# Patient Record
Sex: MALE | Race: WHITE | Employment: FULL TIME | URBAN - METROPOLITAN AREA
[De-identification: names, ages, dates, MRNs, and addresses within clinical notes are randomized per-mention and may not be internally consistent; named-entity substitution may affect disease eponyms.]

---

## 2017-04-11 ENCOUNTER — HOSPITAL ENCOUNTER (EMERGENCY)
Age: 22
Discharge: HOME | End: 2017-04-11

## 2017-04-11 VITALS
HEART RATE: 68 BPM | HEIGHT: 70 IN | DIASTOLIC BLOOD PRESSURE: 77 MMHG | TEMPERATURE: 98.1 F | BODY MASS INDEX: 25.77 KG/M2 | SYSTOLIC BLOOD PRESSURE: 129 MMHG | OXYGEN SATURATION: 97 % | WEIGHT: 180 LBS | RESPIRATION RATE: 18 BRPM

## 2017-04-11 DIAGNOSIS — S05.01XA CORNEA ABRASION, RIGHT, INITIAL ENCOUNTER: Primary | ICD-10-CM

## 2017-04-11 PROCEDURE — 99283 EMERGENCY DEPT VISIT LOW MDM: CPT

## 2017-04-11 PROCEDURE — 6370000000 HC RX 637 (ALT 250 FOR IP): Performed by: PHYSICIAN ASSISTANT

## 2017-04-11 RX ORDER — ERYTHROMYCIN 5 MG/G
OINTMENT OPHTHALMIC EVERY 6 HOURS
Qty: 1 TUBE | Refills: 0 | Status: SHIPPED | OUTPATIENT
Start: 2017-04-11

## 2017-04-11 RX ORDER — HYDROCODONE BITARTRATE AND ACETAMINOPHEN 5; 325 MG/1; MG/1
1 TABLET ORAL EVERY 6 HOURS PRN
Qty: 10 TABLET | Refills: 0 | Status: SHIPPED | OUTPATIENT
Start: 2017-04-11 | End: 2017-04-18

## 2017-04-11 RX ORDER — TETRACAINE HYDROCHLORIDE 5 MG/ML
1 SOLUTION OPHTHALMIC ONCE
Status: COMPLETED | OUTPATIENT
Start: 2017-04-11 | End: 2017-04-11

## 2017-04-11 RX ADMIN — TETRACAINE HYDROCHLORIDE 1 DROP: 25 LIQUID OPHTHALMIC at 15:05

## 2017-04-11 RX ADMIN — FLUORESCEIN SODIUM 1 MG: 1 STRIP OPHTHALMIC at 15:04

## 2017-04-11 ASSESSMENT — ENCOUNTER SYMPTOMS
PHOTOPHOBIA: 1
EYE REDNESS: 1
DIARRHEA: 0
SHORTNESS OF BREATH: 0
EYE PAIN: 1
VOMITING: 0
COUGH: 0
EYE DISCHARGE: 1
NAUSEA: 0
ABDOMINAL PAIN: 0

## 2017-04-11 ASSESSMENT — PAIN DESCRIPTION - ORIENTATION: ORIENTATION: RIGHT

## 2017-04-11 ASSESSMENT — PAIN SCALES - GENERAL: PAINLEVEL_OUTOF10: 7

## 2017-04-11 ASSESSMENT — PAIN DESCRIPTION - LOCATION: LOCATION: EYE

## 2018-03-13 ENCOUNTER — HOSPITAL ENCOUNTER (EMERGENCY)
Age: 23
Discharge: HOME | End: 2018-03-13
Payer: COMMERCIAL

## 2018-03-13 VITALS
SYSTOLIC BLOOD PRESSURE: 145 MMHG | TEMPERATURE: 98.3 F | HEIGHT: 69 IN | WEIGHT: 195 LBS | RESPIRATION RATE: 14 BRPM | HEART RATE: 88 BPM | OXYGEN SATURATION: 96 % | DIASTOLIC BLOOD PRESSURE: 80 MMHG | BODY MASS INDEX: 28.88 KG/M2

## 2018-03-13 DIAGNOSIS — S41.112A LACERATION OF LEFT UPPER EXTREMITY, INITIAL ENCOUNTER: Primary | ICD-10-CM

## 2018-03-13 PROCEDURE — 99282 EMERGENCY DEPT VISIT SF MDM: CPT

## 2018-03-13 PROCEDURE — 6370000000 HC RX 637 (ALT 250 FOR IP): Performed by: NURSE PRACTITIONER

## 2018-03-13 PROCEDURE — 90715 TDAP VACCINE 7 YRS/> IM: CPT | Performed by: NURSE PRACTITIONER

## 2018-03-13 PROCEDURE — 6360000002 HC RX W HCPCS: Performed by: NURSE PRACTITIONER

## 2018-03-13 PROCEDURE — 90471 IMMUNIZATION ADMIN: CPT | Performed by: NURSE PRACTITIONER

## 2018-03-13 RX ORDER — SULFAMETHOXAZOLE AND TRIMETHOPRIM 800; 160 MG/1; MG/1
1 TABLET ORAL 2 TIMES DAILY
Qty: 14 TABLET | Refills: 0 | Status: SHIPPED | OUTPATIENT
Start: 2018-03-13 | End: 2018-03-20

## 2018-03-13 RX ORDER — ALBUTEROL SULFATE 0.63 MG/3ML
1 SOLUTION RESPIRATORY (INHALATION) EVERY 6 HOURS PRN
COMMUNITY

## 2018-03-13 RX ORDER — WOUND DRESSING ADHESIVE - LIQUID
1 LIQUID MISCELLANEOUS ONCE
Status: DISCONTINUED | OUTPATIENT
Start: 2018-03-13 | End: 2018-03-13

## 2018-03-13 RX ORDER — ACETAMINOPHEN 325 MG/1
650 TABLET ORAL ONCE
Status: COMPLETED | OUTPATIENT
Start: 2018-03-13 | End: 2018-03-13

## 2018-03-13 RX ADMIN — ACETAMINOPHEN 650 MG: 325 TABLET, FILM COATED ORAL at 18:37

## 2018-03-13 RX ADMIN — TETANUS TOXOID, REDUCED DIPHTHERIA TOXOID AND ACELLULAR PERTUSSIS VACCINE, ADSORBED 0.5 ML: 5; 2.5; 8; 8; 2.5 SUSPENSION INTRAMUSCULAR at 18:25

## 2018-03-13 ASSESSMENT — PAIN SCALES - GENERAL
PAINLEVEL_OUTOF10: 7
PAINLEVEL_OUTOF10: 7

## 2018-03-13 ASSESSMENT — ENCOUNTER SYMPTOMS
SHORTNESS OF BREATH: 0
COUGH: 0
ABDOMINAL PAIN: 0
BACK PAIN: 0

## 2018-03-13 ASSESSMENT — PAIN DESCRIPTION - PAIN TYPE: TYPE: ACUTE PAIN

## 2018-03-13 ASSESSMENT — PAIN DESCRIPTION - DESCRIPTORS: DESCRIPTORS: ACHING

## 2018-03-13 ASSESSMENT — PAIN DESCRIPTION - LOCATION: LOCATION: ARM

## 2018-03-13 ASSESSMENT — PAIN DESCRIPTION - ORIENTATION: ORIENTATION: LEFT

## 2018-03-13 NOTE — ED PROVIDER NOTES
Consent obtained:  Verbal    Consent given by:  Patient    Risks discussed:  Infection    Alternatives discussed:  No treatment  Anesthesia (see MAR for exact dosages): Anesthesia method:  None  Laceration details:     Location:  Shoulder/arm    Shoulder/arm location:  L upper arm    Length (cm):  1    Depth (mm):  2  Repair type:     Repair type:  Simple  Exploration:     Contaminated: yes    Treatment:     Area cleansed with:  Hibiclens    Amount of cleaning:  Standard    Irrigation solution:  Sterile saline    Irrigation volume:  200    Irrigation method:  Pressure wash    Visualized foreign bodies/material removed: no    Skin repair:     Repair method:  Steri-Strips    Number of Steri-Strips:  3  Approximation:     Approximation:  Close  Post-procedure details:     Dressing:  Adhesive bandage    Patient tolerance of procedure: Tolerated well, no immediate complications          FINAL IMPRESSION      1.  Laceration of left upper extremity, initial encounter          ELY Araujo (electronically signed)  Attending Emergency Physician        Tayler Guadarrama CNP  03/13/18 3078

## 2018-04-21 ENCOUNTER — HOSPITAL ENCOUNTER (EMERGENCY)
Age: 23
Discharge: HOME | End: 2018-04-22
Attending: EMERGENCY MEDICINE

## 2018-04-21 DIAGNOSIS — S00.83XA CONTUSION OF FACE, INITIAL ENCOUNTER: Primary | ICD-10-CM

## 2018-04-21 DIAGNOSIS — J45.990 ASTHMA, EXERCISE INDUCED: ICD-10-CM

## 2018-04-21 DIAGNOSIS — S20.212A CONTUSION OF LEFT CHEST WALL, INITIAL ENCOUNTER: ICD-10-CM

## 2018-04-21 PROCEDURE — 6370000000 HC RX 637 (ALT 250 FOR IP): Performed by: EMERGENCY MEDICINE

## 2018-04-21 PROCEDURE — 94640 AIRWAY INHALATION TREATMENT: CPT

## 2018-04-21 PROCEDURE — 6360000002 HC RX W HCPCS: Performed by: EMERGENCY MEDICINE

## 2018-04-21 PROCEDURE — 99284 EMERGENCY DEPT VISIT MOD MDM: CPT

## 2018-04-21 RX ORDER — ALBUTEROL SULFATE 90 UG/1
2 AEROSOL, METERED RESPIRATORY (INHALATION) EVERY 6 HOURS PRN
Qty: 1 INHALER | Refills: 3 | Status: SHIPPED | OUTPATIENT
Start: 2018-04-21

## 2018-04-21 RX ORDER — ACETAMINOPHEN 500 MG
1000 TABLET ORAL ONCE
Status: COMPLETED | OUTPATIENT
Start: 2018-04-21 | End: 2018-04-21

## 2018-04-21 RX ORDER — METHYLPREDNISOLONE 4 MG/1
TABLET ORAL
Qty: 1 KIT | Refills: 0 | Status: SHIPPED | OUTPATIENT
Start: 2018-04-21 | End: 2019-04-23

## 2018-04-21 RX ORDER — LEVALBUTEROL 1.25 MG/.5ML
1.25 SOLUTION, CONCENTRATE RESPIRATORY (INHALATION)
Status: DISCONTINUED | OUTPATIENT
Start: 2018-04-21 | End: 2018-04-22 | Stop reason: HOSPADM

## 2018-04-21 RX ADMIN — LEVALBUTEROL 1.25 MG: 1.25 SOLUTION, CONCENTRATE RESPIRATORY (INHALATION) at 23:47

## 2018-04-21 RX ADMIN — ACETAMINOPHEN 1000 MG: 500 TABLET, FILM COATED ORAL at 23:45

## 2018-04-21 ASSESSMENT — PAIN DESCRIPTION - ORIENTATION: ORIENTATION: MID

## 2018-04-21 ASSESSMENT — PAIN DESCRIPTION - FREQUENCY: FREQUENCY: CONTINUOUS

## 2018-04-21 ASSESSMENT — PAIN DESCRIPTION - LOCATION: LOCATION: HEAD

## 2018-04-21 ASSESSMENT — PAIN DESCRIPTION - DESCRIPTORS: DESCRIPTORS: HEADACHE

## 2018-04-21 ASSESSMENT — PAIN DESCRIPTION - PAIN TYPE: TYPE: ACUTE PAIN

## 2018-04-21 ASSESSMENT — PAIN SCALES - GENERAL: PAINLEVEL_OUTOF10: 6

## 2018-04-22 VITALS
SYSTOLIC BLOOD PRESSURE: 144 MMHG | OXYGEN SATURATION: 94 % | RESPIRATION RATE: 18 BRPM | HEIGHT: 67 IN | BODY MASS INDEX: 29.82 KG/M2 | DIASTOLIC BLOOD PRESSURE: 84 MMHG | TEMPERATURE: 98.5 F | HEART RATE: 111 BPM | WEIGHT: 190 LBS

## 2019-04-23 ENCOUNTER — HOSPITAL ENCOUNTER (EMERGENCY)
Age: 24
Discharge: HOME | End: 2019-04-23

## 2019-04-23 VITALS
RESPIRATION RATE: 17 BRPM | SYSTOLIC BLOOD PRESSURE: 136 MMHG | HEART RATE: 89 BPM | WEIGHT: 220 LBS | DIASTOLIC BLOOD PRESSURE: 67 MMHG | BODY MASS INDEX: 34.53 KG/M2 | OXYGEN SATURATION: 97 % | HEIGHT: 67 IN | TEMPERATURE: 99.2 F

## 2019-04-23 DIAGNOSIS — E86.0 DEHYDRATION: ICD-10-CM

## 2019-04-23 DIAGNOSIS — J02.0 STREP PHARYNGITIS: Primary | ICD-10-CM

## 2019-04-23 DIAGNOSIS — R53.1 GENERALIZED WEAKNESS: ICD-10-CM

## 2019-04-23 LAB
ALBUMIN SERPL-MCNC: 4.4 G/DL (ref 3.5–4.6)
ALP BLD-CCNC: 62 U/L (ref 35–104)
ALT SERPL-CCNC: 31 U/L (ref 0–41)
ANION GAP SERPL CALCULATED.3IONS-SCNC: 16 MEQ/L (ref 9–15)
AST SERPL-CCNC: 33 U/L (ref 0–40)
BASOPHILS ABSOLUTE: 0.1 K/UL (ref 0–0.2)
BASOPHILS RELATIVE PERCENT: 0.6 %
BILIRUB SERPL-MCNC: 0.5 MG/DL (ref 0.2–0.7)
BILIRUBIN URINE: NEGATIVE
BLOOD, URINE: NEGATIVE
BUN BLDV-MCNC: 15 MG/DL (ref 6–20)
CALCIUM SERPL-MCNC: 9.6 MG/DL (ref 8.5–9.9)
CHLORIDE BLD-SCNC: 99 MEQ/L (ref 95–107)
CLARITY: CLEAR
CO2: 24 MEQ/L (ref 20–31)
COLOR: YELLOW
CREAT SERPL-MCNC: 0.91 MG/DL (ref 0.7–1.2)
EOSINOPHILS ABSOLUTE: 0.2 K/UL (ref 0–0.7)
EOSINOPHILS RELATIVE PERCENT: 1.1 %
GFR AFRICAN AMERICAN: >60
GFR NON-AFRICAN AMERICAN: >60
GLOBULIN: 2.8 G/DL (ref 2.3–3.5)
GLUCOSE BLD-MCNC: 95 MG/DL (ref 70–99)
GLUCOSE URINE: NEGATIVE MG/DL
HCT VFR BLD CALC: 43.2 % (ref 42–52)
HEMOGLOBIN: 15.5 G/DL (ref 14–18)
KETONES, URINE: NEGATIVE MG/DL
LACTIC ACID: 1.5 MMOL/L (ref 0.5–2.2)
LEUKOCYTE ESTERASE, URINE: NEGATIVE
LYMPHOCYTES ABSOLUTE: 2.3 K/UL (ref 1–4.8)
LYMPHOCYTES RELATIVE PERCENT: 11.8 %
MCH RBC QN AUTO: 30.2 PG (ref 27–31.3)
MCHC RBC AUTO-ENTMCNC: 35.9 % (ref 33–37)
MCV RBC AUTO: 84.2 FL (ref 80–100)
MONO TEST: NEGATIVE
MONOCYTES ABSOLUTE: 1 K/UL (ref 0.2–0.8)
MONOCYTES RELATIVE PERCENT: 5.2 %
NEUTROPHILS ABSOLUTE: 15.5 K/UL (ref 1.4–6.5)
NEUTROPHILS RELATIVE PERCENT: 81.3 %
NITRITE, URINE: NEGATIVE
PDW BLD-RTO: 12.7 % (ref 11.5–14.5)
PH UA: 6 (ref 5–9)
PLATELET # BLD: 169 K/UL (ref 130–400)
POTASSIUM SERPL-SCNC: 4.4 MEQ/L (ref 3.4–4.9)
PROTEIN UA: NEGATIVE MG/DL
RBC # BLD: 5.13 M/UL (ref 4.7–6.1)
S PYO AG THROAT QL: POSITIVE
SODIUM BLD-SCNC: 139 MEQ/L (ref 135–144)
SPECIFIC GRAVITY UA: 1.01 (ref 1–1.03)
TOTAL PROTEIN: 7.2 G/DL (ref 6.3–8)
URINE REFLEX TO CULTURE: NORMAL
UROBILINOGEN, URINE: 0.2 E.U./DL
WBC # BLD: 19.1 K/UL (ref 4.8–10.8)

## 2019-04-23 PROCEDURE — 96375 TX/PRO/DX INJ NEW DRUG ADDON: CPT

## 2019-04-23 PROCEDURE — 36415 COLL VENOUS BLD VENIPUNCTURE: CPT

## 2019-04-23 PROCEDURE — 83605 ASSAY OF LACTIC ACID: CPT

## 2019-04-23 PROCEDURE — 85025 COMPLETE CBC W/AUTO DIFF WBC: CPT

## 2019-04-23 PROCEDURE — 99284 EMERGENCY DEPT VISIT MOD MDM: CPT

## 2019-04-23 PROCEDURE — 80053 COMPREHEN METABOLIC PANEL: CPT

## 2019-04-23 PROCEDURE — 87040 BLOOD CULTURE FOR BACTERIA: CPT

## 2019-04-23 PROCEDURE — 86308 HETEROPHILE ANTIBODY SCREEN: CPT

## 2019-04-23 PROCEDURE — 87880 STREP A ASSAY W/OPTIC: CPT

## 2019-04-23 PROCEDURE — 81003 URINALYSIS AUTO W/O SCOPE: CPT

## 2019-04-23 PROCEDURE — 96361 HYDRATE IV INFUSION ADD-ON: CPT

## 2019-04-23 PROCEDURE — 6370000000 HC RX 637 (ALT 250 FOR IP): Performed by: PHYSICIAN ASSISTANT

## 2019-04-23 PROCEDURE — 2580000003 HC RX 258: Performed by: PHYSICIAN ASSISTANT

## 2019-04-23 PROCEDURE — 6360000002 HC RX W HCPCS: Performed by: PHYSICIAN ASSISTANT

## 2019-04-23 PROCEDURE — 96365 THER/PROPH/DIAG IV INF INIT: CPT

## 2019-04-23 RX ORDER — ONDANSETRON 2 MG/ML
4 INJECTION INTRAMUSCULAR; INTRAVENOUS ONCE
Status: COMPLETED | OUTPATIENT
Start: 2019-04-23 | End: 2019-04-23

## 2019-04-23 RX ORDER — ACETAMINOPHEN 325 MG/1
650 TABLET ORAL EVERY 6 HOURS PRN
Qty: 40 TABLET | Refills: 0 | Status: SHIPPED | OUTPATIENT
Start: 2019-04-23 | End: 2021-10-14

## 2019-04-23 RX ORDER — 0.9 % SODIUM CHLORIDE 0.9 %
2000 INTRAVENOUS SOLUTION INTRAVENOUS ONCE
Status: COMPLETED | OUTPATIENT
Start: 2019-04-23 | End: 2019-04-23

## 2019-04-23 RX ORDER — ACETAMINOPHEN 500 MG
1000 TABLET ORAL ONCE
Status: COMPLETED | OUTPATIENT
Start: 2019-04-23 | End: 2019-04-23

## 2019-04-23 RX ORDER — DEXAMETHASONE SODIUM PHOSPHATE 10 MG/ML
10 INJECTION INTRAMUSCULAR; INTRAVENOUS ONCE
Status: COMPLETED | OUTPATIENT
Start: 2019-04-23 | End: 2019-04-23

## 2019-04-23 RX ORDER — METHYLPREDNISOLONE 4 MG/1
TABLET ORAL
Qty: 21 TABLET | Refills: 0 | Status: SHIPPED | OUTPATIENT
Start: 2019-04-23 | End: 2019-04-29

## 2019-04-23 RX ORDER — AMOXICILLIN 500 MG/1
500 CAPSULE ORAL 2 TIMES DAILY
Qty: 20 CAPSULE | Refills: 0 | Status: SHIPPED | OUTPATIENT
Start: 2019-04-23 | End: 2019-05-03

## 2019-04-23 RX ADMIN — CEFTRIAXONE SODIUM 1 G: 1 INJECTION, POWDER, FOR SOLUTION INTRAMUSCULAR; INTRAVENOUS at 21:57

## 2019-04-23 RX ADMIN — ACETAMINOPHEN 1000 MG: 500 TABLET ORAL at 21:18

## 2019-04-23 RX ADMIN — SODIUM CHLORIDE 2000 ML: 9 INJECTION, SOLUTION INTRAVENOUS at 21:23

## 2019-04-23 RX ADMIN — ONDANSETRON 4 MG: 2 INJECTION INTRAMUSCULAR; INTRAVENOUS at 21:23

## 2019-04-23 RX ADMIN — DEXAMETHASONE SODIUM PHOSPHATE 10 MG: 10 INJECTION INTRAMUSCULAR; INTRAVENOUS at 21:28

## 2019-04-23 ASSESSMENT — PAIN DESCRIPTION - PAIN TYPE: TYPE: ACUTE PAIN

## 2019-04-23 ASSESSMENT — PAIN DESCRIPTION - LOCATION: LOCATION: FOOT

## 2019-04-23 ASSESSMENT — PAIN DESCRIPTION - ORIENTATION: ORIENTATION: RIGHT;LEFT

## 2019-04-23 ASSESSMENT — ENCOUNTER SYMPTOMS
EYE PAIN: 0
BACK PAIN: 1
APNEA: 0
CHEST TIGHTNESS: 0
SHORTNESS OF BREATH: 0
COLOR CHANGE: 0
SORE THROAT: 1
ALLERGIC/IMMUNOLOGIC NEGATIVE: 1
TROUBLE SWALLOWING: 0
CHOKING: 0
ABDOMINAL PAIN: 0

## 2019-04-23 ASSESSMENT — PAIN DESCRIPTION - FREQUENCY: FREQUENCY: CONTINUOUS

## 2019-04-23 ASSESSMENT — PAIN DESCRIPTION - DESCRIPTORS: DESCRIPTORS: ACHING

## 2019-04-23 ASSESSMENT — PAIN SCALES - GENERAL
PAINLEVEL_OUTOF10: 7
PAINLEVEL_OUTOF10: 7

## 2019-04-24 NOTE — ED NOTES
Pt medicated per orders, brandon. Well. Pt a&ox4, skin w/d/pink, pulses palp, msp's intact, will monitor.      Corby York RN  04/23/19 2125

## 2019-04-24 NOTE — ED NOTES
Patient is aware that a urine specimen must be collected.         Morales Mcintosh RN  04/23/19 8765

## 2019-04-24 NOTE — ED NOTES
Pt resting in bed, 0 c/o, 0 distress, a&ox4, 0 N&v, 0 pain, will monitor.      Lucy Sosa RN  04/23/19 6379

## 2019-04-24 NOTE — ED NOTES
Obt. Blood and rapid strep swab to lab, pt brandon. Well, lab at bedside to obt. Blood cultures.      Adrianna Hernandez RN  04/23/19 7512

## 2019-04-24 NOTE — ED PROVIDER NOTES
and myalgias. Negative for neck pain and neck stiffness. Skin: Negative for color change and rash. Allergic/Immunologic: Negative. Neurological: Positive for dizziness and weakness. Negative for numbness. Hematological: Negative. Psychiatric/Behavioral: Negative. All other systems reviewed and are negative. Except as noted above the remainder of the review of systems was reviewed and negative. PAST MEDICAL HISTORY     Past Medical History:   Diagnosis Date    Asthma          SURGICAL HISTORY     No past surgical history on file. CURRENT MEDICATIONS       Previous Medications    ALBUTEROL (ACCUNEB) 0.63 MG/3ML NEBULIZER SOLUTION    Take 1 ampule by nebulization every 6 hours as needed for Wheezing    ALBUTEROL SULFATE HFA (PROVENTIL HFA) 108 (90 BASE) MCG/ACT INHALER    Inhale 2 puffs into the lungs every 6 hours as needed for Wheezing    ERYTHROMYCIN (ROMYCIN) 5 MG/GM OPHTHALMIC OINTMENT    Place into the right eye every 6 hours       ALLERGIES     Aspirin and Ibuprofen    FAMILY HISTORY     No family history on file.        SOCIAL HISTORY       Social History     Socioeconomic History    Marital status: Single     Spouse name: Not on file    Number of children: Not on file    Years of education: Not on file    Highest education level: Not on file   Occupational History    Not on file   Social Needs    Financial resource strain: Not on file    Food insecurity:     Worry: Not on file     Inability: Not on file    Transportation needs:     Medical: Not on file     Non-medical: Not on file   Tobacco Use    Smoking status: Current Every Day Smoker     Packs/day: 1.00     Types: Cigarettes    Smokeless tobacco: Current User     Types: Chew   Substance and Sexual Activity    Alcohol use: Yes     Comment: \"beer now and than\"    Drug use: No    Sexual activity: Not on file   Lifestyle    Physical activity:     Days per week: Not on file     Minutes per session: Not on file    erythema. Psychiatric: He has a normal mood and affect. His behavior is normal. Judgment and thought content normal.   Nursing note and vitals reviewed. DIAGNOSTIC RESULTS     RADIOLOGY:   Non-plain film images such as CT, Ultrasound and MRI are read by the radiologist. Stone Ronald radiographic images are visualized and preliminarilyinterpreted by Sai Saavedra PA-C with the below findings:        Interpretation per the Radiologist below, if available at the time of this note:    No orders to display       LABS:  [address] - Abnormal; Notable for the following components:       Result Value    Rapid Strep A Screen POSITIVE (*)     All other components within normal limits   COMPREHENSIVE METABOLIC PANEL - Abnormal; Notable for the following components:    Anion Gap 16 (*)     All other components within normal limits   CBC WITH AUTO DIFFERENTIAL - Abnormal; Notable for the following components:    WBC 19.1 (*)     Neutrophils # 15.5 (*)     Monocytes # 1.0 (*)     All other components within normal limits   CULTURE BLOOD #1   CULTURE BLOOD #2   LACTIC ACID, PLASMA   MONONUCLEOSIS SCREEN   URINE RT REFLEX TO CULTURE       All other labs were within normal range or not returnedas of this dictation. EMERGENCYDEPARTMENT COURSE and DIFFERENTIAL DIAGNOSIS/MDM:   Vitals:    Vitals:    04/23/19 2045 04/23/19 2125 04/23/19 2130 04/23/19 2200   BP: 102/65 (!) 136/92 128/63 (!) 143/84   Pulse: 121 100 100 99   Resp: 20 17 16 18   Temp: 99.1 °F (37.3 °C)      TempSrc: Oral      SpO2: 95% 98% 97% 97%   Weight: 220 lb (99.8 kg)      Height: 5' 7\" (1.702 m)          REASSESSMENT        Patient is positive for Strep A. Improved symptoms after IV meds and fluids. DC with antibiotics and PCP follow up. MDM    PROCEDURES:    Procedures      FINAL IMPRESSION      1. Strep pharyngitis    2. Generalized weakness    3.  Dehydration          DISPOSITION/PLAN   DISPOSITION Discharge - Pending Orders

## 2019-04-24 NOTE — ED TRIAGE NOTES
Pt states that he had an infection in his upper groin x2 weeks ago. Pt was treated at [facility] and was advised once the area was gone he could stop taking the antibiotics. Pt states that he stopped taking those a week ago. Pt states that he woke up this morning with lower back pain, shaking, and cold. Pt states that he has been getting worse thought out the day and he is now not able to tolerate it.

## 2019-04-29 LAB
BLOOD CULTURE, ROUTINE: NORMAL
CULTURE, BLOOD 2: NORMAL

## 2019-05-22 ENCOUNTER — HOSPITAL ENCOUNTER (EMERGENCY)
Age: 24
Discharge: HOME | End: 2019-05-22

## 2019-05-22 VITALS
SYSTOLIC BLOOD PRESSURE: 124 MMHG | TEMPERATURE: 98.4 F | HEART RATE: 117 BPM | HEIGHT: 66 IN | WEIGHT: 220 LBS | RESPIRATION RATE: 20 BRPM | BODY MASS INDEX: 35.36 KG/M2 | DIASTOLIC BLOOD PRESSURE: 68 MMHG | OXYGEN SATURATION: 100 %

## 2019-05-22 DIAGNOSIS — L73.2 HIDRADENITIS SUPPURATIVA: Primary | ICD-10-CM

## 2019-05-22 PROCEDURE — 87070 CULTURE OTHR SPECIMN AEROBIC: CPT

## 2019-05-22 PROCEDURE — 87186 SC STD MICRODIL/AGAR DIL: CPT

## 2019-05-22 PROCEDURE — 6370000000 HC RX 637 (ALT 250 FOR IP): Performed by: PHYSICIAN ASSISTANT

## 2019-05-22 PROCEDURE — 99283 EMERGENCY DEPT VISIT LOW MDM: CPT

## 2019-05-22 PROCEDURE — 87075 CULTR BACTERIA EXCEPT BLOOD: CPT

## 2019-05-22 PROCEDURE — 87205 SMEAR GRAM STAIN: CPT

## 2019-05-22 PROCEDURE — 87147 CULTURE TYPE IMMUNOLOGIC: CPT

## 2019-05-22 PROCEDURE — 87077 CULTURE AEROBIC IDENTIFY: CPT

## 2019-05-22 RX ORDER — HYDROCODONE BITARTRATE AND ACETAMINOPHEN 5; 325 MG/1; MG/1
1 TABLET ORAL EVERY 6 HOURS PRN
Qty: 8 TABLET | Refills: 0 | Status: SHIPPED | OUTPATIENT
Start: 2019-05-22 | End: 2019-05-24

## 2019-05-22 RX ORDER — HYDROCODONE BITARTRATE AND ACETAMINOPHEN 5; 325 MG/1; MG/1
1 TABLET ORAL ONCE
Status: COMPLETED | OUTPATIENT
Start: 2019-05-22 | End: 2019-05-22

## 2019-05-22 RX ORDER — SULFAMETHOXAZOLE AND TRIMETHOPRIM 800; 160 MG/1; MG/1
1 TABLET ORAL 2 TIMES DAILY
Qty: 14 TABLET | Refills: 0 | Status: SHIPPED | OUTPATIENT
Start: 2019-05-22 | End: 2019-05-29

## 2019-05-22 RX ORDER — SULFAMETHOXAZOLE AND TRIMETHOPRIM 800; 160 MG/1; MG/1
1 TABLET ORAL ONCE
Status: COMPLETED | OUTPATIENT
Start: 2019-05-22 | End: 2019-05-22

## 2019-05-22 RX ADMIN — HYDROCODONE BITARTRATE AND ACETAMINOPHEN 1 TABLET: 5; 325 TABLET ORAL at 16:15

## 2019-05-22 RX ADMIN — SULFAMETHOXAZOLE AND TRIMETHOPRIM 1 TABLET: 800; 160 TABLET ORAL at 16:15

## 2019-05-22 ASSESSMENT — ENCOUNTER SYMPTOMS
SORE THROAT: 0
ABDOMINAL PAIN: 0
ABDOMINAL DISTENTION: 0
EYE DISCHARGE: 0
SHORTNESS OF BREATH: 0
RHINORRHEA: 0
ROS SKIN COMMENTS: ABSCESS LEFT AXILLA
CONSTIPATION: 0
COLOR CHANGE: 0

## 2019-05-22 ASSESSMENT — PAIN DESCRIPTION - ONSET: ONSET: ON-GOING

## 2019-05-22 ASSESSMENT — PAIN DESCRIPTION - PAIN TYPE: TYPE: ACUTE PAIN

## 2019-05-22 ASSESSMENT — PAIN SCALES - GENERAL: PAINLEVEL_OUTOF10: 10

## 2019-05-22 ASSESSMENT — PAIN DESCRIPTION - PROGRESSION: CLINICAL_PROGRESSION: GRADUALLY WORSENING

## 2019-05-22 ASSESSMENT — PAIN DESCRIPTION - FREQUENCY: FREQUENCY: INTERMITTENT

## 2019-05-22 ASSESSMENT — PAIN DESCRIPTION - DESCRIPTORS: DESCRIPTORS: ACHING

## 2019-05-22 NOTE — ED PROVIDER NOTES
[address] [facility] ED  eMERGENCY dEPARTMENT eNCOUnter      Pt Name: Srikanth Murphy  MRN: [id number]  [place] 1995  Date of evaluation: 5/22/2019  Provider: Meenu Lopez PA-C    CHIEF COMPLAINT       Chief Complaint   Patient presents with    Abscess     popped a pimped under left armpit. HISTORY OF PRESENT ILLNESS   (Location/Symptom, Timing/Onset,Context/Setting, Quality, Duration, Modifying Factors, Severity)  Note limiting factors. Srikanth Murphy is a 26 y.o. male who presents to the emergency department with complaint of abscess under her left axilla which patient has been present for last 48 hours. He states yesterday he tried to open and draining it on his own by soaking and needle alcohol and heating it with the flame. Has become more inflamed or swollen since yesterday. Eyes any fevers no nausea vomiting or dizziness. HPI    NursingNotes were reviewed. REVIEW OF SYSTEMS    (2-9 systems for level 4, 10 or more for level 5)     Review of Systems   Constitutional: Negative for activity change and appetite change. HENT: Negative for congestion, ear discharge, ear pain, nosebleeds, rhinorrhea and sore throat. Eyes: Negative for discharge. Respiratory: Negative for shortness of breath. Cardiovascular: Negative for chest pain, palpitations and leg swelling. Gastrointestinal: Negative for abdominal distention, abdominal pain and constipation. Genitourinary: Negative for difficulty urinating and dysuria. Musculoskeletal: Negative for arthralgias. Skin: Positive for wound. Negative for color change. Abscess left axilla   Neurological: Negative for dizziness, tremors, syncope, weakness, numbness and headaches. Psychiatric/Behavioral: Negative for agitation and confusion. Except as noted above the remainder of the review of systems was reviewed and negative.        PAST MEDICAL HISTORY     Past Medical History:   Diagnosis Date    Asthma file     Emotionally abused: Not on file     Physically abused: Not on file     Forced sexual activity: Not on file   Other Topics Concern    Not on file   Social History Narrative    Not on file       SCREENINGS      @[facility]([id number])@      PHYSICAL EXAM    (up to 7 for level 4, 8 or more for level 5)     ED Triage Vitals [05/22/19 1509]   BP Temp Temp Source Pulse Resp SpO2 Height Weight   124/68 98.4 °F (36.9 °C) Oral 117 20 100 % 5' 6\" (1.676 m) 220 lb (99.8 kg)       Physical Exam   Constitutional: He is oriented to person, place, and time. He appears well-developed and well-nourished. HENT:   Head: Normocephalic. Eyes: Pupils are equal, round, and reactive to light. Neck: Normal range of motion. Neck supple. No JVD present. No tracheal deviation present. Cardiovascular: Normal rate. Pulmonary/Chest: Effort normal. No respiratory distress. He has no wheezes. He has no rales. He exhibits no tenderness. Abdominal: Soft. Bowel sounds are normal. He exhibits no distension and no mass. There is no tenderness. There is no guarding. Musculoskeletal: He exhibits no edema or deformity. Neurological: He is oriented to person, place, and time. Coordination normal.   Skin:   Abscess approximately 6 mm in diameter fluctuant open small amount of purulent drainage, with surrounding areas of erythema approximately 6 cm in diameter.        DIAGNOSTIC RESULTS     EKG: All EKG's are interpreted by the Emergency Department Physician who either signs or Co-signsthis chart in the absence of a cardiologist.        RADIOLOGY:   Francella Kill such as CT, Ultrasound and MRI are read by the radiologist. Plain radiographic images are visualized and preliminarily interpreted by the emergency physician with the below findings:        Interpretation per the Radiologist below, if available at the time ofthis note:    No orders to display         ED BEDSIDE ULTRASOUND:   Performed by ED Physician - none    LABS:  Labs Reviewed   ANAEROBIC AND AEROBIC CULTURE       All other labs were within normal range or not returned as of this dictation. EMERGENCY DEPARTMENT COURSE and DIFFERENTIAL DIAGNOSIS/MDM:   Vitals:    Vitals:    05/22/19 1509   BP: 124/68   Pulse: 117   Resp: 20   Temp: 98.4 °F (36.9 °C)   TempSrc: Oral   SpO2: 100%   Weight: 220 lb (99.8 kg)   Height: 5' 6\" (1.676 m)            MDM  Number of Diagnoses or Management Options  Hidradenitis suppurativa:   Diagnosis management comments: Small area of abscess under the left axilla approximately 5-6 m diameter area was anesthetized with percent lidocaine and a small stab incision was made with #11 blade approximately 3 mL of purulent discharge was expressed this was cultured and sent for analysis. Patient was given a dose of Bactrim DS here in the ER as well as [facility] for pain control. He will be sent home with the same, he was given follow-up for family [facility] and dentistry as well as Dr. Ileana Patiño general surgery patient was advised if he has any worsening symptoms increasing area of swelling increasing pain fevers to return to the ER for reevaluation. CRITICAL CARE TIME   Total Critical Care time was 0 minutes, excluding separately reportableprocedures. There was a high probability of clinicallysignificant/life threatening deterioration in the patient's condition which required my urgent intervention.      CONSULTS:  None    PROCEDURES:  Unless otherwise noted below, none     Incision/Drainage  Date/Time: 5/22/2019 3:59 PM  Performed by: Jose Armando Mccauley PA-C  Authorized by: Jose Armando Mccauley PA-C     Consent:     Consent obtained:  Verbal    Consent given by:  Patient    Risks discussed:  Bleeding, incomplete drainage, pain and infection    Alternatives discussed:  No treatment  Location:     Type:  Abscess    Location:  Upper extremity    Upper extremity location:  Arm    Arm location:  L upper arm  Pre-procedure details:     Skin preparation: Betadine  Anesthesia (see MAR for exact dosages): Anesthesia method:  Local infiltration    Local anesthetic:  Lidocaine 2% WITH epi  Procedure type:     Complexity:  Simple  Procedure details:     Needle aspiration: no      Incision types:  Stab incision    Incision depth:  Submucosal    Scalpel blade:  11    Wound management:  Probed and deloculated and irrigated with saline    Drainage:  Purulent    Drainage amount:  Scant    Wound treatment:  Wound left open    Packing materials:  None  Post-procedure details:     Patient tolerance of procedure: Tolerated well, no immediate complications        FINAL IMPRESSION      1. Hidradenitis suppurativa          DISPOSITION/PLAN   DISPOSITION Decision To Discharge 05/22/2019 04:00:08 PM      PATIENT REFERRED TO:  [facility]  [address] [facility]  [phone]  In 3 days      Barry Solorio MD  [address] [place] [address]  [phone]            DISCHARGE MEDICATIONS:  New Prescriptions    HYDROCODONE-ACETAMINOPHEN (NORCO) 5-325 MG PER TABLET    Take 1 tablet by mouth every 6 hours as needed for Pain for up to 2 days.     SULFAMETHOXAZOLE-TRIMETHOPRIM (BACTRIM DS) 800-160 MG PER TABLET    Take 1 tablet by mouth 2 times daily for 7 days          (Please note that portions of this note were completed with a voice recognition program.  Efforts were made to edit the dictations but occasionally words are mis-transcribed.)    Omi Flanagan PA-C (electronically signed)  Attending Emergency Physician         Omi Flanagan PA-C  05/22/19 1301

## 2019-05-22 NOTE — ED NOTES
Wound cleansed with NS, sterile dressing applied to site, patient tolerated well     James Palacio RN  05/22/19 4805

## 2019-05-22 NOTE — ED NOTES
Abscess noted to left axillary, purulent drainage noted, redness and edema noted surrounding site     Kp Mclaughlin, RN  05/22/19 2035

## 2019-05-24 LAB
ANAEROBIC CULTURE: ABNORMAL
GRAM STAIN RESULT: ABNORMAL
ORGANISM: ABNORMAL
WOUND/ABSCESS: ABNORMAL

## 2019-06-11 ENCOUNTER — TELEPHONE (OUTPATIENT)
Dept: SURGERY | Age: 24
End: 2019-06-11

## 2019-06-11 NOTE — TELEPHONE ENCOUNTER
Tried to call this patient several times to schedule him an appointment, today he hung up on me.    ----- Message from Bee Shelley MD sent at 5/22/2019  9:07 PM EDT -----    Needs an appt for hidradenitis thanks, Mukul Garcias

## 2019-12-06 ENCOUNTER — APPOINTMENT (OUTPATIENT)
Dept: CT IMAGING | Age: 24
End: 2019-12-06

## 2019-12-06 ENCOUNTER — HOSPITAL ENCOUNTER (EMERGENCY)
Age: 24
Discharge: HOME | End: 2019-12-07

## 2019-12-06 VITALS
SYSTOLIC BLOOD PRESSURE: 128 MMHG | DIASTOLIC BLOOD PRESSURE: 76 MMHG | RESPIRATION RATE: 18 BRPM | TEMPERATURE: 99.1 F | BODY MASS INDEX: 31.82 KG/M2 | WEIGHT: 198 LBS | HEART RATE: 90 BPM | OXYGEN SATURATION: 100 % | HEIGHT: 66 IN

## 2019-12-06 DIAGNOSIS — K52.9 COLITIS: Primary | ICD-10-CM

## 2019-12-06 LAB
ABO/RH: NORMAL
ALBUMIN SERPL-MCNC: 3.5 G/DL (ref 3.5–4.6)
ALP BLD-CCNC: 56 U/L (ref 35–104)
ALT SERPL-CCNC: 27 U/L (ref 0–41)
ANION GAP SERPL CALCULATED.3IONS-SCNC: 9 MEQ/L (ref 9–15)
ANTIBODY SCREEN: NORMAL
AST SERPL-CCNC: 29 U/L (ref 0–40)
ATYPICAL LYMPHOCYTE RELATIVE PERCENT: 13 %
BANDED NEUTROPHILS RELATIVE PERCENT: 1 %
BASOPHILS ABSOLUTE: 0 K/UL (ref 0–0.2)
BASOPHILS RELATIVE PERCENT: 0.7 %
BILIRUB SERPL-MCNC: 0.5 MG/DL (ref 0.2–0.7)
BUN BLDV-MCNC: 10 MG/DL (ref 6–20)
CALCIUM SERPL-MCNC: 8.7 MG/DL (ref 8.5–9.9)
CHLORIDE BLD-SCNC: 102 MEQ/L (ref 95–107)
CO2: 27 MEQ/L (ref 20–31)
CREAT SERPL-MCNC: 1.07 MG/DL (ref 0.7–1.2)
EOSINOPHILS ABSOLUTE: 0.4 K/UL (ref 0–0.7)
EOSINOPHILS RELATIVE PERCENT: 5 %
GFR AFRICAN AMERICAN: >60
GFR NON-AFRICAN AMERICAN: >60
GLOBULIN: 2.4 G/DL (ref 2.3–3.5)
GLUCOSE BLD-MCNC: 99 MG/DL (ref 70–99)
HCT VFR BLD CALC: 43.6 % (ref 42–52)
HEMOGLOBIN: 15.2 G/DL (ref 14–18)
LACTIC ACID: 1.3 MMOL/L (ref 0.5–2.2)
LIPASE: 24 U/L (ref 12–95)
LYMPHOCYTES ABSOLUTE: 4.6 K/UL (ref 1–4.8)
LYMPHOCYTES RELATIVE PERCENT: 45 %
MCH RBC QN AUTO: 29.8 PG (ref 27–31.3)
MCHC RBC AUTO-ENTMCNC: 34.8 % (ref 33–37)
MCV RBC AUTO: 85.4 FL (ref 80–100)
MONOCYTES ABSOLUTE: 0.3 K/UL (ref 0.2–0.8)
MONOCYTES RELATIVE PERCENT: 3.9 %
NEUTROPHILS ABSOLUTE: 2.7 K/UL (ref 1.4–6.5)
NEUTROPHILS RELATIVE PERCENT: 33 %
PDW BLD-RTO: 12.8 % (ref 11.5–14.5)
PLATELET # BLD: 136 K/UL (ref 130–400)
PLATELET SLIDE REVIEW: NORMAL
POTASSIUM SERPL-SCNC: 4.1 MEQ/L (ref 3.4–4.9)
RBC # BLD: 5.11 M/UL (ref 4.7–6.1)
SMUDGE CELLS: 2.9
SODIUM BLD-SCNC: 138 MEQ/L (ref 135–144)
TOTAL PROTEIN: 5.9 G/DL (ref 6.3–8)
VACUOLATED NEUTROPHILS: PRESENT
WBC # BLD: 8 K/UL (ref 4.8–10.8)

## 2019-12-06 PROCEDURE — 99284 EMERGENCY DEPT VISIT MOD MDM: CPT

## 2019-12-06 PROCEDURE — 80053 COMPREHEN METABOLIC PANEL: CPT

## 2019-12-06 PROCEDURE — 36415 COLL VENOUS BLD VENIPUNCTURE: CPT

## 2019-12-06 PROCEDURE — 83690 ASSAY OF LIPASE: CPT

## 2019-12-06 PROCEDURE — 74177 CT ABD & PELVIS W/CONTRAST: CPT

## 2019-12-06 PROCEDURE — 6360000002 HC RX W HCPCS: Performed by: PHYSICIAN ASSISTANT

## 2019-12-06 PROCEDURE — 96374 THER/PROPH/DIAG INJ IV PUSH: CPT

## 2019-12-06 PROCEDURE — 2580000003 HC RX 258: Performed by: PHYSICIAN ASSISTANT

## 2019-12-06 PROCEDURE — 86901 BLOOD TYPING SEROLOGIC RH(D): CPT

## 2019-12-06 PROCEDURE — 96372 THER/PROPH/DIAG INJ SC/IM: CPT

## 2019-12-06 PROCEDURE — 96375 TX/PRO/DX INJ NEW DRUG ADDON: CPT

## 2019-12-06 PROCEDURE — 83605 ASSAY OF LACTIC ACID: CPT

## 2019-12-06 PROCEDURE — 6360000004 HC RX CONTRAST MEDICATION: Performed by: PHYSICIAN ASSISTANT

## 2019-12-06 PROCEDURE — 86850 RBC ANTIBODY SCREEN: CPT

## 2019-12-06 PROCEDURE — C9113 INJ PANTOPRAZOLE SODIUM, VIA: HCPCS | Performed by: PHYSICIAN ASSISTANT

## 2019-12-06 PROCEDURE — 86900 BLOOD TYPING SEROLOGIC ABO: CPT

## 2019-12-06 PROCEDURE — 85025 COMPLETE CBC W/AUTO DIFF WBC: CPT

## 2019-12-06 RX ORDER — PANTOPRAZOLE SODIUM 40 MG/10ML
40 INJECTION, POWDER, LYOPHILIZED, FOR SOLUTION INTRAVENOUS ONCE
Status: COMPLETED | OUTPATIENT
Start: 2019-12-06 | End: 2019-12-06

## 2019-12-06 RX ORDER — DICYCLOMINE HYDROCHLORIDE 10 MG/1
10 CAPSULE ORAL EVERY 6 HOURS PRN
Qty: 20 CAPSULE | Refills: 0 | Status: SHIPPED | OUTPATIENT
Start: 2019-12-06 | End: 2021-10-14

## 2019-12-06 RX ORDER — ONDANSETRON 2 MG/ML
4 INJECTION INTRAMUSCULAR; INTRAVENOUS ONCE
Status: COMPLETED | OUTPATIENT
Start: 2019-12-06 | End: 2019-12-06

## 2019-12-06 RX ORDER — METRONIDAZOLE 500 MG/1
500 TABLET ORAL 3 TIMES DAILY
Qty: 30 TABLET | Refills: 0 | Status: SHIPPED | OUTPATIENT
Start: 2019-12-06 | End: 2019-12-16

## 2019-12-06 RX ORDER — METRONIDAZOLE 500 MG/1
500 TABLET ORAL ONCE
Status: COMPLETED | OUTPATIENT
Start: 2019-12-06 | End: 2019-12-07

## 2019-12-06 RX ORDER — CIPROFLOXACIN 500 MG/1
500 TABLET, FILM COATED ORAL ONCE
Status: COMPLETED | OUTPATIENT
Start: 2019-12-06 | End: 2019-12-07

## 2019-12-06 RX ORDER — 0.9 % SODIUM CHLORIDE 0.9 %
1000 INTRAVENOUS SOLUTION INTRAVENOUS ONCE
Status: COMPLETED | OUTPATIENT
Start: 2019-12-06 | End: 2019-12-07

## 2019-12-06 RX ORDER — DICYCLOMINE HYDROCHLORIDE 10 MG/ML
20 INJECTION INTRAMUSCULAR ONCE
Status: COMPLETED | OUTPATIENT
Start: 2019-12-06 | End: 2019-12-06

## 2019-12-06 RX ORDER — ONDANSETRON 4 MG/1
4 TABLET, FILM COATED ORAL EVERY 8 HOURS PRN
COMMUNITY
End: 2021-10-14

## 2019-12-06 RX ORDER — CIPROFLOXACIN 500 MG/1
500 TABLET, FILM COATED ORAL 2 TIMES DAILY
Qty: 20 TABLET | Refills: 0 | Status: SHIPPED | OUTPATIENT
Start: 2019-12-06 | End: 2019-12-16

## 2019-12-06 RX ADMIN — PANTOPRAZOLE SODIUM 40 MG: 40 INJECTION, POWDER, FOR SOLUTION INTRAVENOUS at 21:25

## 2019-12-06 RX ADMIN — ONDANSETRON 4 MG: 2 INJECTION INTRAMUSCULAR; INTRAVENOUS at 21:26

## 2019-12-06 RX ADMIN — DICYCLOMINE HYDROCHLORIDE 20 MG: 20 INJECTION INTRAMUSCULAR at 21:26

## 2019-12-06 RX ADMIN — SODIUM CHLORIDE 1000 ML: 9 INJECTION, SOLUTION INTRAVENOUS at 21:25

## 2019-12-06 RX ADMIN — IOPAMIDOL 100 ML: 755 INJECTION, SOLUTION INTRAVENOUS at 22:39

## 2019-12-06 ASSESSMENT — ENCOUNTER SYMPTOMS
APNEA: 0
VOMITING: 0
ABDOMINAL PAIN: 1
EYE PAIN: 0
NAUSEA: 1
BLOOD IN STOOL: 1
ALLERGIC/IMMUNOLOGIC NEGATIVE: 1
SHORTNESS OF BREATH: 0
TROUBLE SWALLOWING: 0
COLOR CHANGE: 0

## 2019-12-06 ASSESSMENT — PAIN DESCRIPTION - PAIN TYPE: TYPE: ACUTE PAIN

## 2019-12-06 ASSESSMENT — PAIN DESCRIPTION - LOCATION: LOCATION: ABDOMEN

## 2019-12-06 ASSESSMENT — PAIN SCALES - GENERAL: PAINLEVEL_OUTOF10: 9

## 2019-12-07 PROCEDURE — 6370000000 HC RX 637 (ALT 250 FOR IP): Performed by: PHYSICIAN ASSISTANT

## 2019-12-07 RX ADMIN — CIPROFLOXACIN 500 MG: 500 TABLET, FILM COATED ORAL at 00:00

## 2019-12-07 RX ADMIN — METRONIDAZOLE 500 MG: 500 TABLET ORAL at 00:00

## 2020-01-11 ENCOUNTER — HOSPITAL ENCOUNTER (EMERGENCY)
Age: 25
Discharge: HOME | End: 2020-01-11
Attending: EMERGENCY MEDICINE

## 2020-01-11 VITALS
HEART RATE: 86 BPM | HEIGHT: 68 IN | BODY MASS INDEX: 30.31 KG/M2 | RESPIRATION RATE: 19 BRPM | TEMPERATURE: 98 F | DIASTOLIC BLOOD PRESSURE: 64 MMHG | WEIGHT: 200 LBS | SYSTOLIC BLOOD PRESSURE: 124 MMHG

## 2020-01-11 PROCEDURE — 99284 EMERGENCY DEPT VISIT MOD MDM: CPT

## 2020-01-11 PROCEDURE — 94640 AIRWAY INHALATION TREATMENT: CPT

## 2020-01-11 PROCEDURE — 94761 N-INVAS EAR/PLS OXIMETRY MLT: CPT

## 2020-01-11 PROCEDURE — 6370000000 HC RX 637 (ALT 250 FOR IP): Performed by: EMERGENCY MEDICINE

## 2020-01-11 RX ORDER — AMOXICILLIN 250 MG/1
500 CAPSULE ORAL ONCE
Status: DISCONTINUED | OUTPATIENT
Start: 2020-01-11 | End: 2020-01-11 | Stop reason: HOSPADM

## 2020-01-11 RX ORDER — METHYLPREDNISOLONE 4 MG/1
TABLET ORAL
Qty: 1 KIT | Refills: 0 | Status: SHIPPED | OUTPATIENT
Start: 2020-01-11 | End: 2020-01-17

## 2020-01-11 RX ORDER — AMOXICILLIN 500 MG/1
500 CAPSULE ORAL 3 TIMES DAILY
Qty: 30 CAPSULE | Refills: 0 | Status: SHIPPED | OUTPATIENT
Start: 2020-01-11 | End: 2020-01-11

## 2020-01-11 RX ORDER — IPRATROPIUM BROMIDE AND ALBUTEROL SULFATE 2.5; .5 MG/3ML; MG/3ML
1 SOLUTION RESPIRATORY (INHALATION) ONCE
Status: COMPLETED | OUTPATIENT
Start: 2020-01-11 | End: 2020-01-11

## 2020-01-11 RX ORDER — CETIRIZINE HYDROCHLORIDE 10 MG/1
10 TABLET ORAL ONCE
Status: COMPLETED | OUTPATIENT
Start: 2020-01-11 | End: 2020-01-11

## 2020-01-11 RX ORDER — LORATADINE 10 MG/1
10 CAPSULE, LIQUID FILLED ORAL ONCE
Status: DISCONTINUED | OUTPATIENT
Start: 2020-01-11 | End: 2020-01-11 | Stop reason: CLARIF

## 2020-01-11 RX ORDER — CLINDAMYCIN HYDROCHLORIDE 300 MG/1
300 CAPSULE ORAL 2 TIMES DAILY
Qty: 14 CAPSULE | Refills: 0 | Status: SHIPPED | OUTPATIENT
Start: 2020-01-11 | End: 2020-01-18

## 2020-01-11 RX ORDER — PREDNISONE 20 MG/1
60 TABLET ORAL ONCE
Status: COMPLETED | OUTPATIENT
Start: 2020-01-11 | End: 2020-01-11

## 2020-01-11 RX ORDER — ALBUTEROL SULFATE 90 UG/1
1-2 AEROSOL, METERED RESPIRATORY (INHALATION) EVERY 4 HOURS PRN
Qty: 1 INHALER | Refills: 0 | Status: SHIPPED | OUTPATIENT
Start: 2020-01-11

## 2020-01-11 RX ADMIN — PREDNISONE 60 MG: 20 TABLET ORAL at 12:20

## 2020-01-11 RX ADMIN — CETIRIZINE HYDROCHLORIDE 10 MG: 10 TABLET, FILM COATED ORAL at 12:20

## 2020-01-11 RX ADMIN — IPRATROPIUM BROMIDE AND ALBUTEROL SULFATE 1 AMPULE: .5; 3 SOLUTION RESPIRATORY (INHALATION) at 11:59

## 2020-01-11 ASSESSMENT — ENCOUNTER SYMPTOMS
EYE PAIN: 0
COUGH: 1
STRIDOR: 0
WHEEZING: 1
NAUSEA: 0
SHORTNESS OF BREATH: 1
PHOTOPHOBIA: 0
VOMITING: 0
ABDOMINAL PAIN: 0
EYE DISCHARGE: 0
RHINORRHEA: 1
DIARRHEA: 0
BLOOD IN STOOL: 0
CONSTIPATION: 0
SORE THROAT: 1
BACK PAIN: 0
EYE REDNESS: 0

## 2020-01-11 ASSESSMENT — PAIN DESCRIPTION - LOCATION: LOCATION: CHEST

## 2020-01-11 ASSESSMENT — PAIN SCALES - GENERAL: PAINLEVEL_OUTOF10: 6

## 2020-01-11 ASSESSMENT — PAIN DESCRIPTION - PAIN TYPE: TYPE: ACUTE PAIN

## 2020-01-11 NOTE — ED PROVIDER NOTES
in stool, constipation, diarrhea, nausea and vomiting. Endocrine: Negative for polydipsia. Genitourinary: Negative for dysuria, flank pain, frequency, hematuria and urgency. Musculoskeletal: Negative for back pain, myalgias and neck pain. Skin: Negative for rash. Allergic/Immunologic: Negative for environmental allergies. Neurological: Negative for dizziness, tremors, seizures, weakness and headaches. Hematological: Does not bruise/bleed easily. Psychiatric/Behavioral: Negative for hallucinations and suicidal ideas. The patient is not nervous/anxious. Except as noted above the remainder of the review of systems was reviewed and negative. PAST MEDICAL HISTORY     Past Medical History:   Diagnosis Date    Asthma          SURGICAL HISTORY     History reviewed. No pertinent surgical history. CURRENT MEDICATIONS       Previous Medications    ACETAMINOPHEN (TYLENOL) 325 MG TABLET    Take 2 tablets by mouth every 6 hours as needed for Pain or Fever    ALBUTEROL (ACCUNEB) 0.63 MG/3ML NEBULIZER SOLUTION    Take 1 ampule by nebulization every 6 hours as needed for Wheezing    ALBUTEROL SULFATE HFA (PROVENTIL HFA) 108 (90 BASE) MCG/ACT INHALER    Inhale 2 puffs into the lungs every 6 hours as needed for Wheezing    DICYCLOMINE (BENTYL) 10 MG CAPSULE    Take 1 capsule by mouth every 6 hours as needed (cramps)    ERYTHROMYCIN (ROMYCIN) 5 MG/GM OPHTHALMIC OINTMENT    Place into the right eye every 6 hours    ONDANSETRON (ZOFRAN) 4 MG TABLET    Take 4 mg by mouth every 8 hours as needed for Nausea or Vomiting       ALLERGIES     Aspirin and Ibuprofen    FAMILY HISTORY     History reviewed. No pertinent family history.        SOCIAL HISTORY       Social History     Socioeconomic History    Marital status: Single     Spouse name: None    Number of children: None    Years of education: None    Highest education level: None   Occupational History    None   Social Needs    Financial resource and reactive to light. Neck:      Musculoskeletal: Normal range of motion and neck supple. Thyroid: No thyromegaly. Vascular: No JVD. Trachea: No tracheal deviation. Cardiovascular:      Rate and Rhythm: Normal rate and regular rhythm. Heart sounds: Normal heart sounds. No murmur. No friction rub. No gallop. Pulmonary:      Effort: Pulmonary effort is normal. No respiratory distress. Breath sounds: Normal breath sounds. No stridor. No wheezing or rales. Chest:      Chest wall: No tenderness. Abdominal:      General: Bowel sounds are normal. There is no distension. Palpations: Abdomen is soft. There is no mass. Tenderness: There is no tenderness. There is no guarding or rebound. Musculoskeletal: Normal range of motion. General: No tenderness. Lymphadenopathy:      Cervical: No cervical adenopathy. Skin:     General: Skin is warm and dry. Coloration: Skin is not pale. Findings: No erythema or rash. Neurological:      Mental Status: He is alert and oriented to person, place, and time. Cranial Nerves: No cranial nerve deficit. Motor: No abnormal muscle tone. Coordination: Coordination normal.      Deep Tendon Reflexes: Reflexes are normal and symmetric. Reflexes normal.   Psychiatric:         Behavior: Behavior normal.         Thought Content:  Thought content normal.         Judgment: Judgment normal.           DIAGNOSTIC RESULTS     EKG: All EKG's are interpreted by the Emergency Department Physician who either signs or Co-signs this chart in the absence of a cardiologist.    No EKG was indicated or ordered    RADIOLOGY:   Non-plain film images such as CT, Ultrasound and MRI are read by the radiologist. Plain radiographic images are visualized and preliminarily interpreted by the emergency physician with the below findings:    No diagnostic imaging was indicated or ordered    Interpretation per the Radiologist below, if available at

## 2020-03-04 ENCOUNTER — HOSPITAL ENCOUNTER (EMERGENCY)
Age: 25
Discharge: HOME | End: 2020-03-04
Attending: NEUROMUSCULOSKELETAL MEDICINE, SPORTS MEDICINE

## 2020-03-04 VITALS
HEART RATE: 76 BPM | DIASTOLIC BLOOD PRESSURE: 79 MMHG | SYSTOLIC BLOOD PRESSURE: 120 MMHG | BODY MASS INDEX: 31.17 KG/M2 | TEMPERATURE: 98.1 F | OXYGEN SATURATION: 96 % | RESPIRATION RATE: 18 BRPM | WEIGHT: 205 LBS

## 2020-03-04 PROCEDURE — 99282 EMERGENCY DEPT VISIT SF MDM: CPT

## 2020-03-04 ASSESSMENT — ENCOUNTER SYMPTOMS
SHORTNESS OF BREATH: 0
SINUS PAIN: 0
WHEEZING: 0
EYE DISCHARGE: 0
NAUSEA: 0
ABDOMINAL PAIN: 0
COUGH: 0
DIARRHEA: 0
SORE THROAT: 0
EYE PAIN: 0
EYE REDNESS: 0
VOMITING: 0

## 2020-03-04 NOTE — ED PROVIDER NOTES
[address] [facility] ED  eMERGENCYdEPARTMENT eNCOUnter      Pt Name: Caro Servin  MRN: [id number]  [place] 1995  Date of evaluation: 3/4/2020  Provider:SIMEON ZHANG MD    CHIEF COMPLAINT           HPI  Caro Servin is a 27 y.o. male per chart review has a h/o donating plasma today and noted that the blood seemed to come out fine initially and then towards the end after about an hour it seemed to stop noted clotted blood that came out and he got concerned, and decided to get checked into the emergency room. ROS  Review of Systems   Constitutional: Negative for appetite change, chills, diaphoresis, fatigue and fever. HENT: Negative for congestion, ear pain, sinus pain and sore throat. Eyes: Negative for pain, discharge and redness. Respiratory: Negative for cough, shortness of breath and wheezing. Cardiovascular: Negative for chest pain and palpitations. Gastrointestinal: Negative for abdominal pain, diarrhea, nausea and vomiting. Genitourinary: Negative for dysuria, flank pain and hematuria. Musculoskeletal: Negative for arthralgias and myalgias. Skin: Negative for rash and wound. Neurological: Negative for dizziness, syncope, light-headedness and headaches. All other systems reviewed and are negative. Except as noted above the remainder of the review of systems was reviewed and negative. PAST MEDICAL HISTORY     Past Medical History:   Diagnosis Date    Asthma          SURGICAL HISTORY     History reviewed. No pertinent surgical history.       CURRENTMEDICATIONS       Previous Medications    ACETAMINOPHEN (TYLENOL) 325 MG TABLET    Take 2 tablets by mouth every 6 hours as needed for Pain or Fever    ALBUTEROL (ACCUNEB) 0.63 MG/3ML NEBULIZER SOLUTION    Take 1 ampule by nebulization every 6 hours as needed for Wheezing    ALBUTEROL (PROVENTIL) (5 MG/ML) 0.5% NEBULIZER SOLUTION    Take 0.5 mLs by nebulization every 6 hours as needed for Wheezing    ALBUTEROL SULFATE HFA clubs or organizations: None     Relationship status: None    Intimate partner violence:     Fear of current or ex partner: None     Emotionally abused: None     Physically abused: None     Forced sexual activity: None   Other Topics Concern    None   Social History Narrative    None         PHYSICAL EXAM       ED Triage Vitals [03/04/20 1347]   BP Temp Temp Source Pulse Resp SpO2 Height Weight   120/79 98.1 °F (36.7 °C) Oral 76 18 96 % -- 205 lb (93 kg)       Physical Exam  Vitals signs and nursing note reviewed. Constitutional:       General: He is not in acute distress. Appearance: He is normal weight. He is not ill-appearing, toxic-appearing or diaphoretic. HENT:      Head: Normocephalic and atraumatic. Mouth/Throat:      Mouth: Mucous membranes are moist.   Eyes:      Extraocular Movements: Extraocular movements intact. Conjunctiva/sclera: Conjunctivae normal.      Pupils: Pupils are equal, round, and reactive to light. Neck:      Musculoskeletal: Neck supple. Cardiovascular:      Rate and Rhythm: Normal rate and regular rhythm. Pulses: Normal pulses. Heart sounds: Normal heart sounds. Pulmonary:      Effort: Pulmonary effort is normal. No respiratory distress. Breath sounds: Normal breath sounds. Abdominal:      General: Abdomen is flat. Bowel sounds are normal.      Palpations: Abdomen is soft. Skin:     General: Skin is warm and dry. Neurological:      General: No focal deficit present. Mental Status: He is alert. MDM  Patient reassured. FINAL IMPRESSION      1.  Puncture wound          DISPOSITION/PLAN   DISPOSITION Decision To Discharge 03/04/2020 02:10:09 PM      DISCHARGE MEDICATIONS:  Hosea Drake MD(electronically signed)  Attending Emergency Physician            Lauren Carlson MD  03/04/20 4556

## 2020-03-04 NOTE — ED TRIAGE NOTES
Pt a/o x 3 skin pink w/d resp non labored. pt states gave platelets today and durning process noticed blood clots in tubing and container which has never happened before. Pt gave at 1030 today. Pt did not notice swelling until later. O/e rt hand swollen with rt forearm. pt states his arm was starting to turn purple when trying to give back rbc.

## 2020-03-17 ENCOUNTER — HOSPITAL ENCOUNTER (EMERGENCY)
Age: 25
Discharge: HOME | End: 2020-03-17
Attending: STUDENT IN AN ORGANIZED HEALTH CARE EDUCATION/TRAINING PROGRAM

## 2020-03-17 ENCOUNTER — APPOINTMENT (OUTPATIENT)
Dept: GENERAL RADIOLOGY | Age: 25
End: 2020-03-17

## 2020-03-17 VITALS
WEIGHT: 215 LBS | DIASTOLIC BLOOD PRESSURE: 87 MMHG | HEART RATE: 82 BPM | BODY MASS INDEX: 32.58 KG/M2 | RESPIRATION RATE: 18 BRPM | SYSTOLIC BLOOD PRESSURE: 128 MMHG | TEMPERATURE: 98.1 F | HEIGHT: 68 IN | OXYGEN SATURATION: 98 %

## 2020-03-17 LAB
INFLUENZA A BY PCR: NEGATIVE
INFLUENZA B BY PCR: NEGATIVE
STREP GRP A PCR: NEGATIVE

## 2020-03-17 PROCEDURE — 6370000000 HC RX 637 (ALT 250 FOR IP): Performed by: STUDENT IN AN ORGANIZED HEALTH CARE EDUCATION/TRAINING PROGRAM

## 2020-03-17 PROCEDURE — 71046 X-RAY EXAM CHEST 2 VIEWS: CPT

## 2020-03-17 PROCEDURE — 87502 INFLUENZA DNA AMP PROBE: CPT

## 2020-03-17 PROCEDURE — 99283 EMERGENCY DEPT VISIT LOW MDM: CPT

## 2020-03-17 PROCEDURE — 87651 STREP A DNA AMP PROBE: CPT

## 2020-03-17 RX ORDER — LIDOCAINE HYDROCHLORIDE 20 MG/ML
5 SOLUTION OROPHARYNGEAL ONCE
Status: COMPLETED | OUTPATIENT
Start: 2020-03-17 | End: 2020-03-17

## 2020-03-17 RX ORDER — LIDOCAINE HYDROCHLORIDE 20 MG/ML
5 SOLUTION OROPHARYNGEAL
Qty: 100 ML | Refills: 0 | Status: SHIPPED | OUTPATIENT
Start: 2020-03-17 | End: 2021-10-14

## 2020-03-17 RX ORDER — AZITHROMYCIN 250 MG/1
TABLET, FILM COATED ORAL
Qty: 1 PACKET | Refills: 0 | Status: SHIPPED | OUTPATIENT
Start: 2020-03-17 | End: 2020-03-27

## 2020-03-17 RX ORDER — AZITHROMYCIN 250 MG/1
500 TABLET, FILM COATED ORAL ONCE
Status: COMPLETED | OUTPATIENT
Start: 2020-03-17 | End: 2020-03-17

## 2020-03-17 RX ORDER — PROMETHAZINE HYDROCHLORIDE AND CODEINE PHOSPHATE 6.25; 1 MG/5ML; MG/5ML
5 SYRUP ORAL 4 TIMES DAILY PRN
Qty: 120 ML | Refills: 0 | Status: SHIPPED | OUTPATIENT
Start: 2020-03-17 | End: 2020-03-24

## 2020-03-17 RX ADMIN — LIDOCAINE HYDROCHLORIDE 5 ML: 20 SOLUTION ORAL; TOPICAL at 20:18

## 2020-03-17 RX ADMIN — AZITHROMYCIN 500 MG: 250 TABLET, FILM COATED ORAL at 20:18

## 2020-03-17 ASSESSMENT — ENCOUNTER SYMPTOMS
DIARRHEA: 0
SINUS PRESSURE: 0
COUGH: 1
VOMITING: 0
ABDOMINAL PAIN: 0
CHEST TIGHTNESS: 0
SHORTNESS OF BREATH: 0
BACK PAIN: 0
TROUBLE SWALLOWING: 0
SORE THROAT: 1

## 2020-03-17 ASSESSMENT — PAIN DESCRIPTION - LOCATION: LOCATION: CHEST

## 2020-03-17 ASSESSMENT — PAIN SCALES - GENERAL: PAINLEVEL_OUTOF10: 3

## 2020-03-18 NOTE — ED PROVIDER NOTES
[address] [facility] ED  eMERGENCY dEPARTMENT eNCOUnter      Pt Name: Gay Beltrán  MRN: [id number]  [place] 1995  Date of evaluation: 3/17/2020  Provider: Damir Chan [facility]       Chief Complaint   Patient presents with    Cough    Pharyngitis         HISTORY OF PRESENT ILLNESS   (Location/Symptom, Timing/Onset,Context/Setting, Quality, Duration, Modifying Factors, Severity)  Note limiting factors. Gay Beltrán is a 27 y.o. male who presents to the emergency department with c/o cough, hoarse voice which started today. Patient denies sick contacts and denies recent travel. Patient states his throat is sore and it is worse with swallowing. Patient denies any nausea, vomiting or diarrhea. Patient denies any fever or chills. Patient states that he is started coughing today. On physical exam the patient's throat is erythematous and there is a small little exudate on his left tonsil as well is a slightly swollen uvula. Patient is not drooling and he handles his secretions well. Patient describes the pain is a burning type pain in his throat worse with swallowing. The history is provided by the patient. NursingNotes were reviewed. REVIEW OF SYSTEMS    (2-9 systems for level 4, 10 or more for level 5)     Review of Systems   Constitutional: Negative for activity change, appetite change, chills, fever and unexpected weight change. HENT: Positive for congestion and sore throat. Negative for drooling, ear pain, nosebleeds, sinus pressure and trouble swallowing. Respiratory: Positive for cough. Negative for chest tightness and shortness of breath. Cardiovascular: Negative for chest pain and leg swelling. Gastrointestinal: Negative for abdominal pain, diarrhea and vomiting. Endocrine: Negative for polydipsia and polyphagia. Genitourinary: Negative for dysuria, flank pain and frequency. Musculoskeletal: Negative for back pain and myalgias.    Skin: Negative Mouth/Throat:      Mouth: Mucous membranes are moist.      Dentition: No gingival swelling or dental caries. Tongue: No lesions. Tongue does not deviate from midline. Pharynx: Pharyngeal swelling, oropharyngeal exudate and posterior oropharyngeal erythema present. Tonsils: Tonsillar exudate present. 3+ on the right. 3+ on the left. Eyes:      General: No scleral icterus. Right eye: No foreign body or discharge. Left eye: No discharge. Extraocular Movements: Extraocular movements intact. Conjunctiva/sclera: Conjunctivae normal.      Left eye: No exudate. Pupils: Pupils are equal, round, and reactive to light. Neck:      Musculoskeletal: Normal range of motion and neck supple. No neck rigidity. Vascular: No JVD. Trachea: No tracheal deviation. Comments: No meningismus. Cardiovascular:      Rate and Rhythm: Normal rate and regular rhythm. Heart sounds: Normal heart sounds. Heart sounds not distant. No murmur. No friction rub. No gallop. Pulmonary:      Effort: Pulmonary effort is normal. No respiratory distress. Breath sounds: Normal breath sounds. No stridor. No wheezing, rhonchi or rales. Chest:      Chest wall: No tenderness. Abdominal:      General: Abdomen is flat. Bowel sounds are normal. There is no distension. Palpations: Abdomen is soft. There is no mass. Tenderness: There is no abdominal tenderness. There is no right CVA tenderness, left CVA tenderness, guarding or rebound. Hernia: No hernia is present. Musculoskeletal: Normal range of motion. General: No swelling, tenderness, deformity or signs of injury. Lymphadenopathy:      Head:      Right side of head: No submental adenopathy. Left side of head: No submental adenopathy. Cervical: Cervical adenopathy ( Anterior) present. Skin:     General: Skin is warm and dry. Capillary Refill: Capillary refill takes less than 2 seconds.

## 2020-07-09 ENCOUNTER — APPOINTMENT (OUTPATIENT)
Dept: GENERAL RADIOLOGY | Age: 25
End: 2020-07-09

## 2020-07-09 ENCOUNTER — HOSPITAL ENCOUNTER (EMERGENCY)
Age: 25
Discharge: HOME | End: 2020-07-10

## 2020-07-09 LAB
ALBUMIN SERPL-MCNC: 4.6 G/DL (ref 3.5–4.6)
ALP BLD-CCNC: 56 U/L (ref 35–104)
ALT SERPL-CCNC: 21 U/L (ref 0–41)
ANION GAP SERPL CALCULATED.3IONS-SCNC: 7 MEQ/L (ref 9–15)
AST SERPL-CCNC: 18 U/L (ref 0–40)
BASOPHILS ABSOLUTE: 0 K/UL (ref 0–0.2)
BASOPHILS RELATIVE PERCENT: 0.3 %
BILIRUB SERPL-MCNC: 0.4 MG/DL (ref 0.2–0.7)
BUN BLDV-MCNC: 9 MG/DL (ref 6–20)
CALCIUM SERPL-MCNC: 9.4 MG/DL (ref 8.5–9.9)
CHLORIDE BLD-SCNC: 99 MEQ/L (ref 95–107)
CO2: 26 MEQ/L (ref 20–31)
CREAT SERPL-MCNC: 0.79 MG/DL (ref 0.7–1.2)
EOSINOPHILS ABSOLUTE: 0.5 K/UL (ref 0–0.7)
EOSINOPHILS RELATIVE PERCENT: 5 %
GFR AFRICAN AMERICAN: >60
GFR NON-AFRICAN AMERICAN: >60
GLOBULIN: 2.5 G/DL (ref 2.3–3.5)
GLUCOSE BLD-MCNC: 94 MG/DL (ref 70–99)
HCT VFR BLD CALC: 47.7 % (ref 42–52)
HEMOGLOBIN: 16.8 G/DL (ref 14–18)
LYMPHOCYTES ABSOLUTE: 3.6 K/UL (ref 1–4.8)
LYMPHOCYTES RELATIVE PERCENT: 35.7 %
MCH RBC QN AUTO: 30.6 PG (ref 27–31.3)
MCHC RBC AUTO-ENTMCNC: 35.2 % (ref 33–37)
MCV RBC AUTO: 86.8 FL (ref 80–100)
MONOCYTES ABSOLUTE: 0.6 K/UL (ref 0.2–0.8)
MONOCYTES RELATIVE PERCENT: 6 %
NEUTROPHILS ABSOLUTE: 5.3 K/UL (ref 1.4–6.5)
NEUTROPHILS RELATIVE PERCENT: 53 %
PDW BLD-RTO: 12.9 % (ref 11.5–14.5)
PLATELET # BLD: 172 K/UL (ref 130–400)
POTASSIUM SERPL-SCNC: 3.9 MEQ/L (ref 3.4–4.9)
RBC # BLD: 5.49 M/UL (ref 4.7–6.1)
SODIUM BLD-SCNC: 132 MEQ/L (ref 135–144)
TOTAL PROTEIN: 7.1 G/DL (ref 6.3–8)
WBC # BLD: 10 K/UL (ref 4.8–10.8)

## 2020-07-09 PROCEDURE — 99283 EMERGENCY DEPT VISIT LOW MDM: CPT

## 2020-07-09 PROCEDURE — 6360000002 HC RX W HCPCS: Performed by: PHYSICIAN ASSISTANT

## 2020-07-09 PROCEDURE — 80053 COMPREHEN METABOLIC PANEL: CPT

## 2020-07-09 PROCEDURE — 2580000003 HC RX 258: Performed by: PHYSICIAN ASSISTANT

## 2020-07-09 PROCEDURE — 85025 COMPLETE CBC W/AUTO DIFF WBC: CPT

## 2020-07-09 PROCEDURE — 6370000000 HC RX 637 (ALT 250 FOR IP): Performed by: PHYSICIAN ASSISTANT

## 2020-07-09 PROCEDURE — 96374 THER/PROPH/DIAG INJ IV PUSH: CPT

## 2020-07-09 PROCEDURE — 74018 RADEX ABDOMEN 1 VIEW: CPT

## 2020-07-09 PROCEDURE — 36415 COLL VENOUS BLD VENIPUNCTURE: CPT

## 2020-07-09 RX ORDER — ACETAMINOPHEN 500 MG
1000 TABLET ORAL ONCE
Status: COMPLETED | OUTPATIENT
Start: 2020-07-09 | End: 2020-07-09

## 2020-07-09 RX ORDER — 0.9 % SODIUM CHLORIDE 0.9 %
1000 INTRAVENOUS SOLUTION INTRAVENOUS ONCE
Status: COMPLETED | OUTPATIENT
Start: 2020-07-09 | End: 2020-07-10

## 2020-07-09 RX ORDER — ONDANSETRON 2 MG/ML
4 INJECTION INTRAMUSCULAR; INTRAVENOUS ONCE
Status: COMPLETED | OUTPATIENT
Start: 2020-07-09 | End: 2020-07-09

## 2020-07-09 RX ADMIN — SODIUM CHLORIDE 1000 ML: 9 INJECTION, SOLUTION INTRAVENOUS at 23:07

## 2020-07-09 RX ADMIN — ONDANSETRON 4 MG: 2 INJECTION INTRAMUSCULAR; INTRAVENOUS at 23:03

## 2020-07-09 RX ADMIN — HYDROMORPHONE HYDROCHLORIDE 1 MG: 1 INJECTION, SOLUTION INTRAMUSCULAR; INTRAVENOUS; SUBCUTANEOUS at 23:03

## 2020-07-09 RX ADMIN — ACETAMINOPHEN 1000 MG: 500 TABLET ORAL at 23:03

## 2020-07-09 ASSESSMENT — PAIN SCALES - GENERAL
PAINLEVEL_OUTOF10: 5
PAINLEVEL_OUTOF10: 5

## 2020-07-09 ASSESSMENT — PAIN DESCRIPTION - PAIN TYPE: TYPE: ACUTE PAIN

## 2020-07-09 ASSESSMENT — PAIN DESCRIPTION - LOCATION: LOCATION: ABDOMEN

## 2020-07-10 VITALS
RESPIRATION RATE: 18 BRPM | HEIGHT: 70 IN | WEIGHT: 222 LBS | DIASTOLIC BLOOD PRESSURE: 93 MMHG | BODY MASS INDEX: 31.78 KG/M2 | OXYGEN SATURATION: 99 % | SYSTOLIC BLOOD PRESSURE: 131 MMHG | HEART RATE: 83 BPM | TEMPERATURE: 98.5 F

## 2020-07-10 RX ORDER — ONDANSETRON 4 MG/1
4 TABLET, FILM COATED ORAL 3 TIMES DAILY PRN
Qty: 15 TABLET | Refills: 0 | Status: SHIPPED | OUTPATIENT
Start: 2020-07-10

## 2020-07-10 ASSESSMENT — ENCOUNTER SYMPTOMS
COLOR CHANGE: 0
RHINORRHEA: 0
BACK PAIN: 0
CHEST TIGHTNESS: 0
NAUSEA: 1
SINUS PAIN: 0
DIARRHEA: 0
COUGH: 0
EYE REDNESS: 0
VOMITING: 1
EYE DISCHARGE: 0
SHORTNESS OF BREATH: 0
ABDOMINAL PAIN: 1

## 2020-07-10 ASSESSMENT — PAIN SCALES - GENERAL: PAINLEVEL_OUTOF10: 2

## 2020-07-10 NOTE — ED PROVIDER NOTES
[address] [facility] ED  eMERGENCY dEPARTMENTeNCOUnter      Pt Name: Julian Poe  MRN: [id number]  [place] 1995  Date ofevaluation: 7/9/2020  Provider: Beryl Favre, PA-C    CHIEF COMPLAINT       Chief Complaint   Patient presents with    Constipation     no bowel movement since monday  c/io nausea and vomiting   nothing stays  down          HISTORY OF PRESENT ILLNESS   (Location/Symptom, Timing/Onset,Context/Setting, Quality, Duration, Modifying Factors, Severity)  Note limiting factors. Julian Poe is a 27 y.o. male who presents to the emergency department gradual onset, intermittent, crampy, diffuse abdominal pain with associated nausea and vomiting. Patient states that started 2 days ago. Patient denies fevers cough sore throat. Patient denies sick contacts. Patient states nobody else in the house as symptoms. HPI    NursingNotes were reviewed. REVIEW OF SYSTEMS    (2-9 systems for level 4, 10 or more for level 5)     Review of Systems   Constitutional: Negative for activity change, chills, fatigue and fever. HENT: Negative for congestion, hearing loss, rhinorrhea, sinus pain, sneezing and tinnitus. Eyes: Negative for discharge, redness and visual disturbance. Respiratory: Negative for cough, chest tightness and shortness of breath. Cardiovascular: Negative for chest pain and leg swelling. Gastrointestinal: Positive for abdominal pain, nausea and vomiting. Negative for diarrhea. Endocrine: Negative for heat intolerance, polydipsia and polyuria. Genitourinary: Negative for dysuria, flank pain, hematuria and urgency. Musculoskeletal: Negative for back pain, joint swelling and myalgias. Skin: Negative for color change, rash and wound. Allergic/Immunologic: Negative for immunocompromised state. Neurological: Negative for tremors, seizures, syncope, light-headedness and headaches. Hematological: Does not bruise/bleed easily.    Psychiatric/Behavioral: Negative for agitation and behavioral problems. The patient is not nervous/anxious. Except as noted above the remainder of the review of systems was reviewed and negative. PAST MEDICAL HISTORY     Past Medical History:   Diagnosis Date    Asthma          SURGICALHISTORY     No past surgical history on file. CURRENT MEDICATIONS       Previous Medications    ACETAMINOPHEN (TYLENOL) 325 MG TABLET    Take 2 tablets by mouth every 6 hours as needed for Pain or Fever    ALBUTEROL (ACCUNEB) 0.63 MG/3ML NEBULIZER SOLUTION    Take 1 ampule by nebulization every 6 hours as needed for Wheezing    ALBUTEROL (PROVENTIL) (5 MG/ML) 0.5% NEBULIZER SOLUTION    Take 0.5 mLs by nebulization every 6 hours as needed for Wheezing    ALBUTEROL SULFATE HFA (PROVENTIL HFA) 108 (90 BASE) MCG/ACT INHALER    Inhale 2 puffs into the lungs every 6 hours as needed for Wheezing    ALBUTEROL SULFATE HFA (PROVENTIL HFA) 108 (90 BASE) MCG/ACT INHALER    Inhale 1-2 puffs into the lungs every 4 hours as needed for Wheezing or Shortness of Breath (Space out to every 6 hours as symptoms improve) Space out to every 6 hours as symptoms improve. DICYCLOMINE (BENTYL) 10 MG CAPSULE    Take 1 capsule by mouth every 6 hours as needed (cramps)    ERYTHROMYCIN (ROMYCIN) 5 MG/GM OPHTHALMIC OINTMENT    Place into the right eye every 6 hours    LIDOCAINE VISCOUS HCL (XYLOCAINE) 2 % SOLN SOLUTION    Take 5 mLs by mouth every 3 hours as needed for Irritation    LORATADINE-PSEUDOEPHEDRINE (CLARITIN-D 12 HOUR) 5-120 MG PER EXTENDED RELEASE TABLET    Take 1 tablet by mouth 2 times daily    ONDANSETRON (ZOFRAN) 4 MG TABLET    Take 4 mg by mouth every 8 hours as needed for Nausea or Vomiting       ALLERGIES     Amoxicillin; Aspirin; Ibuprofen; Nsaids; and Pcn [penicillins]    FAMILY HISTORY     No family history on file.        SOCIAL HISTORY       Social History     Socioeconomic History    Marital status: Single     Spouse name: Not on file    Number of children: Not on file    Years of education: Not on file    Highest education level: Not on file   Occupational History    Not on file   Social Needs    Financial resource strain: Not on file    Food insecurity     Worry: Not on file     Inability: Not on file    Transportation needs     Medical: Not on file     Non-medical: Not on file   Tobacco Use    Smoking status: Current Every Day Smoker     Packs/day: 1.00     Types: Cigarettes    Smokeless tobacco: Current User     Types: Chew   Substance and Sexual Activity    Alcohol use: Yes     Comment: \"beer now and than\"    Drug use: No    Sexual activity: Not on file   Lifestyle    Physical activity     Days per week: Not on file     Minutes per session: Not on file    Stress: Not on file   Relationships    Social connections     Talks on phone: Not on file     Gets together: Not on file     Attends Synagogue service: Not on file     Active member of club or organization: Not on file     Attends meetings of clubs or organizations: Not on file     Relationship status: Not on file    Intimate partner violence     Fear of current or ex partner: Not on file     Emotionally abused: Not on file     Physically abused: Not on file     Forced sexual activity: Not on file   Other Topics Concern    Not on file   Social History Narrative    Not on file       SCREENINGS              PHYSICAL EXAM    (up to 7 for level 4, 8 or more for level 5)     ED Triage Vitals [07/09/20 2130]   BP Temp Temp Source Pulse Resp SpO2 Height Weight   (!) 135/92 98.5 °F (36.9 °C) Oral 78 18 97 % -- --       Physical Exam  Vitals signs and nursing note reviewed. Constitutional:       Appearance: Normal appearance. He is not ill-appearing, toxic-appearing or diaphoretic. HENT:      Head: Normocephalic.       Right Ear: Tympanic membrane and external ear normal.      Left Ear: Tympanic membrane and external ear normal.      Nose: Nose normal.      Mouth/Throat:      Mouth: Mucous membranes are moist.   Eyes:      Extraocular Movements: Extraocular movements intact. Pupils: Pupils are equal, round, and reactive to light. Neck:      Musculoskeletal: Normal range of motion and neck supple. Cardiovascular:      Rate and Rhythm: Normal rate and regular rhythm. Pulses: Normal pulses. Heart sounds: Normal heart sounds. Pulmonary:      Effort: Pulmonary effort is normal.      Breath sounds: Normal breath sounds. Abdominal:      General: Abdomen is flat. Bowel sounds are normal. There is no distension. Palpations: Abdomen is soft. Tenderness: There is no abdominal tenderness. Musculoskeletal: Normal range of motion. General: No swelling, tenderness or signs of injury. Skin:     General: Skin is warm and dry. Capillary Refill: Capillary refill takes less than 2 seconds. Findings: No erythema. Neurological:      General: No focal deficit present. Mental Status: He is alert and oriented to person, place, and time. Psychiatric:         Mood and Affect: Mood normal.         DIAGNOSTIC RESULTS     EKG: All EKG's are interpreted by the Emergency Department Physician who either signs or Co-signsthis chart in the absence of a cardiologist.      RADIOLOGY:   Pricilla Knife such as CT, Ultrasound and MRI are read by the radiologist. Plain radiographic images are visualized and preliminarily interpreted by the emergency physician with the below findings:    No signs of bowel obstruction or increased stool load.     Interpretation per the Radiologist below, if available at the time ofthis note:    XR ABDOMEN (KUB) (SINGLE AP VIEW)    (Results Pending)         ED BEDSIDE ULTRASOUND:   Performed by ED Physician - none    LABS:  Labs Reviewed   COMPREHENSIVE METABOLIC PANEL - Abnormal; Notable for the following components:       Result Value    Sodium 132 (*)     Anion Gap 7 (*)     All other components within normal limits   CBC WITH AUTO DIFFERENTIAL       All other labs were within normal range or not returned as of this dictation. EMERGENCY DEPARTMENT COURSE and DIFFERENTIAL DIAGNOSIS/MDM:   Vitals:    Vitals:    07/09/20 2130   BP: (!) 135/92   Pulse: 78   Resp: 18   Temp: 98.5 °F (36.9 °C)   TempSrc: Oral   SpO2: 97%       25-year of age male who presents with 2-day history of nausea vomiting abdominal pain. CBC, CMP, KUB obtained. Patient given a 1 L fluid bolus and Zofran for nausea. Patient also given IV Dilaudid for pain. On reassessment patient's pain better controlled no longer nauseous p.o. challenge was obtained with soda. Patient was able to tolerate p.o. intake patient will be discharged home with close follow-up with a PCP for which I referred him and a prescription for Zofran for nausea. Patient advised of what symptoms to return to the ED patient understand and agreeable to this plan. MDM  Number of Diagnoses or Management Options  Acute gastritis without hemorrhage, unspecified gastritis type:         REASSESSMENT              CONSULTS:  None    PROCEDURES:  Unless otherwise noted below, none     Procedures    FINAL IMPRESSION      1. Acute gastritis without hemorrhage, unspecified gastritis type          DISPOSITION/PLAN   DISPOSITION Decision To Discharge 07/10/2020 12:00:15 AM      PATIENT REFERREDTO:  Frank Martínez MD  [address] [place] [address], [facility], [address]  [address] [facility]  [phone]    Call in 2 days  As needed      DISCHARGEMEDICATIONS:  New Prescriptions    ONDANSETRON (ZOFRAN) 4 MG TABLET    Take 1 tablet by mouth 3 times daily as needed for Nausea or Vomiting     Controlled Substances Monitoring:     No flowsheet data found.     (Please note that portions of this note were completed with a voice recognition program.  Efforts were made to edit the dictations but occasionally words are mis-transcribed.)    Madeline Dockery PA-C (electronically signed)           Madeline Dockery PA-C  07/10/20 0018

## 2021-02-11 ENCOUNTER — HOSPITAL ENCOUNTER (EMERGENCY)
Age: 26
Discharge: HOME | End: 2021-02-11

## 2021-02-11 VITALS
DIASTOLIC BLOOD PRESSURE: 78 MMHG | OXYGEN SATURATION: 99 % | SYSTOLIC BLOOD PRESSURE: 148 MMHG | BODY MASS INDEX: 32.58 KG/M2 | TEMPERATURE: 97.6 F | RESPIRATION RATE: 20 BRPM | WEIGHT: 215 LBS | HEART RATE: 89 BPM | HEIGHT: 68 IN

## 2021-02-11 DIAGNOSIS — S05.01XA ABRASION OF RIGHT CORNEA, INITIAL ENCOUNTER: Primary | ICD-10-CM

## 2021-02-11 PROCEDURE — 6370000000 HC RX 637 (ALT 250 FOR IP): Performed by: NURSE PRACTITIONER

## 2021-02-11 PROCEDURE — 99284 EMERGENCY DEPT VISIT MOD MDM: CPT

## 2021-02-11 RX ORDER — ERYTHROMYCIN 5 MG/G
OINTMENT OPHTHALMIC EVERY 6 HOURS
Qty: 1 TUBE | Refills: 0 | Status: SHIPPED | OUTPATIENT
Start: 2021-02-11 | End: 2021-10-14

## 2021-02-11 RX ORDER — TETRACAINE HYDROCHLORIDE 5 MG/ML
1 SOLUTION OPHTHALMIC ONCE
Status: COMPLETED | OUTPATIENT
Start: 2021-02-11 | End: 2021-02-11

## 2021-02-11 RX ADMIN — FLUORESCEIN SODIUM 1 MG: 1 STRIP OPHTHALMIC at 09:46

## 2021-02-11 RX ADMIN — TETRACAINE HYDROCHLORIDE 1 DROP: 5 SOLUTION OPHTHALMIC at 09:46

## 2021-02-11 ASSESSMENT — ENCOUNTER SYMPTOMS
NAUSEA: 0
EYE DISCHARGE: 1
RHINORRHEA: 0
COUGH: 0
SORE THROAT: 0
VOMITING: 0
SHORTNESS OF BREATH: 0
EYE PAIN: 1
ABDOMINAL PAIN: 0
BLOOD IN STOOL: 0
CONSTIPATION: 0
TROUBLE SWALLOWING: 0
COLOR CHANGE: 0
DIARRHEA: 0
EYE REDNESS: 1
WHEEZING: 0
PHOTOPHOBIA: 1
BACK PAIN: 0

## 2021-02-11 ASSESSMENT — PAIN DESCRIPTION - ONSET: ONSET: ON-GOING

## 2021-02-11 ASSESSMENT — PAIN DESCRIPTION - PROGRESSION: CLINICAL_PROGRESSION: GRADUALLY WORSENING

## 2021-02-11 ASSESSMENT — PAIN SCALES - GENERAL: PAINLEVEL_OUTOF10: 3

## 2021-02-11 ASSESSMENT — VISUAL ACUITY
OU: 20/20
OS: 20/30

## 2021-02-11 ASSESSMENT — PAIN DESCRIPTION - ORIENTATION: ORIENTATION: RIGHT

## 2021-02-11 ASSESSMENT — PAIN DESCRIPTION - FREQUENCY: FREQUENCY: INTERMITTENT

## 2021-02-11 NOTE — ED PROVIDER NOTES
[address] [facility] ED  EMERGENCY DEPARTMENT ENCOUNTER      Pt Name: Rohit Torres  MRN: [id number]  [place] 1995  Date of evaluation: 2/11/2021  Provider: COURTNEY Lin CNP    CHIEF COMPLAINT       Chief Complaint   Patient presents with    Eye Pain     since yesterday. put eye drops in. woke up today with increase in pain and redness around eye. HISTORY OF PRESENT ILLNESS   (Location/Symptom, Timing/Onset,Context/Setting, Quality, Duration, Modifying Factors, Severity)  Note limiting factors. Rohit Torres is a 23 y.o. male who presents to the emergency department with no past medical history for chief complaint of sudden onset right-sided pain and tearing since last night. Patient reports that a piece of wood flew into his right eye while he was working but he started coming out and then he developed redness and photophobia worsening today. He denies visual disturbance. Has no headaches. Has no bleeding. Has no alleviating or modifying factors. Nursing Notes were reviewed. REVIEW OF SYSTEMS    (2-9 systems for level 4, 10 or more for level 5)     Review of Systems   Constitutional: Negative for activity change, appetite change, fatigue and fever. HENT: Negative for congestion, ear pain, rhinorrhea, sore throat and trouble swallowing. Eyes: Positive for photophobia, pain, discharge and redness. Respiratory: Negative for cough, shortness of breath and wheezing. Cardiovascular: Negative for chest pain and palpitations. Gastrointestinal: Negative for abdominal pain, blood in stool, constipation, diarrhea, nausea and vomiting. Endocrine: Negative for polydipsia and polyuria. Genitourinary: Negative for decreased urine volume, dysuria, flank pain and hematuria. Musculoskeletal: Negative for arthralgias, back pain and myalgias. Skin: Negative for color change, rash and wound. Neurological: Negative for dizziness, syncope, weakness, light-headedness and headaches. Psychiatric/Behavioral: Negative for behavioral problems. All other systems reviewed and are negative. Except as noted above the remainder of the review of systems was reviewed and negative. PAST MEDICAL HISTORY     Past Medical History:   Diagnosis Date    Asthma      History reviewed. No pertinent surgical history. Social History     Socioeconomic History    Marital status: Single     Spouse name: None    Number of children: None    Years of education: None    Highest education level: None   Occupational History    None   Social Needs    Financial resource strain: None    Food insecurity     Worry: None     Inability: None    Transportation needs     Medical: None     Non-medical: None   Tobacco Use    Smoking status: Current Every Day Smoker     Packs/day: 1.00     Types: Cigarettes    Smokeless tobacco: Current User     Types: Chew   Substance and Sexual Activity    Alcohol use: Yes     Comment: \"beer now and than\"    Drug use: No    Sexual activity: None   Lifestyle    Physical activity     Days per week: None     Minutes per session: None    Stress: None   Relationships    Social connections     Talks on phone: None     Gets together: None     Attends Anglican service: None     Active member of club or organization: None     Attends meetings of clubs or organizations: None     Relationship status: None    Intimate partner violence     Fear of current or ex partner: None     Emotionally abused: None     Physically abused: None     Forced sexual activity: None   Other Topics Concern    None   Social History Narrative    None       SCREENINGS             PHYSICAL EXAM    (up to 7 for level 4, 8 or more for level 5)     ED Triage Vitals [02/11/21 0919]   BP Temp Temp src Pulse Resp SpO2 Height Weight   (!) 148/78 97.6 °F (36.4 °C) -- 89 20 99 % 5' 8\" (1.727 m) 215 lb (97.5 kg)       Physical Exam  Vitals signs and nursing note reviewed.    Constitutional:       General: He is not in acute distress. Appearance: He is well-developed. He is not diaphoretic. HENT:      Head: Normocephalic and atraumatic. Nose: Nose normal.   Eyes:      Conjunctiva/sclera: Conjunctivae normal.      Pupils: Pupils are equal, round, and reactive to light. Right eye: Pupil is reactive. Corneal abrasion present. Left eye: Pupil is reactive. No corneal abrasion or fluorescein uptake. Neck:      Musculoskeletal: Normal range of motion and neck supple. Cardiovascular:      Rate and Rhythm: Normal rate and regular rhythm. Heart sounds: Normal heart sounds. Pulmonary:      Effort: Pulmonary effort is normal. No respiratory distress. Breath sounds: Normal breath sounds. No wheezing. Abdominal:      General: Bowel sounds are normal.      Palpations: Abdomen is soft. Tenderness: There is no abdominal tenderness. Skin:     General: Skin is warm and dry. Capillary Refill: Capillary refill takes less than 2 seconds. Findings: No rash. Neurological:      Mental Status: He is alert and oriented to person, place, and time. Cranial Nerves: No cranial nerve deficit. Psychiatric:         Behavior: Behavior normal.         RESULTS     EKG: All EKG's are interpreted by the Emergency Department Physician who either signs or Co-signsthis chart in the absence of a cardiologist.        RADIOLOGY:   [place]oe such as CT, Ultrasound and MRI are read by the radiologist. Plain radiographic images are visualized and preliminarily interpreted by the emergency physician with the below findings:        Interpretation per the Radiologist below, if available at the time ofthis note:    No orders to display         ED BEDSIDE ULTRASOUND:   Performed by ED Physician - none    LABS:  Labs Reviewed - No data to display    All other labs were within normal range or not returned as of this dictation.     EMERGENCY DEPARTMENT COURSE and DIFFERENTIAL DIAGNOSIS/MDM:   Vitals: Vitals:    02/11/21 0919   BP: (!) 148/78   Pulse: 89   Resp: 20   Temp: 97.6 °F (36.4 °C)   SpO2: 99%   Weight: 215 lb (97.5 kg)   Height: 5' 8\" (1.727 m)            MDM     Patient is a 53-year-old male presenting to the ER with a chief complaint of right eye injury. Patient is hemodynamically stable nontoxic-appearing. Patient examination of eye showed corneal abrasion on the right side. Patient tolerated procedure well. Patient instructed to follow-up with [facility] eye surgery today or tomorrow. Patient given a prescription for erythromycin ointment. Instructed to return back to the ER if worse in any way or new symptoms develop. Patient discharged in stable condition with stable vital signs. CRITICAL CARE TIME       CONSULTS:  None    PROCEDURES:  Unless otherwise noted below, none     Procedures    FINAL IMPRESSION      1.  Abrasion of right cornea, initial encounter          DISPOSITION/PLAN   DISPOSITION Decision To Discharge 02/11/2021 10:04:32 AM      PATIENT REFERRED TO:  [facility]  [id number] [facility] [id number]  Schedule an appointment as soon as possible for a visit in 1 day        DISCHARGE MEDICATIONS:  New Prescriptions    ERYTHROMYCIN (ROMYCIN) 5 MG/GM OPHTHALMIC OINTMENT    Place into both eyes every 6 hours          (Please notethat portions of this note were completed with a voice recognition program.  Efforts were made to edit the dictations but occasionally words are mis-transcribed.)    COURTNEY Lara CNP (electronically signed)  Attending Emergency Physician          COURTNEY Cobos CNP  02/11/21 1001

## 2021-02-11 NOTE — ED TRIAGE NOTES
Patient arrived from home via family with complaint of possibly getting something into right eye 2 days ago. Per patient his eye was just irritated so he put eye drops in it yesterday with no relief. patient A&OX4. Skin pink, warm, and dry. Redness noted to eye and swelling around eye.

## 2021-02-11 NOTE — ED NOTES
D/C instructions and rx given. Pt verbalized understanding. States he is still light sensitive and wants patch over eye. NP [place] aware. Patch applied over right eye. Pt denies any further complaints. Ambulated out with steady gait.      Sarina Peabody, RN  02/11/21 1016

## 2021-08-31 ENCOUNTER — HOSPITAL ENCOUNTER (EMERGENCY)
Age: 26
Discharge: HOME | End: 2021-08-31

## 2021-08-31 ENCOUNTER — APPOINTMENT (OUTPATIENT)
Dept: GENERAL RADIOLOGY | Age: 26
End: 2021-08-31

## 2021-08-31 VITALS
OXYGEN SATURATION: 99 % | WEIGHT: 226 LBS | SYSTOLIC BLOOD PRESSURE: 127 MMHG | TEMPERATURE: 98.5 F | RESPIRATION RATE: 18 BRPM | HEART RATE: 77 BPM | BODY MASS INDEX: 34.25 KG/M2 | HEIGHT: 68 IN | DIASTOLIC BLOOD PRESSURE: 81 MMHG

## 2021-08-31 DIAGNOSIS — M25.571 ACUTE RIGHT ANKLE PAIN: Primary | ICD-10-CM

## 2021-08-31 PROCEDURE — 73610 X-RAY EXAM OF ANKLE: CPT

## 2021-08-31 PROCEDURE — 99283 EMERGENCY DEPT VISIT LOW MDM: CPT

## 2021-08-31 PROCEDURE — 6370000000 HC RX 637 (ALT 250 FOR IP): Performed by: NURSE PRACTITIONER

## 2021-08-31 PROCEDURE — 73630 X-RAY EXAM OF FOOT: CPT

## 2021-08-31 RX ORDER — ACETAMINOPHEN 500 MG
1000 TABLET ORAL ONCE
Status: COMPLETED | OUTPATIENT
Start: 2021-08-31 | End: 2021-08-31

## 2021-08-31 RX ADMIN — ACETAMINOPHEN 1000 MG: 500 TABLET ORAL at 14:12

## 2021-08-31 ASSESSMENT — PAIN DESCRIPTION - FREQUENCY: FREQUENCY: CONTINUOUS

## 2021-08-31 ASSESSMENT — PAIN - FUNCTIONAL ASSESSMENT
PAIN_FUNCTIONAL_ASSESSMENT: 0-10
PAIN_FUNCTIONAL_ASSESSMENT: PREVENTS OR INTERFERES WITH MANY ACTIVE NOT PASSIVE ACTIVITIES

## 2021-08-31 ASSESSMENT — PAIN DESCRIPTION - ONSET: ONSET: ON-GOING

## 2021-08-31 ASSESSMENT — PAIN DESCRIPTION - PAIN TYPE
TYPE: ACUTE PAIN
TYPE: ACUTE PAIN

## 2021-08-31 ASSESSMENT — PAIN DESCRIPTION - DESCRIPTORS
DESCRIPTORS: ACHING
DESCRIPTORS: PRESSURE;SHARP

## 2021-08-31 ASSESSMENT — PAIN DESCRIPTION - ORIENTATION
ORIENTATION: RIGHT
ORIENTATION: RIGHT

## 2021-08-31 ASSESSMENT — PAIN SCALES - GENERAL
PAINLEVEL_OUTOF10: 6
PAINLEVEL_OUTOF10: 6
PAINLEVEL_OUTOF10: 3

## 2021-08-31 ASSESSMENT — ENCOUNTER SYMPTOMS
COUGH: 0
ABDOMINAL PAIN: 0
SHORTNESS OF BREATH: 0
BACK PAIN: 0

## 2021-08-31 ASSESSMENT — PAIN DESCRIPTION - LOCATION
LOCATION: ANKLE
LOCATION: ANKLE

## 2021-08-31 NOTE — ED PROVIDER NOTES
[facility] ED  eMERGENCY dEPARTMENT eNCOUnter      Pt Name: Author Bess  MRN: [id number]  [place] 1995  Date of evaluation: 8/31/2021  Provider: COURTNEY Fenton CNP      HISTORY OF PRESENT ILLNESS    Author Shahriar is a 23 y.o. male who presents to the Emergency Department with R ankle pain that started this morning. Patient denies any injury or trauma. He states it is painful to ambulate on. Pain is moderate. REVIEW OF SYSTEMS       Review of Systems   Constitutional: Negative for activity change, appetite change and fever. HENT: Negative for congestion. Respiratory: Negative for cough and shortness of breath. Cardiovascular: Negative for chest pain. Gastrointestinal: Negative for abdominal pain. Genitourinary: Negative for dysuria. Musculoskeletal: Negative for arthralgias and back pain. R ankle pain   Skin: Negative for rash. All other systems reviewed and are negative. PAST MEDICAL HISTORY     Past Medical History:   Diagnosis Date    Asthma          SURGICAL HISTORY     No past surgical history on file. CURRENT MEDICATIONS       Previous Medications    ACETAMINOPHEN (TYLENOL) 325 MG TABLET    Take 2 tablets by mouth every 6 hours as needed for Pain or Fever    ALBUTEROL (ACCUNEB) 0.63 MG/3ML NEBULIZER SOLUTION    Take 1 ampule by nebulization every 6 hours as needed for Wheezing    ALBUTEROL (PROVENTIL) (5 MG/ML) 0.5% NEBULIZER SOLUTION    Take 0.5 mLs by nebulization every 6 hours as needed for Wheezing    ALBUTEROL SULFATE HFA (PROVENTIL HFA) 108 (90 BASE) MCG/ACT INHALER    Inhale 2 puffs into the lungs every 6 hours as needed for Wheezing    ALBUTEROL SULFATE HFA (PROVENTIL HFA) 108 (90 BASE) MCG/ACT INHALER    Inhale 1-2 puffs into the lungs every 4 hours as needed for Wheezing or Shortness of Breath (Space out to every 6 hours as symptoms improve) Space out to every 6 hours as symptoms improve.     DICYCLOMINE (BENTYL) 10 MG CAPSULE Take 1 capsule by mouth every 6 hours as needed (cramps)    ERYTHROMYCIN (ROMYCIN) 5 MG/GM OPHTHALMIC OINTMENT    Place into the right eye every 6 hours    ERYTHROMYCIN (ROMYCIN) 5 MG/GM OPHTHALMIC OINTMENT    Place into both eyes every 6 hours    LIDOCAINE VISCOUS HCL (XYLOCAINE) 2 % SOLN SOLUTION    Take 5 mLs by mouth every 3 hours as needed for Irritation    LORATADINE-PSEUDOEPHEDRINE (CLARITIN-D 12 HOUR) 5-120 MG PER EXTENDED RELEASE TABLET    Take 1 tablet by mouth 2 times daily    ONDANSETRON (ZOFRAN) 4 MG TABLET    Take 4 mg by mouth every 8 hours as needed for Nausea or Vomiting    ONDANSETRON (ZOFRAN) 4 MG TABLET    Take 1 tablet by mouth 3 times daily as needed for Nausea or Vomiting       ALLERGIES     Amoxicillin, Aspirin, Ibuprofen, Nsaids, and Pcn [penicillins]    FAMILY HISTORY     No family history on file. SOCIAL HISTORY       Social History     Socioeconomic History    Marital status: Single     Spouse name: Not on file    Number of children: Not on file    Years of education: Not on file    Highest education level: Not on file   Occupational History    Not on file   Tobacco Use    Smoking status: Current Every Day Smoker     Packs/day: 1.00     Types: Cigarettes    Smokeless tobacco: Current User     Types: Chew   Substance and Sexual Activity    Alcohol use: Yes     Comment: \"beer now and than\"    Drug use: No    Sexual activity: Not on file   Other Topics Concern    Not on file   Social History Narrative    Not on file     Social Determinants of Health     Financial Resource Strain:     Difficulty of Paying Living Expenses:    Food Insecurity:     Worried About Running Out of Food in the Last Year:     [place] of Food in the Last Year:    Transportation Needs:     Lack of Transportation (Medical):      Lack of Transportation (Non-Medical):    Physical Activity:     Days of Exercise per Week:     Minutes of Exercise per Session:    Stress:     Feeling of Stress : Social Connections:     Frequency of Communication with Friends and Family:     Frequency of Social Gatherings with Friends and Family:     Attends Restoration[employer] Services:     Active Member of Clubs or Organizations:     Attends Club or Organization Meetings:     Marital Status:    Intimate Partner Violence:     Fear of Current or Ex-Partner:     Emotionally Abused:     Physically Abused:     Sexually Abused:        SCREENINGS      @FLOW([id number])@      PHYSICAL EXAM    (up to 7 for level 4, 8 or more for level 5)     ED Triage Vitals [08/31/21 1352]   BP Temp Temp Source Pulse Resp SpO2 Height Weight   131/79 98.5 °F (36.9 °C) Oral 80 16 97 % 5' 8\" (1.727 m) 226 lb (102.5 kg)       Physical Exam  Vitals and nursing note reviewed. Constitutional:       Appearance: He is well-developed. HENT:      Head: Normocephalic and atraumatic. Right Ear: External ear normal.      Left Ear: External ear normal.   Eyes:      Conjunctiva/sclera: Conjunctivae normal.      Pupils: Pupils are equal, round, and reactive to light. Cardiovascular:      Rate and Rhythm: Normal rate and regular rhythm. Pulmonary:      Effort: Pulmonary effort is normal.      Breath sounds: Normal breath sounds. Abdominal:      General: Bowel sounds are normal. There is no distension. Palpations: Abdomen is soft. Tenderness: There is no abdominal tenderness. Musculoskeletal:         General: Normal range of motion. Cervical back: Normal range of motion and neck supple. Right ankle: No swelling or deformity. Tenderness present. Normal range of motion. Right Achilles Tendon: Normal.        Feet:    Skin:     General: Skin is warm and dry. Neurological:      Mental Status: He is alert and oriented to person, place, and time. Deep Tendon Reflexes: Reflexes are normal and symmetric.    Psychiatric:         Judgment: Judgment normal.           All other labs were within normal range or not returned as of this dictation. EMERGENCY DEPARTMENT COURSE and DIFFERENTIALDIAGNOSIS/MDM:   Vitals:    Vitals:    08/31/21 1352   BP: 131/79   Pulse: 80   Resp: 16   Temp: 98.5 °F (36.9 °C)   TempSrc: Oral   SpO2: 97%   Weight: 226 lb (102.5 kg)   Height: 5' 8\" (1.727 m)            23 yr old male with R ankle pain. Patient may continue wearing his walking boot and crutches given to assist with ambulation. F/U with podiatry in 1-2 days. Patient verbalizes understdaing. PROCEDURES:  Unless otherwise noted below, none     Procedures      FINAL IMPRESSION      1.  Acute right ankle pain          DISPOSITION/PLAN   DISPOSITION Decision To Discharge 08/31/2021 03:19:53 PM          Agustin Moritz, APRN - CNP (electronically signed)  Attending Emergency Physician     Agustin Moritz, APRN - CNP  08/31/21 COURTNEY Adams CNP  08/31/21 1529

## 2021-10-14 ENCOUNTER — VIRTUAL VISIT (OUTPATIENT)
Dept: FAMILY MEDICINE CLINIC | Age: 26
End: 2021-10-14

## 2021-10-14 DIAGNOSIS — Z20.822 ENCOUNTER BY TELEHEALTH FOR SUSPECTED COVID-19: Primary | ICD-10-CM

## 2021-10-14 PROCEDURE — 87426 SARSCOV CORONAVIRUS AG IA: CPT | Performed by: PHYSICIAN ASSISTANT

## 2021-10-14 PROCEDURE — 99213 OFFICE O/P EST LOW 20 MIN: CPT | Performed by: PHYSICIAN ASSISTANT

## 2021-10-14 SDOH — ECONOMIC STABILITY: FOOD INSECURITY: WITHIN THE PAST 12 MONTHS, THE FOOD YOU BOUGHT JUST DIDN'T LAST AND YOU DIDN'T HAVE MONEY TO GET MORE.: NEVER TRUE

## 2021-10-14 SDOH — ECONOMIC STABILITY: FOOD INSECURITY: WITHIN THE PAST 12 MONTHS, YOU WORRIED THAT YOUR FOOD WOULD RUN OUT BEFORE YOU GOT MONEY TO BUY MORE.: NEVER TRUE

## 2021-10-14 ASSESSMENT — PATIENT HEALTH QUESTIONNAIRE - PHQ9
SUM OF ALL RESPONSES TO PHQ QUESTIONS 1-9: 0
SUM OF ALL RESPONSES TO PHQ QUESTIONS 1-9: 0
2. FEELING DOWN, DEPRESSED OR HOPELESS: 0
SUM OF ALL RESPONSES TO PHQ QUESTIONS 1-9: 0
1. LITTLE INTEREST OR PLEASURE IN DOING THINGS: 0
SUM OF ALL RESPONSES TO PHQ9 QUESTIONS 1 & 2: 0

## 2021-10-14 ASSESSMENT — ENCOUNTER SYMPTOMS
NAUSEA: 0
CHEST TIGHTNESS: 0
DIARRHEA: 0
BACK PAIN: 0
VOMITING: 0
COUGH: 0
SINUS PRESSURE: 0
SHORTNESS OF BREATH: 0
ABDOMINAL PAIN: 0
SINUS PAIN: 0
SORE THROAT: 0

## 2021-10-14 ASSESSMENT — SOCIAL DETERMINANTS OF HEALTH (SDOH): HOW HARD IS IT FOR YOU TO PAY FOR THE VERY BASICS LIKE FOOD, HOUSING, MEDICAL CARE, AND HEATING?: NOT VERY HARD

## 2021-10-14 NOTE — PROGRESS NOTES
10/14/2021    TELEHEALTH EVALUATION -- Audio/Visual (During POLQJ-52 public health emergency)    Due to COVID 19 outbreak, patient's office visit was converted to a virtual visit. Patient was contacted and agreed to proceed with a virtual visit via [email or web address]. me  The risks and benefits of converting to a virtual visit were discussed in light of the current infectious disease epidemic. Patient also understood that insurance coverage and co-pays are up to their individual insurance plans. HPI:    Nishi Marshall (:  1995) has requested an audio/video evaluation for the following concern(s):    The patient presenting today with CC of COVID-19 exposure. Was exposed on 10/6/21 by Suyapa Harman. The patient tested positive last week. Patient felt fatigued yesterday. No change in taste or smell, no coughing, or rhinorrhea. Review of Systems   Constitutional: Positive for fatigue. Negative for activity change, appetite change, chills and fever. HENT: Positive for congestion. Negative for drooling, sinus pressure, sinus pain and sore throat. Eyes: Negative for visual disturbance. Respiratory: Negative for cough, chest tightness and shortness of breath. Cardiovascular: Negative for chest pain. Gastrointestinal: Negative for abdominal pain, diarrhea, nausea and vomiting. Endocrine: Negative for cold intolerance. Genitourinary: Negative for dysuria, flank pain, frequency and hematuria. Musculoskeletal: Negative for arthralgias, back pain and myalgias. Skin: Negative for rash. Allergic/Immunologic: Negative for food allergies. Neurological: Negative for weakness, light-headedness, numbness and headaches. Hematological: Does not bruise/bleed easily. Prior to Visit Medications    Medication Sig Taking?  Authorizing Provider   albuterol sulfate HFA (PROVENTIL HFA) 108 (90 Base) MCG/ACT inhaler Inhale 1-2 puffs into the lungs every 4 hours as needed for Wheezing or Shortness of Breath (Space out to every 6 hours as symptoms improve) Space out to every 6 hours as symptoms improve. Yes Clayton Virk MD   albuterol (PROVENTIL) (5 MG/ML) 0.5% nebulizer solution Take 0.5 mLs by nebulization every 6 hours as needed for Wheezing Yes Clayton Virk MD   albuterol sulfate HFA (PROVENTIL HFA) 108 (90 Base) MCG/ACT inhaler Inhale 2 puffs into the lungs every 6 hours as needed for Wheezing Yes Chey Hi MD   albuterol (ACCUNEB) 0.63 MG/3ML nebulizer solution Take 1 ampule by nebulization every 6 hours as needed for Wheezing Yes Historical Provider, MD   erythromycin [facility]) 5 MG/GM ophthalmic ointment Place into the right eye every 6 hours Yes Mati Temple MD   ondansetron (ZOFRAN) 4 MG tablet Take 1 tablet by mouth 3 times daily as needed for Nausea or Vomiting  Lin Hassan PA-C       Social History     Tobacco Use    Smoking status: Current Every Day Smoker     Packs/day: 1.00     Types: Cigarettes    Smokeless tobacco: Current User     Types: Chew   Substance Use Topics    Alcohol use: Yes     Comment: \"beer now and than\"    Drug use: No            PHYSICAL EXAMINATION:  [ INSTRUCTIONS:  \"[x]\" Indicates a positive item  \"[]\" Indicates a negative item  -- DELETE ALL ITEMS NOT EXAMINED]  Patient is A&O x3. The patient is able to speak in clear and complete sentences without dyspnea. Patient did not cough during our interaction. Patient did not sound congested. No wheezing or adventitious breath sounds. Thoughts, perception, and judgement are intact. Due to this being a TeleHealth encounter, evaluation of the following organ systems is limited: Vitals/Constitutional/EENT/Resp/CV/GI//MS/Neuro/Skin/Heme-Lymph-Imm. ASSESSMENT/PLAN:  1. Encounter by telehealth for suspected COVID-19  - Screening for exposure to work. Rapid performed today. - Discussed signs and symptoms which require immediate follow-up in ED/call to 911.  Patient verbalized understanding.  - POCT COVID-19, Antigen      Return if symptoms worsen or fail to improve. An  electronic signature was used to authenticate this note. --JUNIOR Carter on 10/14/2021 at 1:41 PM        Pursuant to the emergency declaration under the [address] [place] [address], [facility] waiver authority and the [employer] Act, this Virtual  Visit was conducted, with patient's consent, to reduce the patient's risk of exposure to COVID-19 and provide continuity of care for an established patient. Services were provided through a video synchronous discussion virtually to substitute for in-person clinic visit.

## 2024-04-03 ENCOUNTER — HOSPITAL ENCOUNTER (EMERGENCY)
Facility: HOSPITAL | Age: 29
Discharge: HOME | End: 2024-04-03
Attending: EMERGENCY MEDICINE

## 2024-04-03 ENCOUNTER — APPOINTMENT (OUTPATIENT)
Dept: RADIOLOGY | Facility: HOSPITAL | Age: 29
End: 2024-04-03

## 2024-04-03 VITALS
HEIGHT: 69 IN | RESPIRATION RATE: 16 BRPM | TEMPERATURE: 97.5 F | BODY MASS INDEX: 33.33 KG/M2 | WEIGHT: 225 LBS | OXYGEN SATURATION: 98 % | HEART RATE: 85 BPM | SYSTOLIC BLOOD PRESSURE: 144 MMHG | DIASTOLIC BLOOD PRESSURE: 88 MMHG

## 2024-04-03 DIAGNOSIS — R19.7 VOMITING AND DIARRHEA: Primary | ICD-10-CM

## 2024-04-03 DIAGNOSIS — R11.10 VOMITING AND DIARRHEA: Primary | ICD-10-CM

## 2024-04-03 LAB
ALBUMIN SERPL BCP-MCNC: 4.2 G/DL (ref 3.4–5)
ALP SERPL-CCNC: 58 U/L (ref 33–120)
ALT SERPL W P-5'-P-CCNC: 35 U/L (ref 10–52)
ANION GAP SERPL CALC-SCNC: 12 MMOL/L (ref 10–20)
AST SERPL W P-5'-P-CCNC: 22 U/L (ref 9–39)
BASOPHILS # BLD AUTO: 0.04 X10*3/UL (ref 0–0.1)
BASOPHILS NFR BLD AUTO: 0.4 %
BILIRUB DIRECT SERPL-MCNC: 0 MG/DL (ref 0–0.3)
BILIRUB SERPL-MCNC: 0.2 MG/DL (ref 0–1.2)
BUN SERPL-MCNC: 9 MG/DL (ref 6–23)
CALCIUM SERPL-MCNC: 8.6 MG/DL (ref 8.6–10.3)
CHLORIDE SERPL-SCNC: 107 MMOL/L (ref 98–107)
CO2 SERPL-SCNC: 24 MMOL/L (ref 21–32)
CREAT SERPL-MCNC: 0.92 MG/DL (ref 0.5–1.3)
EGFRCR SERPLBLD CKD-EPI 2021: >90 ML/MIN/1.73M*2
EOSINOPHIL # BLD AUTO: 0.56 X10*3/UL (ref 0–0.7)
EOSINOPHIL NFR BLD AUTO: 6.1 %
ERYTHROCYTE [DISTWIDTH] IN BLOOD BY AUTOMATED COUNT: 12 % (ref 11.5–14.5)
FLUAV RNA RESP QL NAA+PROBE: NOT DETECTED
FLUBV RNA RESP QL NAA+PROBE: NOT DETECTED
GLUCOSE SERPL-MCNC: 138 MG/DL (ref 74–99)
HCT VFR BLD AUTO: 47.3 % (ref 41–52)
HGB BLD-MCNC: 16.7 G/DL (ref 13.5–17.5)
IMM GRANULOCYTES # BLD AUTO: 0.02 X10*3/UL (ref 0–0.7)
IMM GRANULOCYTES NFR BLD AUTO: 0.2 % (ref 0–0.9)
LACTATE SERPL-SCNC: 1.8 MMOL/L (ref 0.4–2)
LIPASE SERPL-CCNC: 30 U/L (ref 9–82)
LYMPHOCYTES # BLD AUTO: 3.82 X10*3/UL (ref 1.2–4.8)
LYMPHOCYTES NFR BLD AUTO: 41.5 %
MAGNESIUM SERPL-MCNC: 1.56 MG/DL (ref 1.6–2.4)
MCH RBC QN AUTO: 30.4 PG (ref 26–34)
MCHC RBC AUTO-ENTMCNC: 35.3 G/DL (ref 32–36)
MCV RBC AUTO: 86 FL (ref 80–100)
MONOCYTES # BLD AUTO: 0.56 X10*3/UL (ref 0.1–1)
MONOCYTES NFR BLD AUTO: 6.1 %
NEUTROPHILS # BLD AUTO: 4.2 X10*3/UL (ref 1.2–7.7)
NEUTROPHILS NFR BLD AUTO: 45.7 %
NRBC BLD-RTO: 0 /100 WBCS (ref 0–0)
PLATELET # BLD AUTO: 178 X10*3/UL (ref 150–450)
POTASSIUM SERPL-SCNC: 3.8 MMOL/L (ref 3.5–5.3)
PROT SERPL-MCNC: 6.5 G/DL (ref 6.4–8.2)
RBC # BLD AUTO: 5.49 X10*6/UL (ref 4.5–5.9)
S PYO DNA THROAT QL NAA+PROBE: NOT DETECTED
SARS-COV-2 RNA RESP QL NAA+PROBE: NOT DETECTED
SODIUM SERPL-SCNC: 139 MMOL/L (ref 136–145)
WBC # BLD AUTO: 9.2 X10*3/UL (ref 4.4–11.3)

## 2024-04-03 PROCEDURE — 83690 ASSAY OF LIPASE: CPT | Performed by: EMERGENCY MEDICINE

## 2024-04-03 PROCEDURE — 87636 SARSCOV2 & INF A&B AMP PRB: CPT | Performed by: EMERGENCY MEDICINE

## 2024-04-03 PROCEDURE — 96360 HYDRATION IV INFUSION INIT: CPT

## 2024-04-03 PROCEDURE — 85025 COMPLETE CBC W/AUTO DIFF WBC: CPT | Performed by: EMERGENCY MEDICINE

## 2024-04-03 PROCEDURE — 99284 EMERGENCY DEPT VISIT MOD MDM: CPT | Mod: 25

## 2024-04-03 PROCEDURE — 83735 ASSAY OF MAGNESIUM: CPT | Performed by: EMERGENCY MEDICINE

## 2024-04-03 PROCEDURE — 83605 ASSAY OF LACTIC ACID: CPT | Performed by: EMERGENCY MEDICINE

## 2024-04-03 PROCEDURE — 36415 COLL VENOUS BLD VENIPUNCTURE: CPT | Performed by: EMERGENCY MEDICINE

## 2024-04-03 PROCEDURE — 87651 STREP A DNA AMP PROBE: CPT | Performed by: EMERGENCY MEDICINE

## 2024-04-03 PROCEDURE — 74176 CT ABD & PELVIS W/O CONTRAST: CPT

## 2024-04-03 PROCEDURE — 82248 BILIRUBIN DIRECT: CPT | Performed by: EMERGENCY MEDICINE

## 2024-04-03 PROCEDURE — 2500000004 HC RX 250 GENERAL PHARMACY W/ HCPCS (ALT 636 FOR OP/ED): Performed by: EMERGENCY MEDICINE

## 2024-04-03 PROCEDURE — 74176 CT ABD & PELVIS W/O CONTRAST: CPT | Performed by: RADIOLOGY

## 2024-04-03 RX ORDER — ONDANSETRON 4 MG/1
4 TABLET, ORALLY DISINTEGRATING ORAL EVERY 8 HOURS PRN
Qty: 15 TABLET | Refills: 0 | Status: SHIPPED | OUTPATIENT
Start: 2024-04-03 | End: 2024-04-08

## 2024-04-03 RX ORDER — ONDANSETRON HYDROCHLORIDE 2 MG/ML
4 INJECTION, SOLUTION INTRAVENOUS ONCE
Status: COMPLETED | OUTPATIENT
Start: 2024-04-03 | End: 2024-04-03

## 2024-04-03 RX ADMIN — SODIUM CHLORIDE 1000 ML: 9 INJECTION, SOLUTION INTRAVENOUS at 08:01

## 2024-04-03 RX ADMIN — ONDANSETRON 4 MG: 2 INJECTION INTRAMUSCULAR; INTRAVENOUS at 08:00

## 2024-04-03 ASSESSMENT — COLUMBIA-SUICIDE SEVERITY RATING SCALE - C-SSRS
6. HAVE YOU EVER DONE ANYTHING, STARTED TO DO ANYTHING, OR PREPARED TO DO ANYTHING TO END YOUR LIFE?: NO
1. IN THE PAST MONTH, HAVE YOU WISHED YOU WERE DEAD OR WISHED YOU COULD GO TO SLEEP AND NOT WAKE UP?: NO
2. HAVE YOU ACTUALLY HAD ANY THOUGHTS OF KILLING YOURSELF?: NO

## 2024-04-03 ASSESSMENT — PAIN DESCRIPTION - LOCATION: LOCATION: ABDOMEN

## 2024-04-03 ASSESSMENT — PAIN - FUNCTIONAL ASSESSMENT: PAIN_FUNCTIONAL_ASSESSMENT: 0-10

## 2024-04-03 ASSESSMENT — LIFESTYLE VARIABLES
EVER FELT BAD OR GUILTY ABOUT YOUR DRINKING: NO
EVER HAD A DRINK FIRST THING IN THE MORNING TO STEADY YOUR NERVES TO GET RID OF A HANGOVER: NO
HAVE YOU EVER FELT YOU SHOULD CUT DOWN ON YOUR DRINKING: NO
HAVE PEOPLE ANNOYED YOU BY CRITICIZING YOUR DRINKING: NO
TOTAL SCORE: 0

## 2024-04-03 ASSESSMENT — PAIN SCALES - GENERAL
PAINLEVEL_OUTOF10: 0 - NO PAIN
PAINLEVEL_OUTOF10: 4

## 2024-04-03 ASSESSMENT — PAIN DESCRIPTION - PAIN TYPE: TYPE: ACUTE PAIN

## 2024-04-03 ASSESSMENT — PAIN DESCRIPTION - ORIENTATION: ORIENTATION: LOWER;RIGHT;LEFT;UPPER

## 2024-04-03 NOTE — Clinical Note
Jono Lott was seen and treated in our emergency department on 4/3/2024.  He may return to work on 04/04/2024.       If you have any questions or concerns, please don't hesitate to call.      Lili Langford MD

## 2024-04-03 NOTE — ED PROVIDER NOTES
HPI   Chief Complaint   Patient presents with    Vomiting     Vomiting since Monday +n/v/d +abdominal pain generalized       HPI: 28-year-old male no significant past medical history except GERD states that he has been having nausea vomiting and diarrhea for 2 days.  No fevers or chills.  Diffuse crampy abdominal pain.  No blood in his urine blood in his stools or dark tarry stools.  No fevers.  No chills.  No runny nose sore throat or cough.  No blood thinners  Family HX: Denies any significant/pertinent family history.  Social Hx: Denies ETOH or drug use.  Review of systems:  Gen.: No weight loss, fatigue, anorexia, insomnia, fever.   Eyes: No vision loss, double vision, drainage, eye pain.   ENT: No pharyngitis, dry mouth.   Cardiac: No chest pain, palpitations, syncope, near syncope.   Pulmonary: No shortness of breath, cough, hemoptysis.   Heme/lymph: No swollen glands, fever, bleeding.   GI: No  change in bowel habits, melena, hematemesis, hematochezia  : No discharge, dysuria, frequency, urgency, hematuria.   Musculoskeletal: No limb pain, joint pain, joint swelling.   Skin: No rashes.   Psych: No depression, anxiety, suicidality, homicidality.   Review of systems is otherwise negative unless stated above or in history of present illness.    Physical Exam:    Appearance: Alert, oriented , cooperative,  in no acute distress. Well nourished & well hydrated.    Skin: Intact,  dry skin, no lesions, rash, petechiae or purpura.     Eyes: PERRLA, EOMs intact,  Conjunctiva pink with no redness or exudates. Eyelids without lesions. No scleral icterus.     ENT: Hearing grossly intact. External auditory canals patent, tympanic membranes intact with visible landmarks. Nares patent, mucus membranes moist. Dentition without lesions. Pharynx clear, uvula midline.     Neck: Supple, without meningismus. Thyroid not palpable. Trachea at midline. No lymphadenopathy.    Pulmonary: Clear bilaterally with good chest wall  excursion. No rales, rhonchi or wheezing. No accessory muscle use or stridor.    Cardiac: Normal S1, S2 without murmur, rub, gallop or extrasystole. No JVD, Carotids without bruits.    Abdomen: Soft, nontender, active bowel sounds.  No palpable organomegaly.  No rebound or guarding.  No CVA tenderness.  No point tenderness to the abdomen.  No rebound or guarding    Genitourinary: Exam deferred.    Musculoskeletal: Full range of motion. no pain, edema, or deformity. Pulses full and equal. No cyanosis, clubbing, or edema.    Neurological:  Cranial nerves II through XII are grossly intact, finger-nose touch is normal, normal sensation, no weakness, no focal findings identified.    Psychiatric: Appropriate mood and affect.     Medical Decision-Making:  Testing: Labs are reviewed.  Patient was given IV Zofran.  Labs are reviewed.  CT of the pelvis was read by radiology as unremarkable.  I reevaluation patient is tolerating p.o.  He does not have any point tenderness to the abdomen.  He should drink clear liquids and advance slowly.  Return for worsening symptoms, blood in his urine blood in stools dark tarry stools decreased urine output or any other concerns treatment:   Reevaluation:   Plan: Homegoing. Discussed differential. Will follow-up with the primary physician in the next 2-3 days. Return if worse. They understand return precautions and discharge instructions. Patient and family/friend/caregiver are in agreement with this plan.   Impression:   1.  Vomiting diarrhea  2.    Labs Reviewed  MAGNESIUM - Abnormal     Magnesium                     1.56 (*)            BASIC METABOLIC PANEL - Abnormal     Glucose                       138 (*)                Sodium                        139                    Potassium                     3.8                    Chloride                      107                    Bicarbonate                   24                     Anion Gap                     12                      Urea Nitrogen                 9                      Creatinine                    0.92                   eGFR                          >90                    Calcium                       8.6                 GROUP A STREPTOCOCCUS, PCR - Normal     Group A Strep PCR                                 LIPASE - Normal     Lipase                        30                       Narrative: Venipuncture immediately after or during the administration of Metamizole may lead to falsely low results. Testing should be performed immediately prior to Metamizole dosing.  LACTATE - Normal     Lactate                       1.8                      Narrative: Venipuncture immediately after or during the administration of Metamizole may lead to falsely low results. Testing should be performed immediately                prior to Metamizole dosing.  HEPATIC FUNCTION PANEL - Normal     Albumin                       4.2                    Bilirubin, Total              0.2                    Bilirubin, Direct             0.0                    Alkaline Phosphatase          58                     ALT                           35                     AST                           22                     Total Protein                 6.5                 INFLUENZA A AND B PCR - Normal     Flu A Result                                         Flu B Result                                           Narrative: This assay is an in vitro diagnostic multiplex nucleic acid amplification test for the detection and discrimination of Influenza A & B from nasopharyngeal specimens, and has been validated for use at [facility]. Negative results do not preclude Influenza A/B infections, and should not be used as the sole basis for diagnosis, treatment, or other management decisions. If Influenza A/B and RSV PCR results are negative, testing for Parainfluenza virus, Adenovirus and Metapneumovirus is routinely performed for [facility] pediatric  oncology and intensive care inpatients, and is available on other patients by placing an add-on request.  SARS-COV-2 PCR - Normal     Coronavirus 2019, PCR                                  Narrative: This assay has received FDA Emergency Use Authorization (EUA) and is only authorized for the duration of time that circumstances exist to justify the authorization of the emergency use of in vitro diagnostic tests for the detection of SARS-CoV-2 virus and/or diagnosis of COVID-19 infection under section 564(b)(1) of the Act, 21 U.S.C. 360bbb-3(b)(1). This assay is an in vitro diagnostic nucleic acid amplification test for the qualitative detection of SARS-CoV-2 from nasopharyngeal specimens and has been validated for use at [facility]. Negative results do not preclude COVID-19 infections and should not be used as the sole basis for diagnosis, treatment, or other management decisions.                  CBC WITH AUTO DIFFERENTIAL     CT abdomen pelvis wo IV contrast   Final Result    No acute finding in the pelvis.          Signed by: Mitchel Rhodes 4/3/2024 8:43 AM    Dictation workstation:   [id number]                                [place] Coma Scale Score: 15                     Patient History   Past Medical History:   Diagnosis Date    Asthma     Personal history of other diseases of the respiratory system 09/03/2021    History of asthma     History reviewed. No pertinent surgical history.  No family history on file.  Social History     Tobacco Use    Smoking status: Not on file    Smokeless tobacco: Not on file   Substance Use Topics    Alcohol use: Not on file    Drug use: Not on file       Physical Exam   ED Triage Vitals [04/03/24 0744]   Temperature Heart Rate Respirations BP   36.4 °C (97.5 °F) 93 18 (!) 169/98      Pulse Ox Temp Source Heart Rate Source Patient Position   97 % Temporal Monitor Sitting      BP Location FiO2 (%)     Right arm --       Physical Exam    ED Course & MDM    Diagnoses as of 04/03/24 1330   Vomiting and diarrhea       Medical Decision Making      Procedure  Procedures     Lili Langford MD  04/03/24 7644

## 2024-05-13 ENCOUNTER — APPOINTMENT (OUTPATIENT)
Dept: CT IMAGING | Age: 29
End: 2024-05-13

## 2024-05-13 ENCOUNTER — HOSPITAL ENCOUNTER (EMERGENCY)
Age: 29
Discharge: HOME | End: 2024-05-14
Attending: STUDENT IN AN ORGANIZED HEALTH CARE EDUCATION/TRAINING PROGRAM

## 2024-05-13 DIAGNOSIS — M54.31 SCIATICA OF RIGHT SIDE: ICD-10-CM

## 2024-05-13 DIAGNOSIS — S39.012A STRAIN OF LUMBAR REGION, INITIAL ENCOUNTER: Primary | ICD-10-CM

## 2024-05-13 PROCEDURE — 72131 CT LUMBAR SPINE W/O DYE: CPT

## 2024-05-13 PROCEDURE — 72128 CT CHEST SPINE W/O DYE: CPT

## 2024-05-13 PROCEDURE — 99284 EMERGENCY DEPT VISIT MOD MDM: CPT

## 2024-05-13 NOTE — ED TRIAGE NOTES
Last Wednesday 1200 lb Tool box fell on him states he dislocated his right shoulder and a family member put it back in place for him but his lower back and mid have been hurting him since and it is worse with ambulation. Pain also travels into right leg when ambulating, Denies any incontinence.

## 2024-05-14 ENCOUNTER — APPOINTMENT (OUTPATIENT)
Dept: GENERAL RADIOLOGY | Age: 29
End: 2024-05-14

## 2024-05-14 VITALS
SYSTOLIC BLOOD PRESSURE: 132 MMHG | RESPIRATION RATE: 16 BRPM | OXYGEN SATURATION: 98 % | TEMPERATURE: 98.4 F | DIASTOLIC BLOOD PRESSURE: 59 MMHG | HEIGHT: 69 IN | HEART RATE: 64 BPM | BODY MASS INDEX: 32.58 KG/M2 | WEIGHT: 220 LBS

## 2024-05-14 PROCEDURE — 6370000000 HC RX 637 (ALT 250 FOR IP): Performed by: PHYSICIAN ASSISTANT

## 2024-05-14 PROCEDURE — 73502 X-RAY EXAM HIP UNI 2-3 VIEWS: CPT

## 2024-05-14 RX ORDER — ACETAMINOPHEN 325 MG/1
650 TABLET ORAL EVERY 6 HOURS PRN
Qty: 120 TABLET | Refills: 3 | Status: SHIPPED | OUTPATIENT
Start: 2024-05-14 | End: 2024-07-13

## 2024-05-14 RX ORDER — OXYCODONE HYDROCHLORIDE AND ACETAMINOPHEN 5; 325 MG/1; MG/1
1 TABLET ORAL ONCE
Status: COMPLETED | OUTPATIENT
Start: 2024-05-14 | End: 2024-05-14

## 2024-05-14 RX ORDER — METHOCARBAMOL 1000 MG/1
1000 TABLET, COATED ORAL 3 TIMES DAILY PRN
Qty: 30 TABLET | Refills: 0 | Status: SHIPPED | OUTPATIENT
Start: 2024-05-14 | End: 2024-05-24

## 2024-05-14 RX ORDER — OXYCODONE HYDROCHLORIDE 5 MG/1
5 TABLET ORAL EVERY 6 HOURS PRN
Qty: 10 TABLET | Refills: 0 | Status: SHIPPED | OUTPATIENT
Start: 2024-05-14 | End: 2024-05-17

## 2024-05-14 RX ADMIN — OXYCODONE AND ACETAMINOPHEN 1 TABLET: 5; 325 TABLET ORAL at 00:30

## 2024-05-14 ASSESSMENT — ENCOUNTER SYMPTOMS
COLOR CHANGE: 0
NAUSEA: 0
SHORTNESS OF BREATH: 0
VOMITING: 0
BACK PAIN: 1
ABDOMINAL PAIN: 0

## 2024-05-14 ASSESSMENT — PAIN SCALES - GENERAL
PAINLEVEL_OUTOF10: 7
PAINLEVEL_OUTOF10: 3

## 2024-05-14 ASSESSMENT — PAIN DESCRIPTION - ORIENTATION
ORIENTATION: LOWER
ORIENTATION: LOWER

## 2024-05-14 ASSESSMENT — PAIN DESCRIPTION - DESCRIPTORS
DESCRIPTORS: SHARP
DESCRIPTORS: ACHING

## 2024-05-14 ASSESSMENT — PAIN DESCRIPTION - LOCATION
LOCATION: BACK
LOCATION: BACK

## 2024-05-14 NOTE — ED PROVIDER NOTES
[facility] ED  EMERGENCY DEPARTMENT ENCOUNTER      Pt Name: Aung Marshall  MRN: [id number]  Birthdate 1995  Date of evaluation: 5/13/2024  Provider: Carolyn Guillermo MD    CHIEF COMPLAINT       Chief Complaint   Patient presents with    Back Pain         HISTORY OF PRESENT ILLNESS   (Location/Symptom, Timing/Onset, Context/Setting, Quality, Duration, Modifying Factors, Severity)  Note limiting factors.   Aung Marshall is a 28 y.o. male who presents to the emergency department with right hip pain.      HPI  28-year-old male with past medical history notable for asthma who presents to the emergency department for evaluation of right lower back and right-sided hip pain which occurred 5 days ago.  Reports that he was moving a 1200 pound toolbox with his family member, out of a [employer] truck, when it fell and landed on his right hip/lower back.  Reports constant pain since that time that worsens with ambulation.  States this is \"7 out of 10\" in severity.  Has been taking Tylenol at home without any relief.  Reports occasional numbness going down the back of his right leg but denies any saddle anesthesia, urinary retention, stool incontinence, or other symptoms.  Reports that he also dislocated his right shoulder but his sister put it back into place and it feels fine at this time.    Nursing Notes were reviewed.    REVIEW OF SYSTEMS    (2-9 systems for level 4, 10 or more for level 5)     Review of Systems   Constitutional:  Negative for chills and fever.   Respiratory:  Negative for shortness of breath.    Cardiovascular:  Negative for chest pain.   Gastrointestinal:  Negative for abdominal pain, nausea and vomiting.   Genitourinary:  Negative for difficulty urinating.   Musculoskeletal:  Positive for arthralgias and back pain. Negative for neck pain and neck stiffness.   Skin:  Negative for color change and wound.   Neurological:  Positive for numbness. Negative for weakness.   Psychiatric/Behavioral:

## 2024-05-14 NOTE — ED PROVIDER NOTES
Basic Information   Time Seen: 8:07 PM   Primary Care Provider: No primary care provider on file.     Chief Complaint   Patient presents with    Back Pain      HPI   Aung Marshall is a 28 yrs male whom per chart review has a PMHx of asthma presents to ED for evaluation of back pain.  Patient reports that last Wednesday, 0/08/2024 he was getting a 1200 pound toolbox out of the back of his truck and patient reports that it fell striking him in his back.  Patient does report that he also dislocated his R shoulder at that time, however reports that his family member was able to reduce his shoulder and patient has no complaints of pain to his R shoulder at this time.  Patient states that pain to his back is located in the mid to lower portion.  Patient does report that pain, numbness does extend down his R leg.  Patient reports that he has been taking OTC Tylenol with minimal improvement in symptoms.  Patient reports that he has been able to ambulate, however does report increased discomfort with ambulation.  Patient verbalizes no additional complaints.  Patient denies chest pain, shortness of breath, N/V/D, fever, chills, hematuria, dysuria, urinary frequency/urgency, saddle anesthesias, incontinence of bowel or bladder, numbness/tingling/weakness of LLE, tingling/weakness of RLE.   Physical Exam     /73 (05/13/24 1954)    Temp 98.4 °F (36.9 °C) (05/13/24 1954)    Pulse 94 (05/13/24 1954)   Resp 18 (05/13/24 1954)    SpO2 96 % (05/13/24 1954)       General: Awake and Alert, no acute distress   CV: RRR, S1, S2   Resp: LCTAB, even and non labored   Other: + tenderness on palpation of midline t-spine, l-spine. No bony step-off, deformity.   Impression and Plan   Labs Reviewed - No data to display     CT THORACIC SPINE WO CONTRAST    (Results Pending)   CT LUMBAR SPINE WO CONTRAST    (Results Pending)      Final Impression   I have performed a medical screening exam on Aung Marshall. Based on this patient's chief

## 2024-05-14 NOTE — DISCHARGE INSTRUCTIONS
You were seen and evaluated in the emergency department.  CT imaging of your thoracic spine and lumbar spine did not show any traumatic injuries.  X-ray imaging of your right hip did not show any fractures or dislocations.  Your examination was otherwise reassuring as there were no neurologic deficits.    Plan:  Take Tylenol 650 mg every 6 hours  Apply ice or heat to the affected area for 20 minutes at a time every 1-2 hours  Take methocarbamol 1000 mg every 8 hours as needed for back spasm but do not drive or operate heavy machinery while using this medicine as it can cause sedation  Take oxycodone as needed for breakthrough pain 5 mg every 6-8 hours but do not drive or operate heavy machinery while using this medication as it can cause sedation

## 2024-06-24 ENCOUNTER — HOSPITAL ENCOUNTER (EMERGENCY)
Facility: HOSPITAL | Age: 29
Discharge: HOME | End: 2024-06-24
Payer: COMMERCIAL

## 2024-06-24 VITALS
WEIGHT: 228 LBS | HEART RATE: 89 BPM | TEMPERATURE: 98.1 F | HEIGHT: 68 IN | DIASTOLIC BLOOD PRESSURE: 96 MMHG | BODY MASS INDEX: 34.56 KG/M2 | OXYGEN SATURATION: 96 % | RESPIRATION RATE: 18 BRPM | SYSTOLIC BLOOD PRESSURE: 152 MMHG

## 2024-06-24 DIAGNOSIS — M79.672 LEFT FOOT PAIN: Primary | ICD-10-CM

## 2024-06-24 PROCEDURE — 99283 EMERGENCY DEPT VISIT LOW MDM: CPT

## 2024-06-24 RX ORDER — HYDROCODONE BITARTRATE AND ACETAMINOPHEN 5; 325 MG/1; MG/1
1 TABLET ORAL EVERY 4 HOURS PRN
Qty: 6 TABLET | Refills: 0 | Status: SHIPPED | OUTPATIENT
Start: 2024-06-24 | End: 2024-06-26

## 2024-06-24 RX ORDER — PREDNISONE 20 MG/1
TABLET ORAL
Qty: 30 TABLET | Refills: 0 | Status: SHIPPED | OUTPATIENT
Start: 2024-06-24

## 2024-06-24 ASSESSMENT — LIFESTYLE VARIABLES
HAVE YOU EVER FELT YOU SHOULD CUT DOWN ON YOUR DRINKING: NO
EVER FELT BAD OR GUILTY ABOUT YOUR DRINKING: NO
TOTAL SCORE: 0
EVER HAD A DRINK FIRST THING IN THE MORNING TO STEADY YOUR NERVES TO GET RID OF A HANGOVER: NO
HAVE PEOPLE ANNOYED YOU BY CRITICIZING YOUR DRINKING: NO

## 2024-06-24 ASSESSMENT — COLUMBIA-SUICIDE SEVERITY RATING SCALE - C-SSRS
2. HAVE YOU ACTUALLY HAD ANY THOUGHTS OF KILLING YOURSELF?: NO
1. IN THE PAST MONTH, HAVE YOU WISHED YOU WERE DEAD OR WISHED YOU COULD GO TO SLEEP AND NOT WAKE UP?: NO
6. HAVE YOU EVER DONE ANYTHING, STARTED TO DO ANYTHING, OR PREPARED TO DO ANYTHING TO END YOUR LIFE?: NO

## 2024-06-24 ASSESSMENT — PAIN SCALES - GENERAL: PAINLEVEL_OUTOF10: 5 - MODERATE PAIN

## 2024-06-24 ASSESSMENT — PAIN DESCRIPTION - LOCATION: LOCATION: FOOT

## 2024-06-24 ASSESSMENT — PAIN - FUNCTIONAL ASSESSMENT: PAIN_FUNCTIONAL_ASSESSMENT: 0-10

## 2024-06-24 NOTE — ED PROVIDER NOTES
HPI   Chief Complaint   Patient presents with    Foot Injury     L foot pain, swelling, no injury - has happened 3 times         History provided by:  Patient   used: No      28-year-old male presents with left foot pain and swelling without injury that started yesterday.  He states that he has had multiple episodes of this and he has not followed up with the family doctor.  First episode was years ago.  He has been checked out multiple times at ERs and has had x-rays with no findings.  Patient comes in wearing a walking boot.  Denies calf pain.  Denies temp or sensation changes    ROS negative unless otherwise stated in HPI                    No data recorded                   Patient History   Past Medical History:   Diagnosis Date    Asthma ([facility])     Personal history of other diseases of the respiratory system 09/03/2021    History of asthma     History reviewed. No pertinent surgical history.  No family history on file.  Social History     Tobacco Use    Smoking status: Not on file    Smokeless tobacco: Not on file   Substance Use Topics    Alcohol use: Not on file    Drug use: Not on file       Physical Exam   ED Triage Vitals [06/24/24 1125]   Temperature Heart Rate Respirations BP   36.7 °C (98.1 °F) 89 18 (!) 152/96      Pulse Ox Temp Source Heart Rate Source Patient Position   96 % Temporal -- Sitting      BP Location FiO2 (%)     Right arm --       Physical Exam    General: alert, no distress, well nourished, talking in full and complete sentences  Skin: dry, intact, no rashes  Head: atraumatic  Eyes: EOMI, PERRLA, normal conjunctiva  Throat: no stridor, no hot potato voice  Nose: nares patent  Mouth: mucous membranes moist  Respiratory: non labored breathing  Extremities: Tender to left foot throughout, no tenderness to calf, FROM X4, strength +5/5, pulses intact, capillary refill intact  Neuro: A&Ox3  Psych: cooperative, appropriate mood      ED Course & MDM   Diagnoses as of  06/24/24 1226   Left foot pain       Medical Decision Making  I do not believe patient needs imaging as he has not had injury.  He gets the same exact symptoms intermittently and has been ongoing for years now and possibly an inflammatory condition such as gout.  He will be placed on a prednisone taper and given Norco.  Denies history of drug abuse.  [place]AR[place] reviewed and no concerns.  He is given a referral to a family doctor.  Afebrile, not tachycardic, not tachypneic, not hypoxic, tolerating PO, non toxic appearing and ambulating at baseline at discharge. Hemodynamically intact. Patient instructed on return precautions. All questions answered. Educated on SE of meds. Patient in agreement with treatment.      Procedure  Procedures     Mikki Lizama PA-C  06/24/24 1207       Mikki Lizama PA-C  06/24/24 1226

## 2024-06-26 ENCOUNTER — OFFICE VISIT (OUTPATIENT)
Dept: PRIMARY CARE | Facility: CLINIC | Age: 29
End: 2024-06-26
Payer: COMMERCIAL

## 2024-06-26 VITALS
HEIGHT: 68 IN | BODY MASS INDEX: 33.95 KG/M2 | HEART RATE: 71 BPM | SYSTOLIC BLOOD PRESSURE: 138 MMHG | DIASTOLIC BLOOD PRESSURE: 84 MMHG | OXYGEN SATURATION: 93 % | WEIGHT: 224 LBS

## 2024-06-26 DIAGNOSIS — Z72.0 NICOTINE USE: ICD-10-CM

## 2024-06-26 DIAGNOSIS — F41.9 ANXIETY AND DEPRESSION: ICD-10-CM

## 2024-06-26 DIAGNOSIS — J45.30 MILD PERSISTENT ASTHMA WITHOUT COMPLICATION (HHS-HCC): ICD-10-CM

## 2024-06-26 DIAGNOSIS — F90.9 ATTENTION DEFICIT HYPERACTIVITY DISORDER (ADHD), UNSPECIFIED ADHD TYPE: ICD-10-CM

## 2024-06-26 DIAGNOSIS — Z13.9 SCREENING FOR CONDITION: ICD-10-CM

## 2024-06-26 DIAGNOSIS — M25.572 ACUTE LEFT ANKLE PAIN: ICD-10-CM

## 2024-06-26 DIAGNOSIS — Z00.00 PREVENTATIVE HEALTH CARE: Primary | ICD-10-CM

## 2024-06-26 DIAGNOSIS — Z11.59 NEED FOR HEPATITIS C SCREENING TEST: ICD-10-CM

## 2024-06-26 DIAGNOSIS — F32.A ANXIETY AND DEPRESSION: ICD-10-CM

## 2024-06-26 DIAGNOSIS — E55.9 VITAMIN D DEFICIENCY: ICD-10-CM

## 2024-06-26 DIAGNOSIS — Z79.899 DRUG THERAPY: ICD-10-CM

## 2024-06-26 PROBLEM — J45.909 ASTHMA (HHS-HCC): Status: ACTIVE | Noted: 2024-06-26

## 2024-06-26 PROBLEM — R11.10 VOMITING AND DIARRHEA: Status: RESOLVED | Noted: 2024-06-26 | Resolved: 2024-06-26

## 2024-06-26 PROBLEM — I10 PRIMARY HYPERTENSION: Status: ACTIVE | Noted: 2023-08-05

## 2024-06-26 PROBLEM — R19.7 VOMITING AND DIARRHEA: Status: RESOLVED | Noted: 2024-06-26 | Resolved: 2024-06-26

## 2024-06-26 PROBLEM — R06.89 DIFFICULTY BREATHING: Status: RESOLVED | Noted: 2024-06-26 | Resolved: 2024-06-26

## 2024-06-26 PROBLEM — U07.1 DISEASE DUE TO SEVERE ACUTE RESPIRATORY SYNDROME CORONAVIRUS 2 (SARS-COV-2): Status: RESOLVED | Noted: 2024-06-26 | Resolved: 2024-06-26

## 2024-06-26 PROBLEM — B49 INFECTION DUE TO FUNGUS: Status: RESOLVED | Noted: 2024-06-26 | Resolved: 2024-06-26

## 2024-06-26 PROBLEM — M54.42 CHRONIC BILATERAL LOW BACK PAIN WITH BILATERAL SCIATICA: Status: ACTIVE | Noted: 2023-08-05

## 2024-06-26 PROBLEM — M79.673 FOOT PAIN: Status: ACTIVE | Noted: 2024-06-26

## 2024-06-26 PROBLEM — G89.29 CHRONIC BILATERAL LOW BACK PAIN WITH BILATERAL SCIATICA: Status: ACTIVE | Noted: 2023-08-05

## 2024-06-26 PROBLEM — M54.41 CHRONIC BILATERAL LOW BACK PAIN WITH BILATERAL SCIATICA: Status: ACTIVE | Noted: 2023-08-05

## 2024-06-26 PROBLEM — M25.473 PAIN AND SWELLING OF ANKLE: Status: ACTIVE | Noted: 2024-06-26

## 2024-06-26 PROBLEM — M77.10 LATERAL EPICONDYLITIS: Status: RESOLVED | Noted: 2024-06-26 | Resolved: 2024-06-26

## 2024-06-26 PROBLEM — K21.9 GASTROESOPHAGEAL REFLUX DISEASE WITHOUT ESOPHAGITIS: Status: ACTIVE | Noted: 2023-08-05

## 2024-06-26 PROBLEM — B49 FUNGAL INFECTION: Status: RESOLVED | Noted: 2024-06-26 | Resolved: 2024-06-26

## 2024-06-26 PROBLEM — M79.89 FOOT SWELLING: Status: ACTIVE | Noted: 2024-06-26

## 2024-06-26 PROBLEM — J45.40 MODERATE PERSISTENT ASTHMA WITHOUT COMPLICATION (HHS-HCC): Status: ACTIVE | Noted: 2023-08-05

## 2024-06-26 PROBLEM — S93.609A FOOT SPRAIN: Status: RESOLVED | Noted: 2024-06-26 | Resolved: 2024-06-26

## 2024-06-26 PROBLEM — R51.9 HEADACHE: Status: RESOLVED | Noted: 2024-06-26 | Resolved: 2024-06-26

## 2024-06-26 PROBLEM — M25.579 PAIN AND SWELLING OF ANKLE: Status: ACTIVE | Noted: 2024-06-26

## 2024-06-26 PROCEDURE — 4004F PT TOBACCO SCREEN RCVD TLK: CPT | Performed by: FAMILY MEDICINE

## 2024-06-26 PROCEDURE — 3075F SYST BP GE 130 - 139MM HG: CPT | Performed by: FAMILY MEDICINE

## 2024-06-26 PROCEDURE — 3079F DIAST BP 80-89 MM HG: CPT | Performed by: FAMILY MEDICINE

## 2024-06-26 PROCEDURE — 99215 OFFICE O/P EST HI 40 MIN: CPT | Performed by: FAMILY MEDICINE

## 2024-06-26 PROCEDURE — 99395 PREV VISIT EST AGE 18-39: CPT | Performed by: FAMILY MEDICINE

## 2024-06-26 RX ORDER — BUPROPION HYDROCHLORIDE 150 MG/1
TABLET ORAL
Qty: 60 TABLET | Refills: 0 | Status: SHIPPED | OUTPATIENT
Start: 2024-06-26

## 2024-06-26 NOTE — PATIENT INSTRUCTIONS
Patient here to establish today also discuss ankle pain post ER follow-up and will do annual preventative visit today.    -----    Immunization counseling: Most recent Tdap was March 2018.  Will be due for flu shot end of summer.  Due for latest coronavirus booster. -->>  Check with your pharmacy to keep up-to-date on immunizations.    Screening for hepatitis C: will be done with next labs.    ------    Obesity, BMI 34.  Patient notes he is lost maybe 20 pounds over the last year.  Notes he has been more busy lately, new job as a . -->>  Keep up the excellent work.      Reviewed previous labs: Previous labs reviewed, all grossly within normal limits.    Drug therapy, screening for conditions, vitamin D deficiency. -->>  recommend starting 10,000 units daily of vitamin D3.  Will check annual labs before next appointment.      left ankle pain, intermittent, has had this for years.  Previously saw orthopedics, maybe years ago.  2 days ago went to ER, treated with a 60 mg prednisone taper over 15 days.  It is helping symptoms at least a little bit already.  I do not see that uric acid never been tested. -->>  Continue your steroids as prescribed. Will plan to check uric acid with next labs.    smokes about a third of a pack a day.  Much improved from 2 packs/day.  Patient is cutting back and working on quitting. -->>  Keep up the excellent work cutting back.  Of course I advise you to completely quit.  Will prescribe Wellbutrin to help you quit smoking.    ADHD:  Previously been on stimulants meds but does not like how those make him feel. -->>  We are starting on Wellbutrin today to help with smoking cessation.  That might help at least a little bit.    Anxiety with depression, bipolar disorder: Previously had a bad reaction with a med, might have been Depakote, or possibly Paxil. -->>  Am prescribing bupropion/Wellbutrin today for smoking cessation.  This can also help mood.    Asthma. Uses albuterol  nebulizer every morning.  Otherwise not having any shortness of breath or wheezing.  Needing rescue inhaler less than once a week on average. -->>  Will send in prescription for air supra to use in place of your previous albuterol inhaler.      - Patient will return in about a month for annual lab review and follow-up.  - Will come in here about a week before to get fasting annual labs including hepatitis C screening and uric acid level.

## 2024-06-26 NOTE — PROGRESS NOTES
"Subjective   Patient ID: Jono Lott is a 28 y.o. male who presents for Establish Care (Pt in today to establish care s/p [facility]).    Review of Systems  Denies N/V/D/C, no S/V, denies rashes/lesions, no CP/SOB. Denies fevers/chills.  Positive for left ankle pain.  Positive for occasional headaches.  All other systems were negative.     Objective   /84 (BP Location: Right arm, Patient Position: Sitting)   Pulse 71   Ht 1.727 m (5' 8\")   Wt 102 kg (224 lb)   SpO2 93%   BMI 34.06 kg/m²     Physical Exam  Gen: NAD  eyes: EOMI, PERRLA  ENT: hearing grossly intact, no nasal discharge  resp: CTABL, without R/R  heart: RRR without MRG  GI: abd: S/ND/NT, BS+  lymph: no axillary, cervical, supraclavicular lymphadenopathy noted   MS: gait grossly WNL,  derm: no rashes or lesions noted  neuro: CN II-XII grossly intact  psych: A&Ox3    Assessment/Plan   Problem List Items Addressed This Visit             ICD-10-CM    Asthma ([facility]) J45.909    Relevant Medications    albuterol-budesonide (Airsupra) 90-80 mcg/actuation inhaler     Other Visit Diagnoses         Codes    Preventative health care    -  Primary Z00.00    Relevant Medications    buPROPion XL (Wellbutrin XL) 150 mg 24 hr tablet    albuterol-budesonide (Airsupra) 90-80 mcg/actuation inhaler    Other Relevant Orders    Uric acid    Hepatitis C Antibody    CBC and Auto Differential    Comprehensive Metabolic Panel    TSH with reflex to Free T4 if abnormal    Magnesium    Lipid Panel    Hemoglobin A1C    Urinalysis with Reflex Microscopic    Vitamin D 25-Hydroxy,Total (for eval of Vitamin D levels)    Nicotine use     Z72.0    Relevant Medications    buPROPion XL (Wellbutrin XL) 150 mg 24 hr tablet    Attention deficit hyperactivity disorder (ADHD), unspecified ADHD type     F90.9    Anxiety and depression     F41.9, F32.A    Relevant Medications    buPROPion XL (Wellbutrin XL) 150 mg 24 hr tablet    Drug therapy     Z79.899    Relevant Orders    CBC " and Auto Differential    Comprehensive Metabolic Panel    Magnesium    Urinalysis with Reflex Microscopic    Screening for condition     Z13.9    Relevant Orders    TSH with reflex to Free T4 if abnormal    Lipid Panel    Hemoglobin A1C    Vitamin D deficiency     E55.9    Relevant Orders    Vitamin D 25-Hydroxy,Total (for eval of Vitamin D levels)    Need for hepatitis C screening test     Z11.59    Relevant Orders    Hepatitis C Antibody    Acute left ankle pain     M25.572    Relevant Orders    Uric acid            Patient here to establish today also discuss ankle pain post ER follow-up and will do annual preventative visit today.    -----    Immunization counseling: Most recent Tdap was March 2018.  Will be due for flu shot end of summer.  Due for latest coronavirus booster. -->>  Check with your pharmacy to keep up-to-date on immunizations.    Screening for hepatitis C: will be done with next labs.    ------    Obesity, BMI 34.  Patient notes he is lost maybe 20 pounds over the last year.  Notes he has been more busy lately, new job as a . -->>  Keep up the excellent work.      Reviewed previous labs: Previous labs reviewed, all grossly within normal limits.    Drug therapy, screening for conditions, vitamin D deficiency. -->>  recommend starting 10,000 units daily of vitamin D3.  Will check annual labs before next appointment.      left ankle pain, intermittent, has had this for years.  Previously saw orthopedics, maybe years ago.  2 days ago went to ER, treated with a 60 mg prednisone taper over 15 days.  It is helping symptoms at least a little bit already.  I do not see that uric acid never been tested. -->>  Continue your steroids as prescribed. Will plan to check uric acid with next labs.  At next appointment can refer to orthopedics if it does not appear to be gout.    smokes about a third of a pack a day.  Much improved from 2 packs/day.  Patient is cutting back and working on quitting.  -->>  Keep up the excellent work cutting back.  Of course I advise you to completely quit.  Will prescribe Wellbutrin to help you quit smoking.    ADHD:  Previously been on stimulants meds but does not like how those make him feel. -->>  We are starting on Wellbutrin today to help with smoking cessation.  That might help at least a little bit.    Anxiety with depression, bipolar disorder: Previously had a bad reaction with a med, might have been Depakote, or possibly Paxil. -->>  Am prescribing bupropion/Wellbutrin today for smoking cessation.  This can also help mood.    Asthma. Uses albuterol nebulizer every morning.  Otherwise not having any shortness of breath or wheezing.  Needing rescue inhaler less than once a week on average. -->>  Will send in prescription for air supra to use in place of your previous albuterol inhaler.      - Patient will return in about a month for annual lab review and follow-up.  - Will come in here about a week before to get fasting annual labs including hepatitis C screening and uric acid level.

## 2024-07-17 ENCOUNTER — APPOINTMENT (OUTPATIENT)
Dept: PRIMARY CARE | Facility: CLINIC | Age: 29
End: 2024-07-17
Payer: COMMERCIAL

## 2024-07-24 ENCOUNTER — APPOINTMENT (OUTPATIENT)
Dept: PRIMARY CARE | Facility: CLINIC | Age: 29
End: 2024-07-24
Payer: COMMERCIAL

## 2024-08-06 ENCOUNTER — APPOINTMENT (OUTPATIENT)
Dept: RADIOLOGY | Facility: HOSPITAL | Age: 29
End: 2024-08-06

## 2024-08-06 ENCOUNTER — HOSPITAL ENCOUNTER (EMERGENCY)
Facility: HOSPITAL | Age: 29
Discharge: HOME | End: 2024-08-06

## 2024-08-06 VITALS
WEIGHT: 225 LBS | HEART RATE: 81 BPM | DIASTOLIC BLOOD PRESSURE: 78 MMHG | SYSTOLIC BLOOD PRESSURE: 131 MMHG | HEIGHT: 70 IN | RESPIRATION RATE: 20 BRPM | TEMPERATURE: 98.1 F | BODY MASS INDEX: 32.21 KG/M2 | OXYGEN SATURATION: 96 %

## 2024-08-06 DIAGNOSIS — J02.9 SORE THROAT: Primary | ICD-10-CM

## 2024-08-06 LAB
RSV RNA RESP QL NAA+PROBE: NOT DETECTED
S PYO DNA THROAT QL NAA+PROBE: NOT DETECTED
SARS-COV-2 RNA RESP QL NAA+PROBE: NOT DETECTED

## 2024-08-06 PROCEDURE — 71046 X-RAY EXAM CHEST 2 VIEWS: CPT | Mod: FOREIGN READ | Performed by: RADIOLOGY

## 2024-08-06 PROCEDURE — 87651 STREP A DNA AMP PROBE: CPT

## 2024-08-06 PROCEDURE — 2500000002 HC RX 250 W HCPCS SELF ADMINISTERED DRUGS (ALT 637 FOR MEDICARE OP, ALT 636 FOR OP/ED)

## 2024-08-06 PROCEDURE — 87634 RSV DNA/RNA AMP PROBE: CPT

## 2024-08-06 PROCEDURE — 71046 X-RAY EXAM CHEST 2 VIEWS: CPT

## 2024-08-06 PROCEDURE — 99283 EMERGENCY DEPT VISIT LOW MDM: CPT | Mod: 25

## 2024-08-06 PROCEDURE — 2500000004 HC RX 250 GENERAL PHARMACY W/ HCPCS (ALT 636 FOR OP/ED)

## 2024-08-06 PROCEDURE — 87635 SARS-COV-2 COVID-19 AMP PRB: CPT

## 2024-08-06 RX ORDER — PREDNISONE 20 MG/1
60 TABLET ORAL ONCE
Status: COMPLETED | OUTPATIENT
Start: 2024-08-06 | End: 2024-08-06

## 2024-08-06 RX ORDER — ACETAMINOPHEN 500 MG
1000 TABLET ORAL EVERY 8 HOURS PRN
Qty: 18 TABLET | Refills: 0 | Status: ON HOLD | OUTPATIENT
Start: 2024-08-06 | End: 2024-08-09

## 2024-08-06 RX ORDER — ALBUTEROL SULFATE 90 UG/1
1-2 INHALANT RESPIRATORY (INHALATION) EVERY 6 HOURS PRN
Qty: 18 G | Refills: 0 | Status: ON HOLD | OUTPATIENT
Start: 2024-08-06 | End: 2024-09-05

## 2024-08-06 RX ORDER — ACETAMINOPHEN 325 MG/1
975 TABLET ORAL ONCE
Status: COMPLETED | OUTPATIENT
Start: 2024-08-06 | End: 2024-08-06

## 2024-08-06 RX ORDER — AZITHROMYCIN 500 MG/1
500 TABLET, FILM COATED ORAL DAILY
Qty: 4 TABLET | Refills: 0 | Status: ON HOLD | OUTPATIENT
Start: 2024-08-06 | End: 2024-08-10

## 2024-08-06 RX ORDER — FLUTICASONE PROPIONATE 50 MCG
1 SPRAY, SUSPENSION (ML) NASAL DAILY
Qty: 16 G | Refills: 0 | Status: ON HOLD | OUTPATIENT
Start: 2024-08-06 | End: 2024-09-05

## 2024-08-06 RX ORDER — AZITHROMYCIN 250 MG/1
500 TABLET, FILM COATED ORAL ONCE
Status: COMPLETED | OUTPATIENT
Start: 2024-08-06 | End: 2024-08-06

## 2024-08-06 ASSESSMENT — PAIN DESCRIPTION - PROGRESSION: CLINICAL_PROGRESSION: NOT CHANGED

## 2024-08-06 ASSESSMENT — COLUMBIA-SUICIDE SEVERITY RATING SCALE - C-SSRS
1. IN THE PAST MONTH, HAVE YOU WISHED YOU WERE DEAD OR WISHED YOU COULD GO TO SLEEP AND NOT WAKE UP?: NO
6. HAVE YOU EVER DONE ANYTHING, STARTED TO DO ANYTHING, OR PREPARED TO DO ANYTHING TO END YOUR LIFE?: NO
2. HAVE YOU ACTUALLY HAD ANY THOUGHTS OF KILLING YOURSELF?: NO

## 2024-08-06 ASSESSMENT — PAIN SCALES - GENERAL
PAINLEVEL_OUTOF10: 3
PAINLEVEL_OUTOF10: 0 - NO PAIN
PAINLEVEL_OUTOF10: 3

## 2024-08-06 ASSESSMENT — PAIN DESCRIPTION - ONSET: ONSET: SUDDEN

## 2024-08-06 ASSESSMENT — PAIN DESCRIPTION - PAIN TYPE: TYPE: ACUTE PAIN

## 2024-08-06 ASSESSMENT — PAIN - FUNCTIONAL ASSESSMENT: PAIN_FUNCTIONAL_ASSESSMENT: 0-10

## 2024-08-06 ASSESSMENT — PAIN DESCRIPTION - LOCATION
LOCATION: THROAT
LOCATION: THROAT

## 2024-08-06 ASSESSMENT — PAIN DESCRIPTION - FREQUENCY: FREQUENCY: CONSTANT/CONTINUOUS

## 2024-08-06 ASSESSMENT — PAIN DESCRIPTION - DESCRIPTORS: DESCRIPTORS: ACHING

## 2024-08-06 NOTE — DISCHARGE INSTRUCTIONS
Please return to the ED immediately if you have any new or worsening signs or symptoms.  Please follow-up with your primary care doctor within 3 days  Please return to the ED immediately for began to experience neck pain or stiffness, unable to swallow, difficulty handling her secretions, change in voice, or any other new or worsening signs or symptoms

## 2024-08-06 NOTE — ED PROVIDER NOTES
HPI   Chief Complaint   Patient presents with    Sore Throat     Sore throat x 1 day.       28-year-old male with past medical history of asthma and hypertension presents the ED today with a chief concern of flulike symptoms.  Patient states that he has had sore throat since yesterday.  He states that he also has some left ear pain.  He states he has rhinorrhea and nasal congestion.  States that he had a couple episodes of nonbloody diarrhea yesterday as well as a couple episodes of nonbloody nonbilious vomiting.  Denies abdominal pain.  Denies chest pain or shortness of breath.  He denies fever/chills.  Denies any further episodes of nausea or vomiting.  He is up-to-date on all immunizations.  No known exposure to sick contacts.  He has no other symptoms or concerns at this time.      History provided by:  Patient   used: No            Patient History   Past Medical History:   Diagnosis Date    Asthma ([facility])     Difficulty breathing 06/26/2024    Comment on above: DIFFICULTY BREATHING    Disease due to severe acute respiratory syndrome coronavirus 2 (SARS-CoV-2) 06/26/2024    Fungal infection 06/26/2024    Headache 06/26/2024    Infection due to fungus 06/26/2024    Lateral epicondylitis 06/26/2024    Personal history of other diseases of the respiratory system 09/03/2021    History of asthma    Vomiting and diarrhea 06/26/2024     History reviewed. No pertinent surgical history.  No family history on file.  Social History     Tobacco Use    Smoking status: Every Day     Current packs/day: 0.33     Average packs/day: 0.3 packs/day for 17.6 years (5.8 ttl pk-yrs)     Types: Cigarettes     Start date: 2007    Smokeless tobacco: Never   Substance Use Topics    Alcohol use: Not on file    Drug use: Not on file       Physical Exam   ED Triage Vitals [08/06/24 1137]   Temperature Heart Rate Respirations BP   36.7 °C (98.1 °F) 88 20 144/84      Pulse Ox Temp Source Heart Rate Source Patient Position    96 % Temporal Monitor Sitting      BP Location FiO2 (%)     Right arm --       Physical Exam  Gen.: Vitals noted no distress afebrile. Normal phonation, no stridor, no trismus. Patient is handling secretions well without any tripod positioning or drooling.  ENT: TMs clear bilaterally, EACs unremarkable. Mastoids nontender. Posterior oropharynx mildly erythematous no tonsillar hypertrophy or exudate. Uvula is in the midline and nonedematous. No Jimmy's Angina.   Neck: Supple. No meningismus through full range of motion. No lymphadenopathy.   Cardiac: Regular rate rhythm no murmur.   Lungs: Good aeration throughout. No adventitious breath sounds.   Abdomen: Soft nontender nonsurgical throughout  Extremities: No peripheral edema. extremities are moist with good skin turgor.  Skin: No rash.   Neuro: No focal neurologic deficits. cranial nerves II-XII grossly intact.       ED Course & MDM   ED Course as of 08/06/24 1314   e Aug 06, 2024   1233 Group A strep negative []   1250 RSV and COVID-negative. [MC]   1250 IMPRESSION:  No acute process.  Signed by Luiz Alegria MD   [MC]   1313 Patient passed the p.o. challenge in the ED [MC]      ED Course User Index  [] Chandrakant Hare PA-C         Diagnoses as of 08/06/24 1314   Sore throat                       [place] Coma Scale Score: 15                        Medical Decision Making  28-year-old male past medical history of asthma and hypertension presents the ED today with a chief concern of sore throat.  Vital signs reassuring.  Patient overall appears well and is nontoxic-appearing.  Was given Tylenol, prednisone, and azithromycin in the ED.  Patient has full range of motion of the neck without any meningismus.  Satting well on room air.  Not hypoxic.  Not tachycardic.  Afebrile.  Patient is handling secretions well without any tripod positioning or drooling.  Normal phonation.  No potato voice.  He has full range of motion of the neck without any meningismus.  No  signs of meningitis.  No signs of PTA or retropharyngeal abscess.  Not concern for any GC/CHL pharyngitis.  Low suspicion for mono.  His chest x-ray shows no pneumonia.  He does feel acute symptoms are similar to when he had strep in the past.  No rash.  No signs of Jimmy angina.  There is no lip or tongue swelling.  No signs of airway compromise.  He does request a refill on his albuterol.  Will send this as well.  Does not have any wheezing or shortness of breath at this time.  Will treat outpatient with azithromycin, Flonase, and Tylenol.  Discussed use and possible side effects of these medications.  Discussed impression and findings with patient and he feels comfortable returning home.  We discussed very strict precautions including returning for any new or worsening signs home.  Patient is in agreement with this plan.  He will follow-up with his PCP within 3 days.  Again discussed strict precautions.    Differential diagnosis: Epiglottitis, foreign body, GC/CHL, Jimmy angina, mononucleosis, PTA, retropharyngeal abscess, strep pharyngitis, viral pharyngitis, RSV, COVID, influenza, group A strep, meningitis, pneumonia    Disposition/treatment  1.  Sore throat    Shared decision-making was used patient feels comfortable returning home     Patient was advised to follow up with recommended provider in 1 day1 for another evaluation and exam. I advised patient/guardian to return or go to closest emergency room immediately if symptoms change, get worse, new symptoms develop prior to follow up. If there is no improvement in symptoms in the next 24 hours they are advised to return for further evaluation and exam. I also explained the plan and treatment course. Patient/guardian is in agreement with plan, treatment course, and follow up and states verbally that they will comply.    Homegoing. I discussed the differential; results and discharge plan with the patient and/or family/friend/caregiver if present.  I  emphasized the importance of follow-up with the physician I referred them to in the timeframe recommended.  I explained reasons for the patient to return to the Emergency Department. They agreed that if they feel their condition is worsening or if they have any other concern they should call 911 immediately for further assistance. I gave the patient an opportunity to ask all questions they had and answered all of them accordingly. They understand return precautions and discharge instructions. The patient and/or family/friend/caregiver expressed understanding verbally and that they would comply.        This note has been transcribed using voice recognition and may contain grammatical errors, misplaced words, incorrect words, incorrect phrases or other errors.        Procedure  Procedures     Chandrakant Hare PA-C  08/06/24 131       Chandrakant Hare PA-C  08/06/24 1317

## 2024-08-08 ENCOUNTER — HOSPITAL ENCOUNTER (EMERGENCY)
Facility: HOSPITAL | Age: 29
Discharge: HOME | End: 2024-08-08
Attending: EMERGENCY MEDICINE
Payer: COMMERCIAL

## 2024-08-08 ENCOUNTER — APPOINTMENT (OUTPATIENT)
Dept: CARDIOLOGY | Facility: HOSPITAL | Age: 29
End: 2024-08-08
Payer: COMMERCIAL

## 2024-08-08 ENCOUNTER — APPOINTMENT (OUTPATIENT)
Dept: RADIOLOGY | Facility: HOSPITAL | Age: 29
End: 2024-08-08
Payer: COMMERCIAL

## 2024-08-08 VITALS
TEMPERATURE: 97.7 F | BODY MASS INDEX: 32.21 KG/M2 | OXYGEN SATURATION: 97 % | SYSTOLIC BLOOD PRESSURE: 148 MMHG | RESPIRATION RATE: 19 BRPM | HEART RATE: 74 BPM | DIASTOLIC BLOOD PRESSURE: 74 MMHG | WEIGHT: 225 LBS | HEIGHT: 70 IN

## 2024-08-08 DIAGNOSIS — J45.901 ASTHMA WITH ACUTE EXACERBATION, UNSPECIFIED ASTHMA SEVERITY, UNSPECIFIED WHETHER PERSISTENT (HHS-HCC): Primary | ICD-10-CM

## 2024-08-08 LAB — SARS-COV-2 RNA RESP QL NAA+PROBE: NOT DETECTED

## 2024-08-08 PROCEDURE — 99285 EMERGENCY DEPT VISIT HI MDM: CPT | Mod: 25

## 2024-08-08 PROCEDURE — 71046 X-RAY EXAM CHEST 2 VIEWS: CPT | Mod: FOREIGN READ | Performed by: RADIOLOGY

## 2024-08-08 PROCEDURE — 94640 AIRWAY INHALATION TREATMENT: CPT

## 2024-08-08 PROCEDURE — 71046 X-RAY EXAM CHEST 2 VIEWS: CPT

## 2024-08-08 PROCEDURE — 87635 SARS-COV-2 COVID-19 AMP PRB: CPT | Performed by: EMERGENCY MEDICINE

## 2024-08-08 PROCEDURE — 93005 ELECTROCARDIOGRAM TRACING: CPT

## 2024-08-08 PROCEDURE — 2500000002 HC RX 250 W HCPCS SELF ADMINISTERED DRUGS (ALT 637 FOR MEDICARE OP, ALT 636 FOR OP/ED): Performed by: EMERGENCY MEDICINE

## 2024-08-08 RX ORDER — PREDNISONE 20 MG/1
60 TABLET ORAL DAILY
Qty: 15 TABLET | Refills: 0 | Status: SHIPPED | OUTPATIENT
Start: 2024-08-08 | End: 2024-08-09 | Stop reason: DRUGHIGH

## 2024-08-08 RX ORDER — ALBUTEROL SULFATE 90 UG/1
1-2 INHALANT RESPIRATORY (INHALATION) EVERY 6 HOURS PRN
Qty: 18 G | Refills: 0 | Status: ON HOLD | OUTPATIENT
Start: 2024-08-08 | End: 2024-09-07

## 2024-08-08 RX ORDER — IPRATROPIUM BROMIDE AND ALBUTEROL SULFATE 2.5; .5 MG/3ML; MG/3ML
3 SOLUTION RESPIRATORY (INHALATION) EVERY 20 MIN
Status: COMPLETED | OUTPATIENT
Start: 2024-08-08 | End: 2024-08-08

## 2024-08-08 ASSESSMENT — LIFESTYLE VARIABLES
HAVE PEOPLE ANNOYED YOU BY CRITICIZING YOUR DRINKING: NO
EVER HAD A DRINK FIRST THING IN THE MORNING TO STEADY YOUR NERVES TO GET RID OF A HANGOVER: NO
EVER FELT BAD OR GUILTY ABOUT YOUR DRINKING: NO
TOTAL SCORE: 0
HAVE YOU EVER FELT YOU SHOULD CUT DOWN ON YOUR DRINKING: NO

## 2024-08-08 ASSESSMENT — PAIN - FUNCTIONAL ASSESSMENT: PAIN_FUNCTIONAL_ASSESSMENT: 0-10

## 2024-08-08 ASSESSMENT — PAIN SCALES - GENERAL
PAINLEVEL_OUTOF10: 0 - NO PAIN
PAINLEVEL_OUTOF10: 2

## 2024-08-08 NOTE — ED PROVIDER NOTES
[facility]   Chief Complaint   Patient presents with    Shortness of Breath         History provided by:  Patient and EMS personnel  History of Present Illness:  28-year-old male presents by EMS with worsening shortness of breath.  Patient is here as he has a known history of asthma.  He tried his inhaler at home with no relief.  EMS administered multiple breathing treatments as well as IV Solu-Medrol and magnesium.  The patient states that his symptoms seem to be improving now.  He shares that he was recently diagnosed with strep throat and has been on antibiotics.  No fever or chills.  No cough or congestion.  No leg swelling.  No chest pain.  No back or flank pain.  No sick contacts.  No trauma or travel.      PMFSH:   As per [facility], otherwise nurses notes reviewed in EMR.    Past Medical History:   Active Ambulatory Problems     Diagnosis Date Noted    Asthma ([facility]) 06/26/2024    Chronic bilateral low back pain with bilateral sciatica 08/05/2023    Foot pain 06/26/2024    Foot swelling 06/26/2024    Pain and swelling of ankle 06/26/2024    Gastroesophageal reflux disease without esophagitis 08/05/2023    Moderate persistent asthma without complication ([facility]) 08/05/2023    Primary hypertension 08/05/2023     Resolved Ambulatory Problems     Diagnosis Date Noted    Difficulty breathing 06/26/2024    Disease due to severe acute respiratory syndrome coronavirus 2 (SARS-CoV-2) 06/26/2024    Foot sprain 06/26/2024    Fungal infection 06/26/2024    Infection due to fungus 06/26/2024    Headache 06/26/2024    Lateral epicondylitis 06/26/2024    Vomiting and diarrhea 06/26/2024     Past Medical History:   Diagnosis Date    Personal history of other diseases of the respiratory system 09/03/2021      Past Surgical History: No past surgical history on file.   Family History: No family history on file.   Social History:    Social History     Socioeconomic History    Marital status: Single     Spouse name: Not on file    Number  of children: Not on file    Years of education: Not on file    Highest education level: Not on file   Occupational History    Not on file   Tobacco Use    Smoking status: Every Day     Current packs/day: 0.33     Average packs/day: 0.3 packs/day for 17.6 years (5.8 ttl pk-yrs)     Types: Cigarettes     Start date: 2007    Smokeless tobacco: Never   Substance and Sexual Activity    Alcohol use: Not on file    Drug use: Not on file    Sexual activity: Not on file   Other Topics Concern    Not on file   Social History Narrative    Not on file     Social Determinants of Health     Financial Resource Strain: Low Risk  (10/14/2021)    [facility]    Overall Financial Resource Strain (CARDIA)     Difficulty of Paying Living Expenses: Not very hard   Food Insecurity: No Food Insecurity (10/14/2021)    Received from [facility]    Hunger Vital Sign     Worried About Running Out of Food in the Last Year: Never true     Ran Out of Food in the Last Year: Never true   Transportation Needs: Not on file   Physical Activity: Not on file   Stress: Not on file   Social Connections: Not on file   Intimate Partner Violence: Not on file   Housing Stability: Not on file              Patient History   Past Medical History:   Diagnosis Date    Asthma ([facility])     Difficulty breathing 06/26/2024    Comment on above: DIFFICULTY BREATHING    Disease due to severe acute respiratory syndrome coronavirus 2 (SARS-CoV-2) 06/26/2024    Fungal infection 06/26/2024    Headache 06/26/2024    Infection due to fungus 06/26/2024    Lateral epicondylitis 06/26/2024    Personal history of other diseases of the respiratory system 09/03/2021    History of asthma    Vomiting and diarrhea 06/26/2024     No past surgical history on file.  No family history on file.  Social History     Tobacco Use    Smoking status: Every Day      Current packs/day: 0.33     Average packs/day: 0.3 packs/day for 17.6 years (5.8 ttl pk-yrs)     Types: Cigarettes     Start date: 2007    Smokeless tobacco: Never   Substance Use Topics    Alcohol use: Not on file    Drug use: Not on file       Physical Exam   ED Triage Vitals   Temp Pulse Resp BP   -- -- -- --      SpO2 Temp src Heart Rate Source Patient Position   -- -- -- --      BP Location FiO2 (%)     -- --       Physical Exam  Physical Exam:    ED Triage Vitals   Temp Pulse Resp BP   -- -- -- --      SpO2 Temp src Heart Rate Source Patient Position   -- -- -- --      BP Location FiO2 (%)     -- --         Constitutional: Vital signs per nursing notes.  Well developed, well nourished.  Mild acute distress.    Psychiatric: alert and oriented to person, place, and time; no abnormalities of mood or affect; memory intact  Eyes: PERRL; conjunctivae and lids normal; EOMI  ENT: otoscopic exam of external canal and TM´s normal; nasal mucosa, turbinates, and septum normal; mouth, tongue, and pharynx normal; pharynx without edema, exudate, or injection  Respiratory: normal respiratory effort and excursion; no rales, rhonchi, or wheezes; equal but diminished air entry  Cardiovascular: regular rate and rhythm; no murmurs, rubs or gallops; symmetric pulses; no edema; normal capillary refill; distal pulses present  Neurological: normal speech; CN II-XII grossly intact; DTRs normal, no pathologic reflexes; normal motor and sensory function; no nystagmus; no pronator drift  GI: no masses, tenderness, rebound or guarding; no palpable, pulsatile mass; no organomegaly; no hernia; normal bowel sounds; (-) Preston´s sign; (-) McBurney´s sign; (-) CVA tenderness  Lymphatic: no adenopathy of neck  Musculoskeletal: normal gait and station; normal digits and nails; no gross tendon or ligament injury; normal to palpation; normal strength/tone; neurovascular status intact; (-) Uyen´s sign; (-) straight leg raise  Skin: normal to  inspection; normal to palpation; no rash  GCS: 15      ED Course & MDM   Diagnoses as of 08/08/24 1336   Asthma with acute exacerbation, unspecified asthma severity, unspecified whether persistent ([facility])                 No data recorded     [place] Coma Scale Score: 15 (08/08/24 1139 : Patty Hernández, FLETCHER)                           Medical Decision Making  Medical Decision Making:    Differential Diagnoses Considered: Asthma exacerbation, pneumonia, pneumothorax, COVID     EKG: My interpretation of EKG is normal sinus rhythm 80 bpm with no acute ST-T changes    Labs Reviewed   SARS-COV-2 PCR - Normal       Result Value    Coronavirus 2019, PCR Not Detected      Narrative:     This assay has received FDA Emergency Use Authorization (EUA) and is only authorized for the duration of time that circumstances exist to justify the authorization of the emergency use of in vitro diagnostic tests for the detection of SARS-CoV-2 virus and/or diagnosis of COVID-19 infection under section 564(b)(1) of the Act, 21 U.S.C. 360bbb-3(b)(1). This assay is an in vitro diagnostic nucleic acid amplification test for the qualitative detection of SARS-CoV-2 from nasopharyngeal specimens and has been validated for use at [facility]. Negative results do not preclude COVID-19 infections and should not be used as the sole basis for diagnosis, treatment, or other management decisions.         XR chest 2 views   Final Result   No acute pulmonary pathology.   Signed by Collin Best MD          Diagnostic testing considered:         Review of recent and relevant records:    ED Medication Administration:   ED Medication Administration from 08/08/2024 1125 to 08/08/2024 1336         Date/Time Order Dose Route Action Action by     08/08/2024 1136 EDT ipratropium-albuteroL (Duo-Neb) 0.5-2.5 mg/3 mL nebulizer solution 3 mL 3 mL nebulization Given Rozsajamesgyi, O     08/08/2024 1137 EDT ipratropium-albuteroL (Duo-Neb) 0.5-2.5 mg/3  mL nebulizer solution 3 mL 3 mL nebulization Given Rozsahegyi, O     08/08/2024 1138 EDT ipratropium-albuteroL (Duo-Neb) 0.5-2.5 mg/3 mL nebulizer solution 3 mL 3 mL nebulization Given Rozsahegyi, O            Prescription Medication Consideration/Given:     Social Determinants of Health Significantly Affecting Care:      Summary:    /74 (08/08/24 1330)    Temp      Pulse 74 (08/08/24 1330)   Resp 19 (08/08/24 1330)    SpO2 97 % (08/08/24 1330)      Abnormal Labs Reviewed - No abnormal labs to display    Diagnostic evaluation was completed.  COVID is negative.  Chest x-ray shows no acute pulmonary pathology.    Repeat evaluation of the patient at 1:30 PM shows the patient is feeling much better.  Vital signs are stable.  He is able to ambulate to the bathroom without issue.    I suspect the patient likely suffered an acute asthma exacerbation.  He will be discharged home with short-term follow-up with his primary care provider.  I will prescribe albuterol and steroids for him.    I discussed the results and discharge plan with the patient and/or family/friend if present.  I emphasized the importance of follow up with the physician I referred them to in the timeframe recommended.  I explained reasons for the patient to return to the Emergency Department.  Questions were addressed.  The patient and/or family/friend expressed understanding.        Diagnoses as of 08/08/24 1336   Asthma with acute exacerbation, unspecified asthma severity, unspecified whether persistent ([facility])         Procedure  Procedures     Jorge PENA MD  08/08/24 1336

## 2024-08-09 ENCOUNTER — APPOINTMENT (OUTPATIENT)
Dept: RADIOLOGY | Facility: HOSPITAL | Age: 29
End: 2024-08-09

## 2024-08-09 ENCOUNTER — APPOINTMENT (OUTPATIENT)
Dept: CARDIOLOGY | Facility: HOSPITAL | Age: 29
End: 2024-08-09

## 2024-08-09 ENCOUNTER — HOSPITAL ENCOUNTER (INPATIENT)
Facility: HOSPITAL | Age: 29
End: 2024-08-09
Attending: STUDENT IN AN ORGANIZED HEALTH CARE EDUCATION/TRAINING PROGRAM | Admitting: INTERNAL MEDICINE
Payer: COMMERCIAL

## 2024-08-09 DIAGNOSIS — R06.02 SHORTNESS OF BREATH: ICD-10-CM

## 2024-08-09 DIAGNOSIS — J45.21 MILD INTERMITTENT ASTHMA WITH EXACERBATION (HHS-HCC): Primary | ICD-10-CM

## 2024-08-09 LAB
ALBUMIN SERPL BCP-MCNC: 4.5 G/DL (ref 3.4–5)
ALP SERPL-CCNC: 55 U/L (ref 33–120)
ALT SERPL W P-5'-P-CCNC: 33 U/L (ref 10–52)
ANION GAP BLDV CALCULATED.4IONS-SCNC: 16 MMOL/L (ref 10–25)
ANION GAP SERPL CALC-SCNC: 15 MMOL/L (ref 10–20)
AST SERPL W P-5'-P-CCNC: 17 U/L (ref 9–39)
ATRIAL RATE: 80 BPM
BASE EXCESS BLDV CALC-SCNC: -0.3 MMOL/L (ref -2–3)
BASOPHILS # BLD AUTO: 0.01 X10*3/UL (ref 0–0.1)
BASOPHILS NFR BLD AUTO: 0.1 %
BILIRUB SERPL-MCNC: 0.3 MG/DL (ref 0–1.2)
BODY TEMPERATURE: ABNORMAL
BUN SERPL-MCNC: 14 MG/DL (ref 6–23)
CA-I BLDV-SCNC: 1.34 MMOL/L (ref 1.1–1.33)
CALCIUM SERPL-MCNC: 10.6 MG/DL (ref 8.6–10.3)
CARDIAC TROPONIN I PNL SERPL HS: 8 NG/L (ref 0–20)
CARDIAC TROPONIN I PNL SERPL HS: 9 NG/L (ref 0–20)
CHLORIDE BLDV-SCNC: 102 MMOL/L (ref 98–107)
CHLORIDE SERPL-SCNC: 102 MMOL/L (ref 98–107)
CO2 SERPL-SCNC: 24 MMOL/L (ref 21–32)
CREAT SERPL-MCNC: 0.93 MG/DL (ref 0.5–1.3)
EGFRCR SERPLBLD CKD-EPI 2021: >90 ML/MIN/1.73M*2
EOSINOPHIL # BLD AUTO: 0.02 X10*3/UL (ref 0–0.7)
EOSINOPHIL NFR BLD AUTO: 0.1 %
ERYTHROCYTE [DISTWIDTH] IN BLOOD BY AUTOMATED COUNT: 12.7 % (ref 11.5–14.5)
GLUCOSE BLDV-MCNC: 204 MG/DL (ref 74–99)
GLUCOSE SERPL-MCNC: 192 MG/DL (ref 74–99)
HCO3 BLDV-SCNC: 22.9 MMOL/L (ref 22–26)
HCT VFR BLD AUTO: 44.6 % (ref 41–52)
HCT VFR BLD EST: 46 % (ref 41–52)
HGB BLD-MCNC: 15.3 G/DL (ref 13.5–17.5)
HGB BLDV-MCNC: 15.3 G/DL (ref 13.5–17.5)
HOLD SPECIMEN: NORMAL
IMM GRANULOCYTES # BLD AUTO: 0.09 X10*3/UL (ref 0–0.7)
IMM GRANULOCYTES NFR BLD AUTO: 0.6 % (ref 0–0.9)
INHALED O2 CONCENTRATION: 21 %
LACTATE BLDV-SCNC: 2.2 MMOL/L (ref 0.4–2)
LACTATE BLDV-SCNC: 3.8 MMOL/L (ref 0.4–2)
LYMPHOCYTES # BLD AUTO: 2.64 X10*3/UL (ref 1.2–4.8)
LYMPHOCYTES NFR BLD AUTO: 16.7 %
MAGNESIUM SERPL-MCNC: 1.82 MG/DL (ref 1.6–2.4)
MCH RBC QN AUTO: 30.1 PG (ref 26–34)
MCHC RBC AUTO-ENTMCNC: 34.3 G/DL (ref 32–36)
MCV RBC AUTO: 88 FL (ref 80–100)
MONOCYTES # BLD AUTO: 1.04 X10*3/UL (ref 0.1–1)
MONOCYTES NFR BLD AUTO: 6.6 %
NEUTROPHILS # BLD AUTO: 12.02 X10*3/UL (ref 1.2–7.7)
NEUTROPHILS NFR BLD AUTO: 75.9 %
NRBC BLD-RTO: 0 /100 WBCS (ref 0–0)
OXYHGB MFR BLDV: 89.7 % (ref 45–75)
P AXIS: 30 DEGREES
P OFFSET: 179 MS
P ONSET: 138 MS
PCO2 BLDV: 33 MM HG (ref 41–51)
PH BLDV: 7.45 PH (ref 7.33–7.43)
PLATELET # BLD AUTO: 207 X10*3/UL (ref 150–450)
PO2 BLDV: 72 MM HG (ref 35–45)
POTASSIUM BLDV-SCNC: 4.2 MMOL/L (ref 3.5–5.3)
POTASSIUM SERPL-SCNC: 3.9 MMOL/L (ref 3.5–5.3)
PR INTERVAL: 156 MS
PROT SERPL-MCNC: 7 G/DL (ref 6.4–8.2)
Q ONSET: 216 MS
QRS COUNT: 13 BEATS
QRS DURATION: 90 MS
QT INTERVAL: 370 MS
QTC CALCULATION(BAZETT): 426 MS
QTC FREDERICIA: 407 MS
R AXIS: 48 DEGREES
RBC # BLD AUTO: 5.09 X10*6/UL (ref 4.5–5.9)
SAO2 % BLDV: 97 % (ref 45–75)
SODIUM BLDV-SCNC: 137 MMOL/L (ref 136–145)
SODIUM SERPL-SCNC: 137 MMOL/L (ref 136–145)
T AXIS: 51 DEGREES
T OFFSET: 401 MS
VENTRICULAR RATE: 80 BPM
WBC # BLD AUTO: 15.8 X10*3/UL (ref 4.4–11.3)

## 2024-08-09 PROCEDURE — 2500000002 HC RX 250 W HCPCS SELF ADMINISTERED DRUGS (ALT 637 FOR MEDICARE OP, ALT 636 FOR OP/ED): Performed by: STUDENT IN AN ORGANIZED HEALTH CARE EDUCATION/TRAINING PROGRAM

## 2024-08-09 PROCEDURE — 96365 THER/PROPH/DIAG IV INF INIT: CPT

## 2024-08-09 PROCEDURE — 83605 ASSAY OF LACTIC ACID: CPT

## 2024-08-09 PROCEDURE — 71045 X-RAY EXAM CHEST 1 VIEW: CPT

## 2024-08-09 PROCEDURE — 82947 ASSAY GLUCOSE BLOOD QUANT: CPT

## 2024-08-09 PROCEDURE — 2500000002 HC RX 250 W HCPCS SELF ADMINISTERED DRUGS (ALT 637 FOR MEDICARE OP, ALT 636 FOR OP/ED): Performed by: INTERNAL MEDICINE

## 2024-08-09 PROCEDURE — 36415 COLL VENOUS BLD VENIPUNCTURE: CPT

## 2024-08-09 PROCEDURE — G0378 HOSPITAL OBSERVATION PER HR: HCPCS

## 2024-08-09 PROCEDURE — 93005 ELECTROCARDIOGRAM TRACING: CPT

## 2024-08-09 PROCEDURE — 2500000001 HC RX 250 WO HCPCS SELF ADMINISTERED DRUGS (ALT 637 FOR MEDICARE OP): Performed by: INTERNAL MEDICINE

## 2024-08-09 PROCEDURE — 71045 X-RAY EXAM CHEST 1 VIEW: CPT | Performed by: STUDENT IN AN ORGANIZED HEALTH CARE EDUCATION/TRAINING PROGRAM

## 2024-08-09 PROCEDURE — 93010 ELECTROCARDIOGRAM REPORT: CPT | Performed by: INTERNAL MEDICINE

## 2024-08-09 PROCEDURE — 99223 1ST HOSP IP/OBS HIGH 75: CPT | Performed by: INTERNAL MEDICINE

## 2024-08-09 PROCEDURE — 93010 ELECTROCARDIOGRAM REPORT: CPT | Performed by: STUDENT IN AN ORGANIZED HEALTH CARE EDUCATION/TRAINING PROGRAM

## 2024-08-09 PROCEDURE — 83735 ASSAY OF MAGNESIUM: CPT

## 2024-08-09 PROCEDURE — 84484 ASSAY OF TROPONIN QUANT: CPT

## 2024-08-09 PROCEDURE — 96375 TX/PRO/DX INJ NEW DRUG ADDON: CPT

## 2024-08-09 PROCEDURE — 80053 COMPREHEN METABOLIC PANEL: CPT

## 2024-08-09 PROCEDURE — 99285 EMERGENCY DEPT VISIT HI MDM: CPT | Performed by: STUDENT IN AN ORGANIZED HEALTH CARE EDUCATION/TRAINING PROGRAM

## 2024-08-09 PROCEDURE — 2500000004 HC RX 250 GENERAL PHARMACY W/ HCPCS (ALT 636 FOR OP/ED)

## 2024-08-09 PROCEDURE — 2500000002 HC RX 250 W HCPCS SELF ADMINISTERED DRUGS (ALT 637 FOR MEDICARE OP, ALT 636 FOR OP/ED)

## 2024-08-09 PROCEDURE — 94640 AIRWAY INHALATION TREATMENT: CPT

## 2024-08-09 PROCEDURE — 99285 EMERGENCY DEPT VISIT HI MDM: CPT | Mod: 25

## 2024-08-09 PROCEDURE — 85025 COMPLETE CBC W/AUTO DIFF WBC: CPT

## 2024-08-09 PROCEDURE — S4991 NICOTINE PATCH NONLEGEND: HCPCS

## 2024-08-09 RX ORDER — PREDNISONE 10 MG/1
50 TABLET ORAL DAILY
Qty: 25 TABLET | Refills: 0 | Status: SHIPPED | OUTPATIENT
Start: 2024-08-09 | End: 2024-08-17

## 2024-08-09 RX ORDER — ACETAMINOPHEN 325 MG/1
650 TABLET ORAL EVERY 4 HOURS PRN
Status: DISCONTINUED | OUTPATIENT
Start: 2024-08-09 | End: 2024-08-11

## 2024-08-09 RX ORDER — IBUPROFEN 200 MG
1 TABLET ORAL DAILY
Status: DISCONTINUED | OUTPATIENT
Start: 2024-08-09 | End: 2024-08-17 | Stop reason: HOSPADM

## 2024-08-09 RX ORDER — MAGNESIUM SULFATE HEPTAHYDRATE 40 MG/ML
2 INJECTION, SOLUTION INTRAVENOUS ONCE
Status: COMPLETED | OUTPATIENT
Start: 2024-08-09 | End: 2024-08-09

## 2024-08-09 RX ORDER — BUPROPION HYDROCHLORIDE 150 MG/1
150 TABLET ORAL 2 TIMES DAILY
Status: DISCONTINUED | OUTPATIENT
Start: 2024-08-09 | End: 2024-08-17 | Stop reason: HOSPADM

## 2024-08-09 RX ORDER — ACETAMINOPHEN 650 MG/1
650 SUPPOSITORY RECTAL EVERY 4 HOURS PRN
Status: DISCONTINUED | OUTPATIENT
Start: 2024-08-09 | End: 2024-08-11

## 2024-08-09 RX ORDER — ALBUTEROL SULFATE 0.83 MG/ML
2.5 SOLUTION RESPIRATORY (INHALATION) 4 TIMES DAILY
Status: DISCONTINUED | OUTPATIENT
Start: 2024-08-09 | End: 2024-08-09

## 2024-08-09 RX ORDER — ACETAMINOPHEN 160 MG/5ML
650 SOLUTION ORAL EVERY 4 HOURS PRN
Status: DISCONTINUED | OUTPATIENT
Start: 2024-08-09 | End: 2024-08-11

## 2024-08-09 RX ORDER — AZITHROMYCIN 500 MG/1
500 TABLET, FILM COATED ORAL DAILY
Status: DISCONTINUED | OUTPATIENT
Start: 2024-08-09 | End: 2024-08-14

## 2024-08-09 RX ORDER — FLUTICASONE PROPIONATE 50 MCG
1 SPRAY, SUSPENSION (ML) NASAL DAILY
Status: DISCONTINUED | OUTPATIENT
Start: 2024-08-09 | End: 2024-08-17 | Stop reason: HOSPADM

## 2024-08-09 RX ORDER — TALC
3 POWDER (GRAM) TOPICAL NIGHTLY PRN
Status: DISCONTINUED | OUTPATIENT
Start: 2024-08-09 | End: 2024-08-17 | Stop reason: HOSPADM

## 2024-08-09 RX ORDER — IPRATROPIUM BROMIDE AND ALBUTEROL SULFATE 2.5; .5 MG/3ML; MG/3ML
3 SOLUTION RESPIRATORY (INHALATION) ONCE
Status: COMPLETED | OUTPATIENT
Start: 2024-08-09 | End: 2024-08-09

## 2024-08-09 RX ORDER — ACETAMINOPHEN 325 MG/1
1000 TABLET ORAL EVERY 8 HOURS PRN
Status: DISCONTINUED | OUTPATIENT
Start: 2024-08-09 | End: 2024-08-17 | Stop reason: HOSPADM

## 2024-08-09 RX ORDER — ONDANSETRON HYDROCHLORIDE 2 MG/ML
4 INJECTION, SOLUTION INTRAVENOUS EVERY 8 HOURS PRN
Status: DISCONTINUED | OUTPATIENT
Start: 2024-08-09 | End: 2024-08-17 | Stop reason: HOSPADM

## 2024-08-09 RX ORDER — PREDNISONE 10 MG/1
20 TABLET ORAL DAILY
COMMUNITY
End: 2024-08-17 | Stop reason: HOSPADM

## 2024-08-09 RX ORDER — IPRATROPIUM BROMIDE AND ALBUTEROL SULFATE 2.5; .5 MG/3ML; MG/3ML
6 SOLUTION RESPIRATORY (INHALATION) ONCE
Status: COMPLETED | OUTPATIENT
Start: 2024-08-09 | End: 2024-08-09

## 2024-08-09 RX ORDER — ENOXAPARIN SODIUM 100 MG/ML
40 INJECTION SUBCUTANEOUS EVERY 24 HOURS
Status: DISCONTINUED | OUTPATIENT
Start: 2024-08-09 | End: 2024-08-17 | Stop reason: HOSPADM

## 2024-08-09 RX ORDER — ALBUTEROL SULFATE 0.83 MG/ML
2.5 SOLUTION RESPIRATORY (INHALATION)
Status: DISCONTINUED | OUTPATIENT
Start: 2024-08-10 | End: 2024-08-10

## 2024-08-09 RX ORDER — IPRATROPIUM BROMIDE AND ALBUTEROL SULFATE 2.5; .5 MG/3ML; MG/3ML
3 SOLUTION RESPIRATORY (INHALATION)
Status: DISCONTINUED | OUTPATIENT
Start: 2024-08-09 | End: 2024-08-09

## 2024-08-09 RX ORDER — ALBUTEROL SULFATE 0.83 MG/ML
2.5 SOLUTION RESPIRATORY (INHALATION) EVERY 2 HOUR PRN
Status: DISCONTINUED | OUTPATIENT
Start: 2024-08-09 | End: 2024-08-10

## 2024-08-09 RX ORDER — POLYETHYLENE GLYCOL 3350 17 G/17G
17 POWDER, FOR SOLUTION ORAL DAILY
Status: DISCONTINUED | OUTPATIENT
Start: 2024-08-09 | End: 2024-08-17 | Stop reason: HOSPADM

## 2024-08-09 RX ORDER — ONDANSETRON 4 MG/1
4 TABLET, FILM COATED ORAL EVERY 8 HOURS PRN
Status: DISCONTINUED | OUTPATIENT
Start: 2024-08-09 | End: 2024-08-16

## 2024-08-09 RX ORDER — ALBUTEROL SULFATE 0.83 MG/ML
2.5 SOLUTION RESPIRATORY (INHALATION) DAILY
COMMUNITY
End: 2024-08-17 | Stop reason: HOSPADM

## 2024-08-09 RX ADMIN — IPRATROPIUM BROMIDE AND ALBUTEROL SULFATE 6 ML: 2.5; .5 SOLUTION RESPIRATORY (INHALATION) at 10:59

## 2024-08-09 RX ADMIN — BUPROPION HYDROCHLORIDE 150 MG: 150 TABLET, FILM COATED, EXTENDED RELEASE ORAL at 20:51

## 2024-08-09 RX ADMIN — FLUTICASONE PROPIONATE 1 SPRAY: 50 SPRAY, METERED NASAL at 20:52

## 2024-08-09 RX ADMIN — AZITHROMYCIN DIHYDRATE 500 MG: 500 TABLET ORAL at 17:37

## 2024-08-09 RX ADMIN — MAGNESIUM SULFATE HEPTAHYDRATE 2 G: 40 INJECTION, SOLUTION INTRAVENOUS at 11:01

## 2024-08-09 RX ADMIN — NICOTINE 1 PATCH: 21 PATCH, EXTENDED RELEASE TRANSDERMAL at 16:28

## 2024-08-09 RX ADMIN — METHYLPREDNISOLONE SODIUM SUCCINATE 125 MG: 125 INJECTION, POWDER, FOR SOLUTION INTRAMUSCULAR; INTRAVENOUS at 16:28

## 2024-08-09 RX ADMIN — IPRATROPIUM BROMIDE AND ALBUTEROL SULFATE 3 ML: 2.5; .5 SOLUTION RESPIRATORY (INHALATION) at 10:55

## 2024-08-09 RX ADMIN — ALBUTEROL SULFATE 2.5 MG: 2.5 SOLUTION RESPIRATORY (INHALATION) at 20:14

## 2024-08-09 RX ADMIN — ALBUTEROL SULFATE 2.5 MG: 2.5 SOLUTION RESPIRATORY (INHALATION) at 23:28

## 2024-08-09 SDOH — SOCIAL STABILITY: SOCIAL INSECURITY: ARE THERE ANY APPARENT SIGNS OF INJURIES/BEHAVIORS THAT COULD BE RELATED TO ABUSE/NEGLECT?: NO

## 2024-08-09 SDOH — ECONOMIC STABILITY: INCOME INSECURITY: IN THE LAST 12 MONTHS, WAS THERE A TIME WHEN YOU WERE NOT ABLE TO PAY THE MORTGAGE OR RENT ON TIME?: NO

## 2024-08-09 SDOH — SOCIAL STABILITY: SOCIAL INSECURITY: DOES ANYONE TRY TO KEEP YOU FROM HAVING/CONTACTING OTHER FRIENDS OR DOING THINGS OUTSIDE YOUR HOME?: NO

## 2024-08-09 SDOH — SOCIAL STABILITY: SOCIAL INSECURITY: WERE YOU ABLE TO COMPLETE ALL THE BEHAVIORAL HEALTH SCREENINGS?: YES

## 2024-08-09 SDOH — ECONOMIC STABILITY: HOUSING INSECURITY: AT ANY TIME IN THE PAST 12 MONTHS, WERE YOU HOMELESS OR LIVING IN A SHELTER (INCLUDING NOW)?: NO

## 2024-08-09 SDOH — ECONOMIC STABILITY: HOUSING INSECURITY: IN THE LAST 12 MONTHS, WAS THERE A TIME WHEN YOU WERE NOT ABLE TO PAY THE MORTGAGE OR RENT ON TIME?: NO

## 2024-08-09 SDOH — SOCIAL STABILITY: SOCIAL INSECURITY: HAS ANYONE EVER THREATENED TO HURT YOUR FAMILY OR YOUR PETS?: NO

## 2024-08-09 SDOH — SOCIAL STABILITY: SOCIAL INSECURITY: ABUSE: ADULT

## 2024-08-09 SDOH — SOCIAL STABILITY: SOCIAL INSECURITY: HAVE YOU HAD ANY THOUGHTS OF HARMING ANYONE ELSE?: NO

## 2024-08-09 SDOH — ECONOMIC STABILITY: TRANSPORTATION INSECURITY: IN THE PAST 12 MONTHS, HAS LACK OF TRANSPORTATION KEPT YOU FROM MEDICAL APPOINTMENTS OR FROM GETTING MEDICATIONS?: NO

## 2024-08-09 SDOH — SOCIAL STABILITY: SOCIAL INSECURITY: HAVE YOU HAD THOUGHTS OF HARMING ANYONE ELSE?: NO

## 2024-08-09 SDOH — ECONOMIC STABILITY: TRANSPORTATION INSECURITY
IN THE PAST 12 MONTHS, HAS LACK OF TRANSPORTATION KEPT YOU FROM MEETINGS, WORK, OR FROM GETTING THINGS NEEDED FOR DAILY LIVING?: NO

## 2024-08-09 SDOH — ECONOMIC STABILITY: TRANSPORTATION INSECURITY
IN THE PAST 12 MONTHS, HAS THE LACK OF TRANSPORTATION KEPT YOU FROM MEDICAL APPOINTMENTS OR FROM GETTING MEDICATIONS?: NO

## 2024-08-09 SDOH — SOCIAL STABILITY: SOCIAL INSECURITY: DO YOU FEEL UNSAFE GOING BACK TO THE PLACE WHERE YOU ARE LIVING?: NO

## 2024-08-09 SDOH — ECONOMIC STABILITY: INCOME INSECURITY: HOW HARD IS IT FOR YOU TO PAY FOR THE VERY BASICS LIKE FOOD, HOUSING, MEDICAL CARE, AND HEATING?: NOT HARD AT ALL

## 2024-08-09 SDOH — SOCIAL STABILITY: SOCIAL INSECURITY: DO YOU FEEL ANYONE HAS EXPLOITED OR TAKEN ADVANTAGE OF YOU FINANCIALLY OR OF YOUR PERSONAL PROPERTY?: NO

## 2024-08-09 SDOH — ECONOMIC STABILITY: FOOD INSECURITY: HOW HARD IS IT FOR YOU TO PAY FOR THE VERY BASICS LIKE FOOD, HOUSING, MEDICAL CARE, AND HEATING?: NOT HARD AT ALL

## 2024-08-09 SDOH — SOCIAL STABILITY: SOCIAL INSECURITY: ARE YOU OR HAVE YOU BEEN THREATENED OR ABUSED PHYSICALLY, EMOTIONALLY, OR SEXUALLY BY ANYONE?: NO

## 2024-08-09 ASSESSMENT — COGNITIVE AND FUNCTIONAL STATUS - GENERAL
DAILY ACTIVITIY SCORE: 24
MOBILITY SCORE: 24
PATIENT BASELINE BEDBOUND: NO

## 2024-08-09 ASSESSMENT — ACTIVITIES OF DAILY LIVING (ADL)
HEARING - LEFT EAR: FUNCTIONAL
DRESSING YOURSELF: INDEPENDENT
TOILETING: INDEPENDENT
LACK_OF_TRANSPORTATION: NO
BATHING: INDEPENDENT
JUDGMENT_ADEQUATE_SAFELY_COMPLETE_DAILY_ACTIVITIES: YES
HEARING - RIGHT EAR: FUNCTIONAL
GROOMING: INDEPENDENT
PATIENT'S MEMORY ADEQUATE TO SAFELY COMPLETE DAILY ACTIVITIES?: YES
FEEDING YOURSELF: INDEPENDENT
LACK_OF_TRANSPORTATION: NO
ADEQUATE_TO_COMPLETE_ADL: YES
WALKS IN HOME: INDEPENDENT

## 2024-08-09 ASSESSMENT — PAIN SCALES - GENERAL
PAINLEVEL_OUTOF10: 0 - NO PAIN

## 2024-08-09 ASSESSMENT — PAIN - FUNCTIONAL ASSESSMENT
PAIN_FUNCTIONAL_ASSESSMENT: 0-10
PAIN_FUNCTIONAL_ASSESSMENT: 0-10

## 2024-08-09 ASSESSMENT — LIFESTYLE VARIABLES
HOW MANY STANDARD DRINKS CONTAINING ALCOHOL DO YOU HAVE ON A TYPICAL DAY: PATIENT DOES NOT DRINK
HAVE YOU EVER FELT YOU SHOULD CUT DOWN ON YOUR DRINKING: NO
HAVE PEOPLE ANNOYED YOU BY CRITICIZING YOUR DRINKING: NO
HOW OFTEN DO YOU HAVE 6 OR MORE DRINKS ON ONE OCCASION: NEVER
EVER HAD A DRINK FIRST THING IN THE MORNING TO STEADY YOUR NERVES TO GET RID OF A HANGOVER: NO
EVER FELT BAD OR GUILTY ABOUT YOUR DRINKING: NO
AUDIT-C TOTAL SCORE: 0
SKIP TO QUESTIONS 9-10: 1
AUDIT-C TOTAL SCORE: 0
TOTAL SCORE: 0
HOW OFTEN DO YOU HAVE A DRINK CONTAINING ALCOHOL: NEVER

## 2024-08-09 ASSESSMENT — PATIENT HEALTH QUESTIONNAIRE - PHQ9
SUM OF ALL RESPONSES TO PHQ9 QUESTIONS 1 & 2: 0
1. LITTLE INTEREST OR PLEASURE IN DOING THINGS: NOT AT ALL
2. FEELING DOWN, DEPRESSED OR HOPELESS: NOT AT ALL

## 2024-08-09 NOTE — H&P
History Of Present Illness  Jono Lott is a 28 y.o. male with past medical history of asthma who presented from home with chief complaint of shortness of breath.  Patient was yesterday seen at [place] emergency room for an asthma exacerbation, he was discharged home on prednisone.   He was Dx with strep throat last week started on azithromycin. Patient states his asthma symptoms have worsened and he has been using his albuterol rescue inhaler several times with no relief, states 20 times of his rescue inhaler yesterday.  Patient came into the emergency room, has received 3 DuoNeb breathing treatments, IV magnesium and IV Solu-Medrol.  Vitals are stable, he is afebrile.  Patient is 98% on room air.  Overall basic labs are unremarkable, he does have a leukocytosis of 15.8.  Chest x-ray showed no acute process.  Patient will be admitted for asthma exacerbation. He does not have a pulmonologist and is requesting to see one. COVID/FLU negative.      Past Medical History  He has a past medical history of Asthma ([facility]), Difficulty breathing (06/26/2024), Disease due to severe acute respiratory syndrome coronavirus 2 (SARS-CoV-2) (06/26/2024), Fungal infection (06/26/2024), Headache (06/26/2024), Infection due to fungus (06/26/2024), Lateral epicondylitis (06/26/2024), Personal history of other diseases of the respiratory system (09/03/2021), and Vomiting and diarrhea (06/26/2024).    Surgical History  He has no past surgical history on file.     Social History  He reports that he has been smoking cigarettes. He started smoking about 17 years ago. He has a 5.8 pack-year smoking history. He has never used smokeless tobacco. No history on file for alcohol use and drug use.    Family History  No family history on file.     Allergies  Amoxicillin, Aspirin, Nsaids (non-steroidal anti-inflammatory drug), and Penicillins    Review of Systems   ROS: 12 systems reviewed and negative except per HPI above     Physical Exam by  "System:     Constitutional: Well developed, awake/alert/oriented x3, no distress, alert and cooperative   ENMT: mucous membranes moist   Head/Neck: Neck supple   Respiratory/Thorax: On room air, diffuse wheezing bilaterally throughout   Cardiovascular: Regular, rate and rhythm, no murmurs, 2+ equal pulses of the extremities, normal S 1and S 2   Gastrointestinal: Nondistended, soft, non-tender, no rebound tenderness or guarding, no masses palpable, no organomegaly, +BS, no bruits   Musculoskeletal: ROM intact, no joint swelling, normal strength   Extremities: normal extremities, no cyanosis edema, contusions or wounds, no clubbing   Neurological: alert and oriented x3       Last Recorded Vitals  Blood pressure 163/68, pulse 69, temperature 36 °C (96.8 °F), temperature source Temporal, resp. rate 18, height 1.778 m (5' 10\"), weight 102 kg (225 lb), SpO2 98%.    Relevant Results  Results for orders placed or performed during the hospital encounter of 08/09/24 (from the past 24 hour(s))   CBC and Auto Differential   Result Value Ref Range    WBC 15.8 (H) 4.4 - 11.3 x10*3/uL    nRBC 0.0 0.0 - 0.0 /100 WBCs    RBC 5.09 4.50 - 5.90 x10*6/uL    Hemoglobin 15.3 13.5 - 17.5 g/dL    Hematocrit 44.6 41.0 - 52.0 %    MCV 88 80 - 100 fL    MCH 30.1 26.0 - 34.0 pg    MCHC 34.3 32.0 - 36.0 g/dL    RDW 12.7 11.5 - 14.5 %    Platelets 207 150 - 450 x10*3/uL    Neutrophils % 75.9 40.0 - 80.0 %    Immature Granulocytes %, Automated 0.6 0.0 - 0.9 %    Lymphocytes % 16.7 13.0 - 44.0 %    Monocytes % 6.6 2.0 - 10.0 %    Eosinophils % 0.1 0.0 - 6.0 %    Basophils % 0.1 0.0 - 2.0 %    Neutrophils Absolute 12.02 (H) 1.20 - 7.70 x10*3/uL    Immature Granulocytes Absolute, Automated 0.09 0.00 - 0.70 x10*3/uL    Lymphocytes Absolute 2.64 1.20 - 4.80 x10*3/uL    Monocytes Absolute 1.04 (H) 0.10 - 1.00 x10*3/uL    Eosinophils Absolute 0.02 0.00 - 0.70 x10*3/uL    Basophils Absolute 0.01 0.00 - 0.10 x10*3/uL   Comprehensive Metabolic Panel "   Result Value Ref Range    Glucose 192 (H) 74 - 99 mg/dL    Sodium 137 136 - 145 mmol/L    Potassium 3.9 3.5 - 5.3 mmol/L    Chloride 102 98 - 107 mmol/L    Bicarbonate 24 21 - 32 mmol/L    Anion Gap 15 10 - 20 mmol/L    Urea Nitrogen 14 6 - 23 mg/dL    Creatinine 0.93 0.50 - 1.30 mg/dL    eGFR >90 >60 mL/min/1.73m*2    Calcium 10.6 (H) 8.6 - 10.3 mg/dL    Albumin 4.5 3.4 - 5.0 g/dL    Alkaline Phosphatase 55 33 - 120 U/L    Total Protein 7.0 6.4 - 8.2 g/dL    AST 17 9 - 39 U/L    Bilirubin, Total 0.3 0.0 - 1.2 mg/dL    ALT 33 10 - 52 U/L   Magnesium   Result Value Ref Range    Magnesium 1.82 1.60 - 2.40 mg/dL   Troponin I, High Sensitivity, Initial   Result Value Ref Range    Troponin I, High Sensitivity 9 0 - 20 ng/L   Light Blue Top   Result Value Ref Range    Extra Tube Hold for add-ons.    BLOOD GAS VENOUS FULL PANEL   Result Value Ref Range    POCT pH, Venous 7.45 (H) 7.33 - 7.43 pH    POCT pCO2, Venous 33 (L) 41 - 51 mm Hg    POCT pO2, Venous 72 (H) 35 - 45 mm Hg    POCT SO2, Venous 97 (H) 45 - 75 %    POCT Oxy Hemoglobin, Venous 89.7 (H) 45.0 - 75.0 %    POCT Hematocrit Calculated, Venous 46.0 41.0 - 52.0 %    POCT Sodium, Venous 137 136 - 145 mmol/L    POCT Potassium, Venous 4.2 3.5 - 5.3 mmol/L    POCT Chloride, Venous 102 98 - 107 mmol/L    POCT Ionized Calicum, Venous 1.34 (H) 1.10 - 1.33 mmol/L    POCT Glucose, Venous 204 (H) 74 - 99 mg/dL    POCT Lactate, Venous 3.8 (H) 0.4 - 2.0 mmol/L    POCT Base Excess, Venous -0.3 -2.0 - 3.0 mmol/L    POCT HCO3 Calculated, Venous 22.9 22.0 - 26.0 mmol/L    POCT Hemoglobin, Venous 15.3 13.5 - 17.5 g/dL    POCT Anion Gap, Venous 16.0 10.0 - 25.0 mmol/L    Patient Temperature      FiO2 21 %   Troponin, High Sensitivity, 1 Hour   Result Value Ref Range    Troponin I, High Sensitivity 8 0 - 20 ng/L   Blood Gas Lactic Acid, Venous   Result Value Ref Range    POCT Lactate, Venous 2.2 (H) 0.4 - 2.0 mmol/L      Scheduled medications  methylPREDNISolone sodium succinate  (PF), 125 mg, intravenous, Once  nicotine, 1 patch, transdermal, Daily      Continuous medications     PRN medications            Assessment/Plan   Principal Problem:    Mild intermittent asthma with exacerbation ([facility])  Asthma exacerbation  Acute respiratory failure secondary to #1  Leukocytosis suspect reactive secondary to steroids    Plan:  Admit to F OBS  Full code  Vitals per protocol  Continue albuterol QID  Solumed 40 IVP TID  Covid/flu negative  Consult pulm per patient request   Home meds ordered  Incentive spirometry  AM labs  Supportive care  Will follow patient closely           I spent 45 minutes in the professional and overall care of this patient.    SIGNATURE: Kamilah Moses DO PATIENT NAME: Jono Lott   DATE: August 9, 2024 MRN: [id number]   TIME: 4:09 PM

## 2024-08-09 NOTE — ED PROVIDER NOTES
Emergency Department Provider Note        History of Present Illness     History provided by: Patient  Limitations to History: None  External Records Reviewed with Brief Summary: None    HPI:  Jono Lott is a 28 y.o. male who presents to the [place] emergency department by walk-in with a chief complaint of worsening shortness of breath refractory to antibiotics and asthma medication.  Patient has been hospitalized for an asthma exacerbation before.  Patient had vomiting and diarrhea on Sunday and on Tuesday was diagnosed with strep throat and given azithromycin.  Yesterday, Thursday, August 8, the patient went to [facility] emergency department and was diagnosed with an asthma with acute exacerbation.  Currently the patient has no chest pain, abdominal pain, or pain in extremities but he says that he has a slight headache and blurry vision.    Physical Exam   Triage vitals:  T 36 °C (96.8 °F)  HR 98  BP (!) 134/91  RR (!) 22  O2 98 % None (Room air)    Physical Exam  Vitals and nursing note reviewed.   Constitutional:       General: He is not in acute distress.     Appearance: He is well-developed.   HENT:      Head: Normocephalic and atraumatic.   Eyes:      Conjunctiva/sclera: Conjunctivae normal.      Pupils: Pupils are equal, round, and reactive to light.   Cardiovascular:      Rate and Rhythm: Normal rate and regular rhythm.      Heart sounds: No murmur heard.  Pulmonary:      Effort: Pulmonary effort is normal. No respiratory distress.      Breath sounds: Examination of the right-upper field reveals wheezing. Examination of the left-upper field reveals wheezing. Examination of the right-middle field reveals wheezing. Examination of the left-middle field reveals wheezing. Examination of the right-lower field reveals wheezing. Examination of the left-lower field reveals wheezing. Wheezing present.      Comments: Patient's voice was altered due to his breathing patterns and wheezes, his voice  had a more raspy tone to it  Chest:      Chest wall: No tenderness or edema.   Abdominal:      Palpations: Abdomen is soft.      Tenderness: There is no abdominal tenderness.   Musculoskeletal:         General: No swelling.      Cervical back: Neck supple.   Skin:     General: Skin is warm and dry.      Capillary Refill: Capillary refill takes less than 2 seconds.   Neurological:      Mental Status: He is alert.   Psychiatric:         Mood and Affect: Mood normal.          Medical Decision Making & ED Course   Medical Decision Makin y.o. male presenting to the [place] emergency department with an acute exacerbation of asthma after experiencing an acute exacerbation of asthma yesterday as well.  Patient was treated with 3 different treatments of DuoNebs.    Labs ordered: VBG, VBG lactate, troponin, magnesium, CMP, CBC    Lab findings: Patient had elevated leukocytes that are due to the steroids he had been taken earlier this morning.  Calcium was mildly elevated.  Patient had a lactate in the VBG of 2.2 as well as pH in the VBG of 7.45 these are both mildly elevated.    Imaging ordered: Chest x-ray and EKG.    Imaging findings: Chest x-ray showed no acute cardiopulmonary process.  EKG is as shown below.    Clinical impression: The patient showed up and was treated for his asthma exacerbation, I listened multiple times to his lungs and the wheezes improved, after the DuoNebs the wheezes were no longer present.  The same thing happened to the patient and the [facility] yesterday as well and he was coming [place] [facility] [place] because his asthma had another exacerbation.  The patient was fairly hesitant to leave due to the fact that his asthma was coming back so easily over the previous 2 days.  The patient ended up having a coughing fit while in his room as he was about to be discharged and this caused another asthma exacerbation.  Before the patient was about to be discharged he was  found to have developed wheezes again in his lungs.    Differential diagnosis: Acute on chronic exacerbation of asthma.    Diagnostic decisions: Shared decision making was made between the patient and myself and the attending physician with how the patient will treat his acute on chronic exacerbation of asthma.  The patient could treat the exacerbation on a outpatient basis with a higher dose of prednisone and initially we are going to do this until the patient had a that coughing fit and he ended up having developed wheezes again.  The DuoNebs on the round with low-dose steroids, approximately 20 mg of prednisone, were not enough to help the patient have developed new asthma symptoms.    Treatment decisions: Patient was given 2 g of mag sulfate, 3 different treatments of DuoNebs, and Solu-Medrol at 125 mg.  Solu-Medrol was given before he was admitted upstairs.    Disposition: The patient was admitted upstairs due to the fact that his exacerbations of asthma were not staying resolved, they were resolving with the wheezes and coming back the next time he had a coughing fit.    Reassessment and response to treatment: Initially the patient responded well to the treatment with the wheezes in the lungs going away and him feeling better, the patient then had a coughing fit and developed wheezing again.  This happened between yesterday and this morning as well because he was treated at [place] and his symptoms were gone, then his symptoms redeveloped and came back and the patient presented here to [place] emergency department.    ----      Differential diagnoses considered include but are not limited to: Acute on chronic exacerbation of an asthma attack     Social Determinants of Health which Significantly Impact Care: None identified     EKG Independent Interpretation: The EKG obtained at 12:37 PM on August 9, 2024.  Was independently interpreted by myself. It demonstrates normal sinus rhythm rhythm with a ventricular  rate of between 70 and 80 bpm.  Normal axis. Intervals QT segments of 423 ms and a CO interval between 140 and 180 ms. ST segments showed no elevation or depression.  Normal compared to prior EKG from 11:45 AM on August 8, 2024    Independent Result Review and Interpretation: Chest X-Ray as interpreted by me revealed no acute cardiopulmonary pathology or osseous fractures.    Chronic conditions affecting the patient's care: As documented above in [facility]    The patient was discussed with the following consultants/services: Hospitalist/Admitting Provider who accepted the patient for admission    Care Considerations: As documented above in [facility]    ED Course:  ED Course as of 08/09/24 1900   Fri Aug 09, 2024   1144 BLOOD GAS VENOUS FULL PANEL(!)  Patient has elevated pH of the blood at 7.45 with bicarb 22.9. [SNEHAL]   1145 CBC and Auto Differential(!)  Patient's leukocyte count is at 15.8 thousand, this is elevated. [SNEHAL]   1212 Blood Gas Lactic Acid, Venous(!)  Blood gas lactic acid is mildly elevated. [SNEHAL]   1501 Patient had a coughing fit and room 7, coughing fit exacerbated more wheezing that was detectable on stethoscope.  Patient will be treated with DuoNebs and admitted.  The patient was going to be discharged before the coughing fit, patient had no wheezing but this has changed. [SNEHAL]      ED Course User Index  [SNEHAL] Sincere Velasquez DO         Diagnoses as of 08/09/24 1900   Mild intermittent asthma with exacerbation ([facility])   Shortness of breath     Disposition   As a result of their workup, the patient will require admission to the hospital.  The patient was informed of his diagnosis.  The patient was given the opportunity to ask questions and I answered them. The patient agreed to be admitted to the hospital.    Procedures   Procedures    Patient seen and discussed with ED attending physician.    Sincere Velasquez DO  Emergency Medicine     Sincere Velasquez DO  Resident  08/09/24 1917

## 2024-08-10 LAB
ANION GAP BLDA CALCULATED.4IONS-SCNC: 13 MMO/L (ref 10–25)
ANION GAP SERPL CALC-SCNC: 16 MMOL/L (ref 10–20)
APPARATUS: ABNORMAL
ATRIAL RATE: 77 BPM
ATRIAL RATE: 93 BPM
BASE EXCESS BLDA CALC-SCNC: 0.2 MMOL/L (ref -2–3)
BODY TEMPERATURE: ABNORMAL
BUN SERPL-MCNC: 21 MG/DL (ref 6–23)
CA-I BLDA-SCNC: 1.17 MMOL/L (ref 1.1–1.33)
CALCIUM SERPL-MCNC: 9.2 MG/DL (ref 8.6–10.3)
CHLORIDE BLDA-SCNC: 99 MMOL/L (ref 98–107)
CHLORIDE SERPL-SCNC: 100 MMOL/L (ref 98–107)
CO2 SERPL-SCNC: 24 MMOL/L (ref 21–32)
CREAT SERPL-MCNC: 1.02 MG/DL (ref 0.5–1.3)
EGFRCR SERPLBLD CKD-EPI 2021: >90 ML/MIN/1.73M*2
ERYTHROCYTE [DISTWIDTH] IN BLOOD BY AUTOMATED COUNT: 12.6 % (ref 11.5–14.5)
EST. AVERAGE GLUCOSE BLD GHB EST-MCNC: 111 MG/DL
FLOW: 2 LPM
GLUCOSE BLDA-MCNC: 249 MG/DL (ref 74–99)
GLUCOSE SERPL-MCNC: 197 MG/DL (ref 74–99)
HBA1C MFR BLD: 5.5 %
HCO3 BLDA-SCNC: 23.8 MMOL/L (ref 22–26)
HCT VFR BLD AUTO: 44 % (ref 41–52)
HCT VFR BLD EST: 47 % (ref 41–52)
HGB BLD-MCNC: 14.9 G/DL (ref 13.5–17.5)
HGB BLDA-MCNC: 15.5 G/DL (ref 13.5–17.5)
INHALED O2 CONCENTRATION: 28 %
LACTATE BLDA-SCNC: 3.7 MMOL/L (ref 0.4–2)
MCH RBC QN AUTO: 29.8 PG (ref 26–34)
MCHC RBC AUTO-ENTMCNC: 33.9 G/DL (ref 32–36)
MCV RBC AUTO: 88 FL (ref 80–100)
NRBC BLD-RTO: 0 /100 WBCS (ref 0–0)
OXYHGB MFR BLDA: 95.7 % (ref 94–98)
P AXIS: 52 DEGREES
P AXIS: 56 DEGREES
P OFFSET: 180 MS
P OFFSET: 181 MS
P ONSET: 134 MS
P ONSET: 134 MS
PCO2 BLDA: 35 MM HG (ref 38–42)
PH BLDA: 7.44 PH (ref 7.38–7.42)
PLATELET # BLD AUTO: 184 X10*3/UL (ref 150–450)
PO2 BLDA: 85 MM HG (ref 85–95)
POTASSIUM BLDA-SCNC: 4.3 MMOL/L (ref 3.5–5.3)
POTASSIUM SERPL-SCNC: 4.2 MMOL/L (ref 3.5–5.3)
PR INTERVAL: 160 MS
PR INTERVAL: 162 MS
Q ONSET: 214 MS
Q ONSET: 215 MS
QRS COUNT: 13 BEATS
QRS COUNT: 15 BEATS
QRS DURATION: 102 MS
QRS DURATION: 98 MS
QT INTERVAL: 368 MS
QT INTERVAL: 374 MS
QTC CALCULATION(BAZETT): 423 MS
QTC CALCULATION(BAZETT): 457 MS
QTC FREDERICIA: 406 MS
QTC FREDERICIA: 426 MS
R AXIS: 57 DEGREES
R AXIS: 58 DEGREES
RBC # BLD AUTO: 5 X10*6/UL (ref 4.5–5.9)
SAO2 % BLDA: 99 % (ref 94–100)
SODIUM BLDA-SCNC: 131 MMOL/L (ref 136–145)
SODIUM SERPL-SCNC: 136 MMOL/L (ref 136–145)
T AXIS: 49 DEGREES
T AXIS: 53 DEGREES
T OFFSET: 398 MS
T OFFSET: 402 MS
VENTRICULAR RATE: 77 BPM
VENTRICULAR RATE: 93 BPM
WBC # BLD AUTO: 14.1 X10*3/UL (ref 4.4–11.3)

## 2024-08-10 PROCEDURE — 85027 COMPLETE CBC AUTOMATED: CPT | Performed by: INTERNAL MEDICINE

## 2024-08-10 PROCEDURE — 83036 HEMOGLOBIN GLYCOSYLATED A1C: CPT | Mod: STJLAB | Performed by: INTERNAL MEDICINE

## 2024-08-10 PROCEDURE — 2500000002 HC RX 250 W HCPCS SELF ADMINISTERED DRUGS (ALT 637 FOR MEDICARE OP, ALT 636 FOR OP/ED)

## 2024-08-10 PROCEDURE — 2500000002 HC RX 250 W HCPCS SELF ADMINISTERED DRUGS (ALT 637 FOR MEDICARE OP, ALT 636 FOR OP/ED): Performed by: INTERNAL MEDICINE

## 2024-08-10 PROCEDURE — 2500000005 HC RX 250 GENERAL PHARMACY W/O HCPCS: Performed by: INTERNAL MEDICINE

## 2024-08-10 PROCEDURE — 96372 THER/PROPH/DIAG INJ SC/IM: CPT | Performed by: INTERNAL MEDICINE

## 2024-08-10 PROCEDURE — 2500000001 HC RX 250 WO HCPCS SELF ADMINISTERED DRUGS (ALT 637 FOR MEDICARE OP): Performed by: INTERNAL MEDICINE

## 2024-08-10 PROCEDURE — 2500000004 HC RX 250 GENERAL PHARMACY W/ HCPCS (ALT 636 FOR OP/ED)

## 2024-08-10 PROCEDURE — S4991 NICOTINE PATCH NONLEGEND: HCPCS

## 2024-08-10 PROCEDURE — G0378 HOSPITAL OBSERVATION PER HR: HCPCS

## 2024-08-10 PROCEDURE — 94640 AIRWAY INHALATION TREATMENT: CPT

## 2024-08-10 PROCEDURE — 2500000004 HC RX 250 GENERAL PHARMACY W/ HCPCS (ALT 636 FOR OP/ED): Performed by: INTERNAL MEDICINE

## 2024-08-10 PROCEDURE — 82805 BLOOD GASES W/O2 SATURATION: CPT

## 2024-08-10 PROCEDURE — 36600 WITHDRAWAL OF ARTERIAL BLOOD: CPT

## 2024-08-10 PROCEDURE — 36415 COLL VENOUS BLD VENIPUNCTURE: CPT | Performed by: INTERNAL MEDICINE

## 2024-08-10 PROCEDURE — 84132 ASSAY OF SERUM POTASSIUM: CPT | Performed by: INTERNAL MEDICINE

## 2024-08-10 PROCEDURE — 99233 SBSQ HOSP IP/OBS HIGH 50: CPT | Performed by: INTERNAL MEDICINE

## 2024-08-10 PROCEDURE — 99222 1ST HOSP IP/OBS MODERATE 55: CPT | Performed by: INTERNAL MEDICINE

## 2024-08-10 RX ORDER — BUDESONIDE 0.25 MG/2ML
0.25 INHALANT ORAL
Status: DISCONTINUED | OUTPATIENT
Start: 2024-08-10 | End: 2024-08-11

## 2024-08-10 RX ORDER — IPRATROPIUM BROMIDE AND ALBUTEROL SULFATE 2.5; .5 MG/3ML; MG/3ML
9 SOLUTION RESPIRATORY (INHALATION) ONCE
Status: COMPLETED | OUTPATIENT
Start: 2024-08-10 | End: 2024-08-10

## 2024-08-10 RX ORDER — IPRATROPIUM BROMIDE AND ALBUTEROL SULFATE 2.5; .5 MG/3ML; MG/3ML
3 SOLUTION RESPIRATORY (INHALATION) EVERY 2 HOUR PRN
Status: DISCONTINUED | OUTPATIENT
Start: 2024-08-10 | End: 2024-08-17 | Stop reason: HOSPADM

## 2024-08-10 RX ORDER — IPRATROPIUM BROMIDE AND ALBUTEROL SULFATE 2.5; .5 MG/3ML; MG/3ML
3 SOLUTION RESPIRATORY (INHALATION)
Status: DISCONTINUED | OUTPATIENT
Start: 2024-08-10 | End: 2024-08-10

## 2024-08-10 RX ORDER — IPRATROPIUM BROMIDE AND ALBUTEROL SULFATE 2.5; .5 MG/3ML; MG/3ML
3 SOLUTION RESPIRATORY (INHALATION)
Status: DISCONTINUED | OUTPATIENT
Start: 2024-08-10 | End: 2024-08-17 | Stop reason: HOSPADM

## 2024-08-10 RX ADMIN — NICOTINE 1 PATCH: 21 PATCH, EXTENDED RELEASE TRANSDERMAL at 08:05

## 2024-08-10 RX ADMIN — IPRATROPIUM BROMIDE AND ALBUTEROL SULFATE 9 ML: 2.5; .5 SOLUTION RESPIRATORY (INHALATION) at 05:13

## 2024-08-10 RX ADMIN — IPRATROPIUM BROMIDE AND ALBUTEROL SULFATE 3 ML: 2.5; .5 SOLUTION RESPIRATORY (INHALATION) at 16:00

## 2024-08-10 RX ADMIN — BUDESONIDE 0.25 MG: 0.25 INHALANT RESPIRATORY (INHALATION) at 07:40

## 2024-08-10 RX ADMIN — BUPROPION HYDROCHLORIDE 150 MG: 150 TABLET, FILM COATED, EXTENDED RELEASE ORAL at 20:38

## 2024-08-10 RX ADMIN — Medication 2 L/MIN: at 05:13

## 2024-08-10 RX ADMIN — ALBUTEROL SULFATE 2.5 MG: 2.5 SOLUTION RESPIRATORY (INHALATION) at 04:06

## 2024-08-10 RX ADMIN — IPRATROPIUM BROMIDE AND ALBUTEROL SULFATE 3 ML: 2.5; .5 SOLUTION RESPIRATORY (INHALATION) at 21:33

## 2024-08-10 RX ADMIN — IPRATROPIUM BROMIDE AND ALBUTEROL SULFATE 3 ML: 2.5; .5 SOLUTION RESPIRATORY (INHALATION) at 12:03

## 2024-08-10 RX ADMIN — SODIUM CHLORIDE, POTASSIUM CHLORIDE, SODIUM LACTATE AND CALCIUM CHLORIDE 500 ML: 600; 310; 30; 20 INJECTION, SOLUTION INTRAVENOUS at 08:04

## 2024-08-10 RX ADMIN — BUPROPION HYDROCHLORIDE 150 MG: 150 TABLET, FILM COATED, EXTENDED RELEASE ORAL at 08:04

## 2024-08-10 RX ADMIN — Medication 4 L/MIN: at 20:48

## 2024-08-10 RX ADMIN — FLUTICASONE PROPIONATE 1 SPRAY: 50 SPRAY, METERED NASAL at 08:03

## 2024-08-10 RX ADMIN — METHYLPREDNISOLONE SODIUM SUCCINATE 40 MG: 40 INJECTION, POWDER, FOR SOLUTION INTRAMUSCULAR; INTRAVENOUS at 05:14

## 2024-08-10 RX ADMIN — Medication 2 L/MIN: at 04:25

## 2024-08-10 RX ADMIN — IPRATROPIUM BROMIDE AND ALBUTEROL SULFATE 3 ML: 2.5; .5 SOLUTION RESPIRATORY (INHALATION) at 20:45

## 2024-08-10 RX ADMIN — AZITHROMYCIN DIHYDRATE 500 MG: 500 TABLET ORAL at 08:04

## 2024-08-10 RX ADMIN — Medication 4 PERCENT: at 12:05

## 2024-08-10 RX ADMIN — ENOXAPARIN SODIUM 40 MG: 40 INJECTION SUBCUTANEOUS at 16:45

## 2024-08-10 RX ADMIN — IPRATROPIUM BROMIDE AND ALBUTEROL SULFATE 3 ML: 2.5; .5 SOLUTION RESPIRATORY (INHALATION) at 07:40

## 2024-08-10 RX ADMIN — Medication 4 L/MIN: at 07:42

## 2024-08-10 RX ADMIN — METHYLPREDNISOLONE SODIUM SUCCINATE 40 MG: 40 INJECTION, POWDER, FOR SOLUTION INTRAMUSCULAR; INTRAVENOUS at 23:15

## 2024-08-10 RX ADMIN — METHYLPREDNISOLONE SODIUM SUCCINATE 40 MG: 40 INJECTION, POWDER, FOR SOLUTION INTRAMUSCULAR; INTRAVENOUS at 14:13

## 2024-08-10 RX ADMIN — Medication 4 L/MIN: at 16:03

## 2024-08-10 SDOH — ECONOMIC STABILITY: HOUSING INSECURITY: IN THE PAST 12 MONTHS, HOW MANY TIMES HAVE YOU MOVED WHERE YOU WERE LIVING?: 1

## 2024-08-10 ASSESSMENT — COGNITIVE AND FUNCTIONAL STATUS - GENERAL
DAILY ACTIVITIY SCORE: 24
MOBILITY SCORE: 24

## 2024-08-10 ASSESSMENT — PAIN - FUNCTIONAL ASSESSMENT: PAIN_FUNCTIONAL_ASSESSMENT: 0-10

## 2024-08-10 ASSESSMENT — ENCOUNTER SYMPTOMS
SHORTNESS OF BREATH: 1
FEVER: 0
ABDOMINAL PAIN: 0
FATIGUE: 1
COUGH: 1
CHEST TIGHTNESS: 1
PALPITATIONS: 0
CHILLS: 0
WHEEZING: 1

## 2024-08-10 ASSESSMENT — PAIN SCALES - GENERAL
PAINLEVEL_OUTOF10: 0 - NO PAIN
PAINLEVEL_OUTOF10: 0 - NO PAIN

## 2024-08-10 NOTE — SIGNIFICANT EVENT
Worsening respiratory status    28 YOM admitted to [facility] yesterday AM for mild asthma exacerbation.  Was started on appropriate therapy for exacerbation.  States he is an active smoker and he is only currently on a rescue albuterol inhaler at home.    Notified at 04:58 that respiratory status is worsening, he is s/p breathing tx and has new expiratory wheezing and also requiring 2 L nasal cannula.  He also is verbally stating breathing is worsening.  I immediately assessed him and noted inspiratory wheezing throughout bilateral lungs as as well as decreased breath sounds especially at the bases.  He currently is sitting up in his bed without respiratory distress, not actively tripoding, but does look ill.  He has history of 1 asthma exacerbation requiring hospitalization as a teenager, active smoker and denies ever being intubated for an asthma exacerbation.  Rediscussed possibility of intubation if this continues to worsen which she is okay with.  Ordered continuous DuoNeb of 9 mL, getting his IV Solu-Medrol 40 mg dose early, and also ordered scheduled Pulmicort nebulizer.  Stat ABG pending. will reassess in 1/2-hour if continues to worsen we will discuss ICU about escalating level of care.    Valente Arcos DO

## 2024-08-10 NOTE — CARE PLAN
Problem: Neurosensory - Adult  Goal: Achieves stable or improved neurological status  Outcome: Progressing  Goal: Absence of seizures  Outcome: Progressing  Goal: Remains free of injury related to seizures activity  Outcome: Progressing  Goal: Achieves maximal functionality and self care  Outcome: Progressing     Problem: Cardiovascular - Adult  Goal: Maintains optimal cardiac output and hemodynamic stability  Outcome: Progressing  Goal: Absence of cardiac dysrhythmias or at baseline  Outcome: Progressing     Problem: Respiratory - Adult  Goal: Achieves optimal ventilation and oxygenation  Outcome: Progressing     Problem: Hematologic - Adult  Goal: Maintains hematologic stability  Outcome: Progressing   The patient's goals for the shift include  breathe better    The clinical goals for the shift include  maintain improved work of breathing

## 2024-08-10 NOTE — PROGRESS NOTES
Jono Lott is a 28 y.o. male on day 0 of admission presenting with Mild intermittent asthma with exacerbation ([facility]).    Subjective   Patient seen and examined, please see clinical event note regarding overnight issues with his breathing.  Patient states that he feels like his breathing is worse today.  He has received approximately 5-6 breathing treatments overnight. Yesterday he said he used his albuterol rescue inhaler 20 times prior to coming in.    I immediately assessed him this morning.  He is currently on 4 L nasal cannula.  He has diffuse wheezing bilaterally.  I did consult pulmonology yesterday and reached out to Dr. Stanton this morning regarding my concerns for the patient.  He is already receiving adequate breathing treatments along with IV steroids.  Spoke with the patient and told him to focus on relaxing is much as possible however he does not currently feel anxious he says.  He denies chest pain, abdominal pain, nausea, vomiting or diarrhea.  He states that he smokes approximately 1 pack every 2 to 3 days.         Objective     Physical Exam  Vitals reviewed.   Constitutional:       Appearance: Normal appearance.   HENT:      Head: Normocephalic and atraumatic.      Nose: Nose normal.   Cardiovascular:      Rate and Rhythm: Normal rate and regular rhythm.      Pulses: Normal pulses.      Heart sounds: Normal heart sounds.   Pulmonary:      Breath sounds: Wheezing present. No rales.      Comments: On 4 L nasal cannula, diffuse wheezing bilaterally on inspiration expiration  Abdominal:      General: Abdomen is flat. Bowel sounds are normal.      Palpations: Abdomen is soft.      Tenderness: There is no abdominal tenderness.   Skin:     General: Skin is warm and dry.   Neurological:      Mental Status: He is alert and oriented to person, place, and time. Mental status is at baseline.   Psychiatric:         Mood and Affect: Mood normal.         Last Recorded Vitals  Blood pressure 133/63, pulse  "93, temperature 36 °C (96.8 °F), temperature source Temporal, resp. rate 14, height 1.778 m (5' 10\"), weight 102 kg (225 lb), SpO2 98%.  Intake/Output last 3 Shifts:  I/O last 3 completed shifts:  In: 960 (9.4 mL/kg) [P.O.:960]  Out: - (0 mL/kg)   Weight: 102.1 kg     Relevant Results  Scheduled medications  azithromycin, 500 mg, oral, Daily  budesonide, 0.25 mg, nebulization, Daily  buPROPion XL, 150 mg, oral, BID  enoxaparin, 40 mg, subcutaneous, q24h  fluticasone, 1 spray, Each Nostril, Daily  ipratropium-albuteroL, 3 mL, nebulization, q4h  methylPREDNISolone sodium succinate (PF), 40 mg, intravenous, q8h  nicotine, 1 patch, transdermal, Daily  polyethylene glycol, 17 g, oral, Daily      Continuous medications     PRN medications  PRN medications: acetaminophen **OR** acetaminophen **OR** acetaminophen, acetaminophen, ipratropium-albuteroL, melatonin, ondansetron **OR** ondansetron, oxygen  Results from last 7 days   Lab Units 08/10/24  0659 08/09/24  1039   WBC AUTO x10*3/uL 14.1* 15.8*   RBC AUTO x10*6/uL 5.00 5.09   HEMOGLOBIN g/dL 14.9 15.3     Results from last 7 days   Lab Units 08/10/24  0659 08/09/24  1039   SODIUM mmol/L 136 137   POTASSIUM mmol/L 4.2 3.9   CHLORIDE mmol/L 100 102   CO2 mmol/L 24 24   BUN mg/dL 21 14   CREATININE mg/dL 1.02 0.93   CALCIUM mg/dL 9.2 10.6*   MAGNESIUM mg/dL  --  1.82   BILIRUBIN TOTAL mg/dL  --  0.3   ALT U/L  --  33   AST U/L  --  17       Assessment/Plan   Principal Problem:    Mild intermittent asthma with exacerbation ([facility])  Asthma exacerbation  Acute hypoxic respiratory failure secondary to severe asthma exacerbation- on 4 LNC  Leukocytosis suspect reactive secondary to steroids  Tobacco abuse      Plan:  Admit to tele  Full code  Vitals per protocol  Continue albuterol QID  Solumed 40 IVP TID  Covid/flu negative  Consult pulm Dr Stanton  [place] meds ordered  Incentive spirometry  Nicotine patch for tobacco abuse  AM labs  Supportive care  Will follow patient " closely         I spent 35 minutes in the professional and overall care of this patient.      Kamilah Moses, DO

## 2024-08-10 NOTE — CONSULTS
Inpatient consult to Pulmonology  Consult performed by: Kayce Stanton MD  Consult ordered by: Kamilah Moses DO          Reason For Consult  Asthma exacerbation    History Of Present Illness  Jono Lott is a 28 y.o. male presenting with PMH asthma, tobacco/THC use admitted for asthma exacerbation.    Mr. Lott reports worsening of dyspnea, tightness, and wheezing while working at his  job. He notes that he had recently been diagnosed with a strep infection as well. He was admitted and on q6 nebs which were changed to q4 after worsening this AM. He has felt better since that change.     He has had asthma since childhood and has been hospitalized before. His exacerbations have taken a week to get better before. He only has albuterol due to cost, he couldn't afford the advair. His triggers are usually exertion and infection. He hasn't had many allergies this year. He has been cutting back on smoking and started vaping but recently stopped.      Past Medical History  Past Medical History:   Diagnosis Date    Asthma ([facility])     Difficulty breathing 06/26/2024    Comment on above: DIFFICULTY BREATHING    Disease due to severe acute respiratory syndrome coronavirus 2 (SARS-CoV-2) 06/26/2024    Fungal infection 06/26/2024    Headache 06/26/2024    Infection due to fungus 06/26/2024    Lateral epicondylitis 06/26/2024    Personal history of other diseases of the respiratory system 09/03/2021    History of asthma    Vomiting and diarrhea 06/26/2024       Surgical History  No past surgical history on file.     Social History  Social History     Tobacco Use    Smoking status: Every Day     Current packs/day: 0.33     Average packs/day: 0.3 packs/day for 17.6 years (5.8 ttl pk-yrs)     Types: Cigarettes     Start date: 2007    Smokeless tobacco: Never   2 ppd-> 1/2 ppd, started as teen  Some vaping  Occasional marijuana    Family History  No family history on file.    Occupational & Environmental History  "  ,     Allergies  Amoxicillin, Aspirin, Nsaids (non-steroidal anti-inflammatory drug), and Penicillins    Review of Systems   Constitutional:  Positive for fatigue. Negative for chills and fever.   Respiratory:  Positive for cough, chest tightness, shortness of breath and wheezing.    Cardiovascular:  Negative for chest pain, palpitations and leg swelling.   Gastrointestinal:  Negative for abdominal pain.   Allergic/Immunologic: Negative for environmental allergies.        Physical Exam  Vitals reviewed.   Constitutional:       Appearance: He is obese.   HENT:      Head: Normocephalic.      Nose: Nose normal.      Mouth/Throat:      Mouth: Mucous membranes are moist.   Eyes:      Extraocular Movements: Extraocular movements intact.   Cardiovascular:      Rate and Rhythm: Normal rate and regular rhythm.      Heart sounds: No murmur heard.  Pulmonary:      Effort: Pulmonary effort is normal.      Breath sounds: Wheezing present.      Comments: Mild wheezing seen after breathing treatment  Neurological:      General: No focal deficit present.      Mental Status: He is alert and oriented to person, place, and time.          Vital Signs  Blood pressure 133/63, pulse 93, temperature 36 °C (96.8 °F), temperature source Temporal, resp. rate 14, height 1.778 m (5' 10\"), weight 102 kg (225 lb), SpO2 98%.  Oxygen Therapy  SpO2: 98 %  Medical Gas Therapy: Supplemental oxygen  O2 Delivery Method: Nasal cannula       Relevant Results  XR chest 1 view 08/09/2024    Narrative  Interpreted By:  Buddy Manning,  STUDY:  XR CHEST 1 VIEW; 8/9/2024 11:00 am    INDICATION:  Signs/Symptoms:sob    COMPARISON:  Radiographs 08/08/2024    ACCESSION NUMBER(S):  [id number]    ORDERING CLINICIAN:  PREETI SOLIS    TECHNIQUE:  Single frontal view of the chest performed.    FINDINGS:  LINES AND DEVICES:  None.    LUNGS:  No focal consolidation, pulmonary edema, pleural effusion or  pneumothorax.    CARDIOMEDIASTINAL " SILHOUETTE:  The cardiomediastinal silhouette is within normal limits.    Impression  No acute pulmonary process.    MACRO  None    Signed by: Buddy Manning 8/9/2024 11:16 AM  Dictation workstation:   [id number]       Assessment/Plan     Mr. Lott is a 28M PMH asthma, tobacco/THC use admitted for asthma exacerbation  Consult for asthma    #Asthma exacerbation  2/2 recent pharyngitis and exertion with high temperatures  Patient should continue steroids and nebulizers as prescribed by primary team  Agree with q4 duonebs. Can add q2PRN albuterol  Continue steroids, taper to come once patient shows improvement  Negative infectious and viral studies so far  Agree with continuing azithromycin  Can add bronchitis coverage if symptoms persist      I spent 45 minutes in the professional and overall care of this patient.      Kayce Stanton MD

## 2024-08-10 NOTE — CARE PLAN
The patient's goals for the shift include    Problem: Cardiovascular - Adult  Goal: Maintains optimal cardiac output and hemodynamic stability  8/10/2024 1029 by Lien Lewis RN  Outcome: Progressing  8/10/2024 1029 by Lien Lewis RN  Outcome: Progressing  Goal: Absence of cardiac dysrhythmias or at baseline  8/10/2024 1029 by Lien Lewis RN  Outcome: Progressing  8/10/2024 1029 by Lien Lewis RN  Outcome: Progressing     Problem: Respiratory - Adult  Goal: Achieves optimal ventilation and oxygenation  8/10/2024 1029 by Lien Lewis RN  Outcome: Progressing  8/10/2024 1029 by Lien Lewis RN  Outcome: Progressing     Problem: Hematologic - Adult  Goal: Maintains hematologic stability  8/10/2024 1029 by Lien Lewis RN  Outcome: Progressing  8/10/2024 1029 by Lien Lewis RN  Outcome: Progressing       The clinical goals for the shift include pt will be free of respiratory distress this shift

## 2024-08-11 ENCOUNTER — APPOINTMENT (OUTPATIENT)
Dept: RADIOLOGY | Facility: HOSPITAL | Age: 29
End: 2024-08-11

## 2024-08-11 VITALS
RESPIRATION RATE: 16 BRPM | HEART RATE: 79 BPM | TEMPERATURE: 98.2 F | WEIGHT: 225 LBS | OXYGEN SATURATION: 95 % | SYSTOLIC BLOOD PRESSURE: 152 MMHG | BODY MASS INDEX: 32.21 KG/M2 | HEIGHT: 70 IN | DIASTOLIC BLOOD PRESSURE: 80 MMHG

## 2024-08-11 LAB
ANION GAP SERPL CALC-SCNC: 15 MMOL/L (ref 10–20)
BUN SERPL-MCNC: 18 MG/DL (ref 6–23)
CALCIUM SERPL-MCNC: 9.3 MG/DL (ref 8.6–10.3)
CHLORIDE SERPL-SCNC: 102 MMOL/L (ref 98–107)
CO2 SERPL-SCNC: 24 MMOL/L (ref 21–32)
CREAT SERPL-MCNC: 0.89 MG/DL (ref 0.5–1.3)
EGFRCR SERPLBLD CKD-EPI 2021: >90 ML/MIN/1.73M*2
ERYTHROCYTE [DISTWIDTH] IN BLOOD BY AUTOMATED COUNT: 12.3 % (ref 11.5–14.5)
GLUCOSE SERPL-MCNC: 215 MG/DL (ref 74–99)
HCT VFR BLD AUTO: 44.4 % (ref 41–52)
HGB BLD-MCNC: 15 G/DL (ref 13.5–17.5)
MCH RBC QN AUTO: 30.1 PG (ref 26–34)
MCHC RBC AUTO-ENTMCNC: 33.8 G/DL (ref 32–36)
MCV RBC AUTO: 89 FL (ref 80–100)
NRBC BLD-RTO: 0 /100 WBCS (ref 0–0)
PLATELET # BLD AUTO: 207 X10*3/UL (ref 150–450)
POTASSIUM SERPL-SCNC: 4.2 MMOL/L (ref 3.5–5.3)
RBC # BLD AUTO: 4.99 X10*6/UL (ref 4.5–5.9)
SODIUM SERPL-SCNC: 137 MMOL/L (ref 136–145)
WBC # BLD AUTO: 17.1 X10*3/UL (ref 4.4–11.3)

## 2024-08-11 PROCEDURE — 2500000002 HC RX 250 W HCPCS SELF ADMINISTERED DRUGS (ALT 637 FOR MEDICARE OP, ALT 636 FOR OP/ED)

## 2024-08-11 PROCEDURE — 2500000004 HC RX 250 GENERAL PHARMACY W/ HCPCS (ALT 636 FOR OP/ED)

## 2024-08-11 PROCEDURE — 94640 AIRWAY INHALATION TREATMENT: CPT

## 2024-08-11 PROCEDURE — 85027 COMPLETE CBC AUTOMATED: CPT | Performed by: INTERNAL MEDICINE

## 2024-08-11 PROCEDURE — 1200000002 HC GENERAL ROOM WITH TELEMETRY DAILY

## 2024-08-11 PROCEDURE — S4991 NICOTINE PATCH NONLEGEND: HCPCS

## 2024-08-11 PROCEDURE — 71045 X-RAY EXAM CHEST 1 VIEW: CPT

## 2024-08-11 PROCEDURE — 99233 SBSQ HOSP IP/OBS HIGH 50: CPT | Performed by: INTERNAL MEDICINE

## 2024-08-11 PROCEDURE — 71045 X-RAY EXAM CHEST 1 VIEW: CPT | Performed by: RADIOLOGY

## 2024-08-11 PROCEDURE — 96372 THER/PROPH/DIAG INJ SC/IM: CPT | Performed by: INTERNAL MEDICINE

## 2024-08-11 PROCEDURE — 80048 BASIC METABOLIC PNL TOTAL CA: CPT | Performed by: INTERNAL MEDICINE

## 2024-08-11 PROCEDURE — 99232 SBSQ HOSP IP/OBS MODERATE 35: CPT | Performed by: INTERNAL MEDICINE

## 2024-08-11 PROCEDURE — 2500000004 HC RX 250 GENERAL PHARMACY W/ HCPCS (ALT 636 FOR OP/ED): Performed by: INTERNAL MEDICINE

## 2024-08-11 PROCEDURE — 2500000001 HC RX 250 WO HCPCS SELF ADMINISTERED DRUGS (ALT 637 FOR MEDICARE OP): Performed by: INTERNAL MEDICINE

## 2024-08-11 PROCEDURE — 2500000005 HC RX 250 GENERAL PHARMACY W/O HCPCS: Performed by: INTERNAL MEDICINE

## 2024-08-11 PROCEDURE — 36415 COLL VENOUS BLD VENIPUNCTURE: CPT | Performed by: INTERNAL MEDICINE

## 2024-08-11 PROCEDURE — 2500000002 HC RX 250 W HCPCS SELF ADMINISTERED DRUGS (ALT 637 FOR MEDICARE OP, ALT 636 FOR OP/ED): Performed by: INTERNAL MEDICINE

## 2024-08-11 RX ORDER — BUDESONIDE 0.5 MG/2ML
0.5 INHALANT ORAL
Status: DISCONTINUED | OUTPATIENT
Start: 2024-08-12 | End: 2024-08-12

## 2024-08-11 RX ORDER — IPRATROPIUM BROMIDE AND ALBUTEROL SULFATE 2.5; .5 MG/3ML; MG/3ML
3 SOLUTION RESPIRATORY (INHALATION) ONCE
Status: COMPLETED | OUTPATIENT
Start: 2024-08-12 | End: 2024-08-11

## 2024-08-11 RX ORDER — MAGNESIUM SULFATE HEPTAHYDRATE 40 MG/ML
2 INJECTION, SOLUTION INTRAVENOUS ONCE
Status: COMPLETED | OUTPATIENT
Start: 2024-08-11 | End: 2024-08-12

## 2024-08-11 RX ADMIN — IPRATROPIUM BROMIDE AND ALBUTEROL SULFATE 3 ML: 2.5; .5 SOLUTION RESPIRATORY (INHALATION) at 19:59

## 2024-08-11 RX ADMIN — BUPROPION HYDROCHLORIDE 150 MG: 150 TABLET, FILM COATED, EXTENDED RELEASE ORAL at 08:29

## 2024-08-11 RX ADMIN — IPRATROPIUM BROMIDE AND ALBUTEROL SULFATE 3 ML: 2.5; .5 SOLUTION RESPIRATORY (INHALATION) at 16:09

## 2024-08-11 RX ADMIN — METHYLPREDNISOLONE SODIUM SUCCINATE 60 MG: 125 INJECTION, POWDER, FOR SOLUTION INTRAMUSCULAR; INTRAVENOUS at 23:12

## 2024-08-11 RX ADMIN — ACETAMINOPHEN 975 MG: 325 TABLET ORAL at 06:30

## 2024-08-11 RX ADMIN — BUDESONIDE 0.25 MG: 0.25 INHALANT RESPIRATORY (INHALATION) at 07:31

## 2024-08-11 RX ADMIN — BUPROPION HYDROCHLORIDE 150 MG: 150 TABLET, FILM COATED, EXTENDED RELEASE ORAL at 20:53

## 2024-08-11 RX ADMIN — IPRATROPIUM BROMIDE AND ALBUTEROL SULFATE 3 ML: 2.5; .5 SOLUTION RESPIRATORY (INHALATION) at 05:34

## 2024-08-11 RX ADMIN — Medication 3 L/MIN: at 01:33

## 2024-08-11 RX ADMIN — Medication 3 L/MIN: at 05:36

## 2024-08-11 RX ADMIN — IPRATROPIUM BROMIDE AND ALBUTEROL SULFATE 3 ML: 2.5; .5 SOLUTION RESPIRATORY (INHALATION) at 07:31

## 2024-08-11 RX ADMIN — IPRATROPIUM BROMIDE AND ALBUTEROL SULFATE 3 ML: 2.5; .5 SOLUTION RESPIRATORY (INHALATION) at 22:12

## 2024-08-11 RX ADMIN — IPRATROPIUM BROMIDE AND ALBUTEROL SULFATE 3 ML: 2.5; .5 SOLUTION RESPIRATORY (INHALATION) at 12:25

## 2024-08-11 RX ADMIN — METHYLPREDNISOLONE SODIUM SUCCINATE 40 MG: 40 INJECTION, POWDER, FOR SOLUTION INTRAMUSCULAR; INTRAVENOUS at 14:39

## 2024-08-11 RX ADMIN — METHYLPREDNISOLONE SODIUM SUCCINATE 40 MG: 40 INJECTION, POWDER, FOR SOLUTION INTRAMUSCULAR; INTRAVENOUS at 06:20

## 2024-08-11 RX ADMIN — IPRATROPIUM BROMIDE AND ALBUTEROL SULFATE 3 ML: .5; 3 SOLUTION RESPIRATORY (INHALATION) at 22:10

## 2024-08-11 RX ADMIN — IPRATROPIUM BROMIDE AND ALBUTEROL SULFATE 3 ML: 2.5; .5 SOLUTION RESPIRATORY (INHALATION) at 19:49

## 2024-08-11 RX ADMIN — ENOXAPARIN SODIUM 40 MG: 40 INJECTION SUBCUTANEOUS at 17:15

## 2024-08-11 RX ADMIN — AZITHROMYCIN DIHYDRATE 500 MG: 500 TABLET ORAL at 08:29

## 2024-08-11 RX ADMIN — FLUTICASONE PROPIONATE 1 SPRAY: 50 SPRAY, METERED NASAL at 08:29

## 2024-08-11 RX ADMIN — IPRATROPIUM BROMIDE AND ALBUTEROL SULFATE 3 ML: 2.5; .5 SOLUTION RESPIRATORY (INHALATION) at 22:22

## 2024-08-11 RX ADMIN — IPRATROPIUM BROMIDE AND ALBUTEROL SULFATE 3 ML: 2.5; .5 SOLUTION RESPIRATORY (INHALATION) at 01:24

## 2024-08-11 RX ADMIN — NICOTINE 1 PATCH: 21 PATCH, EXTENDED RELEASE TRANSDERMAL at 08:29

## 2024-08-11 ASSESSMENT — COGNITIVE AND FUNCTIONAL STATUS - GENERAL
MOBILITY SCORE: 24
DAILY ACTIVITIY SCORE: 24
DAILY ACTIVITIY SCORE: 24
MOBILITY SCORE: 24

## 2024-08-11 ASSESSMENT — PAIN SCALES - GENERAL
PAINLEVEL_OUTOF10: 0 - NO PAIN

## 2024-08-11 NOTE — PROGRESS NOTES
"Jono Lott is a 28 y.o. male on day 0 of admission presenting with Mild intermittent asthma with exacerbation ([facility]).    Subjective   Patient seen and examined, no acute events overnight.  Patient still has intermittent shortness of breath and is wheezing.  He is here for asthma exacerbation receiving IV steroids and DuoNebs.  He is on 2 L nasal cannula from 4 L.  Pulmonology is following.  He is feeling better today compared to yesterday.  He denies chest pain, abdominal pain, nausea, vomiting or diarrhea.       Objective     Physical Exam  Vitals reviewed.   Constitutional:       Appearance: Normal appearance.   HENT:      Head: Normocephalic and atraumatic.      Nose: Nose normal.   Cardiovascular:      Rate and Rhythm: Normal rate and regular rhythm.      Pulses: Normal pulses.      Heart sounds: Normal heart sounds.   Pulmonary:      Breath sounds: No rales.      Comments: 2 L nasal cannula, diffuse wheezing bilaterally  Abdominal:      General: Abdomen is flat. Bowel sounds are normal.      Palpations: Abdomen is soft.      Tenderness: There is no abdominal tenderness.   Skin:     General: Skin is warm and dry.   Neurological:      Mental Status: He is alert and oriented to person, place, and time. Mental status is at baseline.   Psychiatric:         Mood and Affect: Mood normal.         Last Recorded Vitals  Blood pressure 136/66, pulse 85, temperature 36 °C (96.8 °F), temperature source Temporal, resp. rate 14, height 1.778 m (5' 10\"), weight 102 kg (225 lb), SpO2 98%.  Intake/Output last 3 Shifts:  I/O last 3 completed shifts:  In: 2230 (21.9 mL/kg) [P.O.:1680; I.V.:50 (0.5 mL/kg); IV Piggyback:500]  Out: 500 (4.9 mL/kg) [Urine:500 (0.1 mL/kg/hr)]  Weight: 102.1 kg     Relevant Results  Scheduled medications  azithromycin, 500 mg, oral, Daily  budesonide, 0.25 mg, nebulization, Daily  buPROPion XL, 150 mg, oral, BID  enoxaparin, 40 mg, subcutaneous, q24h  fluticasone, 1 spray, Each Nostril, " Daily  ipratropium-albuteroL, 3 mL, nebulization, q4h  methylPREDNISolone sodium succinate (PF), 40 mg, intravenous, q8h  nicotine, 1 patch, transdermal, Daily  polyethylene glycol, 17 g, oral, Daily      Continuous medications     PRN medications  PRN medications: acetaminophen, ipratropium-albuteroL, melatonin, ondansetron **OR** ondansetron, oxygen  Results from last 7 days   Lab Units 08/11/24  0555 08/10/24  0659 08/09/24  1039   WBC AUTO x10*3/uL 17.1* 14.1* 15.8*   RBC AUTO x10*6/uL 4.99 5.00 5.09   HEMOGLOBIN g/dL 15.0 14.9 15.3     Results from last 7 days   Lab Units 08/11/24  0555 08/10/24  0659 08/09/24  1039   SODIUM mmol/L 137 136 137   POTASSIUM mmol/L 4.2 4.2 3.9   CHLORIDE mmol/L 102 100 102   CO2 mmol/L 24 24 24   BUN mg/dL 18 21 14   CREATININE mg/dL 0.89 1.02 0.93   CALCIUM mg/dL 9.3 9.2 10.6*   MAGNESIUM mg/dL  --   --  1.82   BILIRUBIN TOTAL mg/dL  --   --  0.3   ALT U/L  --   --  33   AST U/L  --   --  17       Assessment/Plan   Principal Problem:    Mild intermittent asthma with exacerbation ([facility])  Asthma exacerbation  Acute hypoxic respiratory failure secondary to #1- on 2 L NC from 4L NC  Leukocytosis suspect reactive secondary to steroids  Tobacco abuse     Plan:  Wean O2 as tolerated- on 2 LNC from 4LNC   Full code  Vitals per protocol  Continue duonebs every 4 hours  Solumed 40 IVP TID  Covid/flu negative  Consult pulm Dr Stanton  Home meds ordered  Incentive spirometry  AM labs  Supportive care  Will follow patient closely            I spent 35 minutes in the professional and overall care of this patient.      Kamilah Moses DO

## 2024-08-11 NOTE — CARE PLAN
The patient's goals for the shift include      The clinical goals for the shift include pt will be free of respiratory distress this shift      Problem: Cardiovascular - Adult  Goal: Maintains optimal cardiac output and hemodynamic stability  Outcome: Progressing  Goal: Absence of cardiac dysrhythmias or at baseline  Outcome: Progressing     Problem: Respiratory - Adult  Goal: Achieves optimal ventilation and oxygenation  Outcome: Progressing     Problem: Hematologic - Adult  Goal: Maintains hematologic stability  Outcome: Progressing     Problem: Pain - Adult  Goal: Verbalizes/displays adequate comfort level or baseline comfort level  Reactivated     Problem: Safety - Adult  Goal: Free from fall injury  Reactivated     Problem: Discharge Planning  Goal: Discharge to home or other facility with appropriate resources  Reactivated     Problem: Chronic Conditions and Co-morbidities  Goal: Patient's chronic conditions and co-morbidity symptoms are monitored and maintained or improved  Reactivated     Problem: Respiratory  Goal: Clear secretions with interventions this shift  Reactivated  Goal: Minimize anxiety/maximize coping throughout shift  Reactivated  Goal: Minimal/no exertional discomfort or dyspnea this shift  Reactivated  Goal: No signs of respiratory distress (eg. Use of accessory muscles. Peds grunting)  Reactivated  Goal: Patent airway maintained this shift  Reactivated  Goal: Verbalize decreased shortness of breath this shift  Reactivated  Goal: Wean oxygen to maintain O2 saturation per order/standard this shift  Reactivated  Goal: Increase self care and/or family involvement in next 24 hours  Reactivated

## 2024-08-11 NOTE — CARE PLAN
Problem: Cardiovascular - Adult  Goal: Maintains optimal cardiac output and hemodynamic stability  Outcome: Progressing  Goal: Absence of cardiac dysrhythmias or at baseline  Outcome: Progressing     Problem: Respiratory - Adult  Goal: Achieves optimal ventilation and oxygenation  Outcome: Progressing     Problem: Hematologic - Adult  Goal: Maintains hematologic stability  Outcome: Progressing     Problem: Pain - Adult  Goal: Verbalizes/displays adequate comfort level or baseline comfort level  Outcome: Progressing     Problem: Safety - Adult  Goal: Free from fall injury  Outcome: Progressing     Problem: Discharge Planning  Goal: Discharge to home or other facility with appropriate resources  Outcome: Progressing     Problem: Chronic Conditions and Co-morbidities  Goal: Patient's chronic conditions and co-morbidity symptoms are monitored and maintained or improved  Outcome: Progressing     Problem: Respiratory  Goal: Clear secretions with interventions this shift  Outcome: Progressing  Goal: Minimize anxiety/maximize coping throughout shift  Outcome: Progressing  Goal: Minimal/no exertional discomfort or dyspnea this shift  Outcome: Progressing  Goal: No signs of respiratory distress (eg. Use of accessory muscles. Peds grunting)  Outcome: Progressing  Goal: Patent airway maintained this shift  Outcome: Progressing  Goal: Verbalize decreased shortness of breath this shift  Outcome: Progressing  Goal: Wean oxygen to maintain O2 saturation per order/standard this shift  Outcome: Progressing  Goal: Increase self care and/or family involvement in next 24 hours  Outcome: Progressing   The patient's goals for the shift include      The clinical goals for the shift include pt will be free from worsening sob t/o the shift

## 2024-08-11 NOTE — PROGRESS NOTES
"Jono Lott is a 28 y.o. male on day 0 of admission presenting with Mild intermittent asthma with exacerbation ([facility]).    Subjective   Mr. Lott felt it was easier to take deep breaths today. He feels that his treatments almost last long enough but still needs them closely scheduled.   He got lightheaded getting up to the bathroom.     Objective     Physical Exam  Vitals reviewed.   Constitutional:       Appearance: He is obese.   HENT:      Head: Normocephalic.      Nose: Nose normal.      Mouth/Throat:      Mouth: Mucous membranes are moist.   Eyes:      Extraocular Movements: Extraocular movements intact.   Cardiovascular:      Rate and Rhythm: Normal rate and regular rhythm.      Heart sounds: No murmur heard.  Pulmonary:      Effort: Pulmonary effort is normal.      Breath sounds: No wheezing or rhonchi.   Musculoskeletal:      Right lower leg: No edema.      Left lower leg: No edema.   Neurological:      General: No focal deficit present.      Mental Status: He is alert and oriented to person, place, and time.         Last Recorded Vitals  Blood pressure 145/69, pulse 96, temperature 36 °C (96.8 °F), temperature source Temporal, resp. rate 16, height 1.778 m (5' 10\"), weight 102 kg (225 lb), SpO2 98%.  Intake/Output last 3 Shifts:  I/O last 3 completed shifts:  In: 2230 (21.9 mL/kg) [P.O.:1680; I.V.:50 (0.5 mL/kg); IV Piggyback:500]  Out: 500 (4.9 mL/kg) [Urine:500 (0.1 mL/kg/hr)]  Weight: 102.1 kg     Relevant Results  Scheduled medications  azithromycin, 500 mg, oral, Daily  budesonide, 0.25 mg, nebulization, Daily  buPROPion XL, 150 mg, oral, BID  enoxaparin, 40 mg, subcutaneous, q24h  fluticasone, 1 spray, Each Nostril, Daily  ipratropium-albuteroL, 3 mL, nebulization, q4h  methylPREDNISolone sodium succinate (PF), 40 mg, intravenous, q8h  nicotine, 1 patch, transdermal, Daily  polyethylene glycol, 17 g, oral, Daily      Continuous medications     PRN medications  PRN medications: acetaminophen, " ipratropium-albuteroL, melatonin, ondansetron **OR** ondansetron, oxygen  Results for orders placed or performed during the hospital encounter of 08/09/24 (from the past 24 hour(s))   CBC   Result Value Ref Range    WBC 17.1 (H) 4.4 - 11.3 x10*3/uL    nRBC 0.0 0.0 - 0.0 /100 WBCs    RBC 4.99 4.50 - 5.90 x10*6/uL    Hemoglobin 15.0 13.5 - 17.5 g/dL    Hematocrit 44.4 41.0 - 52.0 %    MCV 89 80 - 100 fL    MCH 30.1 26.0 - 34.0 pg    MCHC 33.8 32.0 - 36.0 g/dL    RDW 12.3 11.5 - 14.5 %    Platelets 207 150 - 450 x10*3/uL   Basic Metabolic Panel   Result Value Ref Range    Glucose 215 (H) 74 - 99 mg/dL    Sodium 137 136 - 145 mmol/L    Potassium 4.2 3.5 - 5.3 mmol/L    Chloride 102 98 - 107 mmol/L    Bicarbonate 24 21 - 32 mmol/L    Anion Gap 15 10 - 20 mmol/L    Urea Nitrogen 18 6 - 23 mg/dL    Creatinine 0.89 0.50 - 1.30 mg/dL    eGFR >90 >60 mL/min/1.73m*2    Calcium 9.3 8.6 - 10.3 mg/dL     No results found.      Assessment/Plan   Principal Problem:    Mild intermittent asthma with exacerbation ([facility])    Mr. Lott is a 28M PMH asthma, tobacco/THC use admitted for asthma exacerbation  Consult for asthma     #Asthma exacerbation  2/2 recent pharyngitis and exertion with high temperatures  Patient should continue steroids and nebulizers as prescribed by primary team  Agree with q4 duonebs. Encouraged patient to use q2PRN albuterol for showers and movements in the room  Continue steroids, taper to come once patient shows improvement. Continue IV today and might be able to switch to PO tomorrow  Negative infectious and viral studies so far  Agree with continuing azithromycin  Can add bronchitis coverage if symptoms persist    I spent 40 minutes in the professional and overall care of this patient.      Kayce Stanton MD

## 2024-08-12 LAB
ANION GAP SERPL CALC-SCNC: 14 MMOL/L (ref 10–20)
ATRIAL RATE: 77 BPM
ATRIAL RATE: 93 BPM
BUN SERPL-MCNC: 23 MG/DL (ref 6–23)
CALCIUM SERPL-MCNC: 9 MG/DL (ref 8.6–10.3)
CHLORIDE SERPL-SCNC: 101 MMOL/L (ref 98–107)
CO2 SERPL-SCNC: 24 MMOL/L (ref 21–32)
CREAT SERPL-MCNC: 0.95 MG/DL (ref 0.5–1.3)
EGFRCR SERPLBLD CKD-EPI 2021: >90 ML/MIN/1.73M*2
ERYTHROCYTE [DISTWIDTH] IN BLOOD BY AUTOMATED COUNT: 12.3 % (ref 11.5–14.5)
GLUCOSE SERPL-MCNC: 211 MG/DL (ref 74–99)
HCT VFR BLD AUTO: 43.1 % (ref 41–52)
HGB BLD-MCNC: 14.4 G/DL (ref 13.5–17.5)
MCH RBC QN AUTO: 29.4 PG (ref 26–34)
MCHC RBC AUTO-ENTMCNC: 33.4 G/DL (ref 32–36)
MCV RBC AUTO: 88 FL (ref 80–100)
NRBC BLD-RTO: 0 /100 WBCS (ref 0–0)
P AXIS: 52 DEGREES
P AXIS: 56 DEGREES
P OFFSET: 180 MS
P OFFSET: 181 MS
P ONSET: 134 MS
P ONSET: 134 MS
PLATELET # BLD AUTO: 202 X10*3/UL (ref 150–450)
POTASSIUM SERPL-SCNC: 4.2 MMOL/L (ref 3.5–5.3)
PR INTERVAL: 160 MS
PR INTERVAL: 162 MS
PROCALCITONIN SERPL-MCNC: 0.02 NG/ML
Q ONSET: 214 MS
Q ONSET: 215 MS
QRS COUNT: 13 BEATS
QRS COUNT: 15 BEATS
QRS DURATION: 102 MS
QRS DURATION: 98 MS
QT INTERVAL: 368 MS
QT INTERVAL: 374 MS
QTC CALCULATION(BAZETT): 423 MS
QTC CALCULATION(BAZETT): 457 MS
QTC FREDERICIA: 406 MS
QTC FREDERICIA: 426 MS
R AXIS: 57 DEGREES
R AXIS: 58 DEGREES
RBC # BLD AUTO: 4.89 X10*6/UL (ref 4.5–5.9)
SODIUM SERPL-SCNC: 135 MMOL/L (ref 136–145)
T AXIS: 49 DEGREES
T AXIS: 53 DEGREES
T OFFSET: 398 MS
T OFFSET: 402 MS
VENTRICULAR RATE: 77 BPM
VENTRICULAR RATE: 93 BPM
WBC # BLD AUTO: 15.3 X10*3/UL (ref 4.4–11.3)

## 2024-08-12 PROCEDURE — 2500000004 HC RX 250 GENERAL PHARMACY W/ HCPCS (ALT 636 FOR OP/ED): Performed by: INTERNAL MEDICINE

## 2024-08-12 PROCEDURE — 2500000002 HC RX 250 W HCPCS SELF ADMINISTERED DRUGS (ALT 637 FOR MEDICARE OP, ALT 636 FOR OP/ED)

## 2024-08-12 PROCEDURE — 87205 SMEAR GRAM STAIN: CPT | Mod: STJLAB | Performed by: INTERNAL MEDICINE

## 2024-08-12 PROCEDURE — 94640 AIRWAY INHALATION TREATMENT: CPT

## 2024-08-12 PROCEDURE — 1200000002 HC GENERAL ROOM WITH TELEMETRY DAILY

## 2024-08-12 PROCEDURE — 99233 SBSQ HOSP IP/OBS HIGH 50: CPT | Performed by: INTERNAL MEDICINE

## 2024-08-12 PROCEDURE — 2500000002 HC RX 250 W HCPCS SELF ADMINISTERED DRUGS (ALT 637 FOR MEDICARE OP, ALT 636 FOR OP/ED): Performed by: INTERNAL MEDICINE

## 2024-08-12 PROCEDURE — 36415 COLL VENOUS BLD VENIPUNCTURE: CPT | Performed by: INTERNAL MEDICINE

## 2024-08-12 PROCEDURE — 80048 BASIC METABOLIC PNL TOTAL CA: CPT | Performed by: INTERNAL MEDICINE

## 2024-08-12 PROCEDURE — 2500000004 HC RX 250 GENERAL PHARMACY W/ HCPCS (ALT 636 FOR OP/ED)

## 2024-08-12 PROCEDURE — 2500000001 HC RX 250 WO HCPCS SELF ADMINISTERED DRUGS (ALT 637 FOR MEDICARE OP): Performed by: INTERNAL MEDICINE

## 2024-08-12 PROCEDURE — 84145 PROCALCITONIN (PCT): CPT | Mod: STJLAB | Performed by: INTERNAL MEDICINE

## 2024-08-12 PROCEDURE — S4991 NICOTINE PATCH NONLEGEND: HCPCS

## 2024-08-12 PROCEDURE — 85027 COMPLETE CBC AUTOMATED: CPT | Performed by: INTERNAL MEDICINE

## 2024-08-12 RX ADMIN — FLUTICASONE PROPIONATE 1 SPRAY: 50 SPRAY, METERED NASAL at 08:18

## 2024-08-12 RX ADMIN — IPRATROPIUM BROMIDE AND ALBUTEROL SULFATE 3 ML: 2.5; .5 SOLUTION RESPIRATORY (INHALATION) at 00:40

## 2024-08-12 RX ADMIN — NICOTINE 1 PATCH: 21 PATCH, EXTENDED RELEASE TRANSDERMAL at 08:19

## 2024-08-12 RX ADMIN — METHYLPREDNISOLONE SODIUM SUCCINATE 40 MG: 40 INJECTION, POWDER, FOR SOLUTION INTRAMUSCULAR; INTRAVENOUS at 14:37

## 2024-08-12 RX ADMIN — AZITHROMYCIN DIHYDRATE 500 MG: 500 TABLET ORAL at 08:19

## 2024-08-12 RX ADMIN — MAGNESIUM SULFATE HEPTAHYDRATE 2 G: 40 INJECTION, SOLUTION INTRAVENOUS at 00:44

## 2024-08-12 RX ADMIN — IPRATROPIUM BROMIDE AND ALBUTEROL SULFATE 3 ML: 2.5; .5 SOLUTION RESPIRATORY (INHALATION) at 05:20

## 2024-08-12 RX ADMIN — IPRATROPIUM BROMIDE AND ALBUTEROL SULFATE 3 ML: 2.5; .5 SOLUTION RESPIRATORY (INHALATION) at 12:08

## 2024-08-12 RX ADMIN — BUPROPION HYDROCHLORIDE 150 MG: 150 TABLET, FILM COATED, EXTENDED RELEASE ORAL at 21:18

## 2024-08-12 RX ADMIN — BUPROPION HYDROCHLORIDE 150 MG: 150 TABLET, FILM COATED, EXTENDED RELEASE ORAL at 08:19

## 2024-08-12 RX ADMIN — METHYLPREDNISOLONE SODIUM SUCCINATE 40 MG: 40 INJECTION, POWDER, FOR SOLUTION INTRAMUSCULAR; INTRAVENOUS at 06:27

## 2024-08-12 RX ADMIN — IPRATROPIUM BROMIDE AND ALBUTEROL SULFATE 3 ML: 2.5; .5 SOLUTION RESPIRATORY (INHALATION) at 21:48

## 2024-08-12 RX ADMIN — METHYLPREDNISOLONE SODIUM SUCCINATE 40 MG: 40 INJECTION, POWDER, FOR SOLUTION INTRAMUSCULAR; INTRAVENOUS at 22:56

## 2024-08-12 RX ADMIN — ENOXAPARIN SODIUM 40 MG: 40 INJECTION SUBCUTANEOUS at 16:31

## 2024-08-12 RX ADMIN — IPRATROPIUM BROMIDE AND ALBUTEROL SULFATE 3 ML: 2.5; .5 SOLUTION RESPIRATORY (INHALATION) at 16:34

## 2024-08-12 RX ADMIN — IPRATROPIUM BROMIDE AND ALBUTEROL SULFATE 3 ML: 2.5; .5 SOLUTION RESPIRATORY (INHALATION) at 08:40

## 2024-08-12 RX ADMIN — BUDESONIDE 0.5 MG: 0.5 INHALANT RESPIRATORY (INHALATION) at 08:40

## 2024-08-12 RX ADMIN — IPRATROPIUM BROMIDE AND ALBUTEROL SULFATE 3 ML: 2.5; .5 SOLUTION RESPIRATORY (INHALATION) at 02:48

## 2024-08-12 ASSESSMENT — COGNITIVE AND FUNCTIONAL STATUS - GENERAL
DAILY ACTIVITIY SCORE: 24
MOBILITY SCORE: 24
DAILY ACTIVITIY SCORE: 24
MOBILITY SCORE: 24

## 2024-08-12 ASSESSMENT — PAIN SCALES - GENERAL
PAINLEVEL_OUTOF10: 0 - NO PAIN

## 2024-08-12 ASSESSMENT — PAIN SCALES - WONG BAKER
WONGBAKER_NUMERICALRESPONSE: NO HURT
WONGBAKER_NUMERICALRESPONSE: NO HURT

## 2024-08-12 ASSESSMENT — PAIN - FUNCTIONAL ASSESSMENT
PAIN_FUNCTIONAL_ASSESSMENT: 0-10
PAIN_FUNCTIONAL_ASSESSMENT: 0-10

## 2024-08-12 NOTE — CARE PLAN
The patient's goals for the shift include      The clinical goals for the shift include MINTAIN 02 SATGREATERTHAN 92% ON 02 2 L/NC    Problem: Respiratory  Goal: Wean oxygen to maintain O2 saturation per order/standard this shift  Outcome: Not Progressing

## 2024-08-12 NOTE — PROGRESS NOTES
"Jono Lott is a 28 y.o. male on day 1 of admission presenting with Mild intermittent asthma with exacerbation ([facility]    Subjective   Patient seen in bed. He has been weaned off oxygen.   Reports that his chest feels tight and hurts to take a deep breath.  Very short of breath on exertion. Reports that is unable to walk to the bathroom without assistance and was only able to walk about half a hallway last night with nurse before needing to rest.   His only home medication is albuterol which he was using about 20 times daily prior to admission.   He continue to have cough which is now productive of a brown sputum.  Reports difficultly sleeping while on steroids. He is not interested in any medication for sleep.         Objective     Physical Exam  Constitutional:       Appearance: He is obese.   Cardiovascular:      Rate and Rhythm: Normal rate and regular rhythm.   Pulmonary:      Effort: Pulmonary effort is normal.      Breath sounds: Wheezing (mild wheezing bilaterally) present.      Comments: Diminished lung sounds   Abdominal:      General: Abdomen is flat.      Palpations: Abdomen is soft.   Skin:     General: Skin is warm and dry.   Neurological:      General: No focal deficit present.      Mental Status: He is alert.           Last Recorded Vitals  Blood pressure 143/61, pulse 84, temperature 36.5 °C (97.7 °F), temperature source Temporal, resp. rate 18, height 1.778 m (5' 10\"), weight 102 kg (225 lb), SpO2 99%.  Intake/Output last 3 Shifts:  I/O last 3 completed shifts:  In: 440 (4.3 mL/kg) [P.O.:390; I.V.:50 (0.5 mL/kg)]  Out: 2200 (21.6 mL/kg) [Urine:2200 (0.6 mL/kg/hr)]  Weight: 102.1 kg     Relevant Results  Scheduled medications  azithromycin, 500 mg, oral, Daily  budesonide, 0.5 mg, nebulization, BID  buPROPion XL, 150 mg, oral, BID  enoxaparin, 40 mg, subcutaneous, q24h  fluticasone, 1 spray, Each Nostril, Daily  ipratropium-albuteroL, 3 mL, nebulization, q4h  methylPREDNISolone sodium succinate " (PF), 40 mg, intravenous, q8h  nicotine, 1 patch, transdermal, Daily  polyethylene glycol, 17 g, oral, Daily      Continuous medications     PRN medications  PRN medications: acetaminophen, ipratropium-albuteroL, melatonin, ondansetron **OR** ondansetron, oxygen    Results for orders placed or performed during the hospital encounter of 08/09/24 (from the past 24 hour(s))   CBC   Result Value Ref Range    WBC 15.3 (H) 4.4 - 11.3 x10*3/uL    nRBC 0.0 0.0 - 0.0 /100 WBCs    RBC 4.89 4.50 - 5.90 x10*6/uL    Hemoglobin 14.4 13.5 - 17.5 g/dL    Hematocrit 43.1 41.0 - 52.0 %    MCV 88 80 - 100 fL    MCH 29.4 26.0 - 34.0 pg    MCHC 33.4 32.0 - 36.0 g/dL    RDW 12.3 11.5 - 14.5 %    Platelets 202 150 - 450 x10*3/uL   Basic Metabolic Panel   Result Value Ref Range    Glucose 211 (H) 74 - 99 mg/dL    Sodium 135 (L) 136 - 145 mmol/L    Potassium 4.2 3.5 - 5.3 mmol/L    Chloride 101 98 - 107 mmol/L    Bicarbonate 24 21 - 32 mmol/L    Anion Gap 14 10 - 20 mmol/L    Urea Nitrogen 23 6 - 23 mg/dL    Creatinine 0.95 0.50 - 1.30 mg/dL    eGFR >90 >60 mL/min/1.73m*2    Calcium 9.0 8.6 - 10.3 mg/dL     CXR 8/11/24  IMPRESSION:  1.  Bibasilar airspace opacities, may be secondary to pneumonia or  atelectasis.      Assessment/Plan   Principal Problem:    Mild intermittent asthma with exacerbation ([facility])    Asthma Exacerbation  -Suspect 2/2 recent pharyngitis and exertion with high temperatures   - Continue scheduled IV steroids and nebulizers with albuterol prn q2    - Negative infectious studies so far. Sputum culture collected today for testing  - Procal 0.02  - If patient not improving over the next 24 hours would advise CT chest  - Will need controller inhaler on discharge     Anna Torres, DO

## 2024-08-12 NOTE — NURSING NOTE
Pts. Tele monitor NSR - ST specially on  exertion and after                     respiratory treatment.

## 2024-08-12 NOTE — NURSING NOTE
Pt.  Claims slept 1 hour  only.continuous pulse ox maintained. Gets sob   On exertion. Had on and off productive cough to tan sputum.02 maintained at 2 liters NC. Lungs sounds diminished. Ambulated in the kwong way w/ O2at 2 liters tolerated w/ few stops. INCENTIVE SPIROMETRY DID UP TO 1000 ML VOLUME.

## 2024-08-12 NOTE — DOCUMENTATION CLARIFICATION NOTE
"    PATIENT:               ODETTE BLACK  ACCT #:                  [id number]  MRN:                       [id number]  :                       1995  ADMIT DATE:       2024 10:17 AM  DISCH DATE:  RESPONDING PROVIDER #:        [id number]          PROVIDER RESPONSE TEXT:    Elevated lactate on VBG/ABG clinically insignificant    CDI QUERY TEXT:    Clarification    Instruction:    Based on your assessment of the patient and the clinical information, please provide the requested documentation by clicking on the appropriate radio button and enter any additional information if prompted.    Question: Is there a diagnosis indicative of the lab values and clinical findings    When answering this query, please exercise your independent professional judgment. The fact that a question is being asked, does not imply that any particular answer is desired or expected.    The patient's clinical indicators include:  Clinical Information:  28 yr old male presenting for SOB, started on antibiotic in outpatient setting w/history of Asthma.  Admitted for Mild Intermittent Asthma Exacerbation w/Acute Hypoxic Respiratory Failure.    Clinical Indicators:  Initial VS on admission : T 36.0, HR 98, RR 22 RA w/pox 98, /91.    On 8/10: HR 97, RR 24 w/pox 97, then dropping to 91 w/placement of supplemental O2 2L, then increased to 4L via NC w/expiratory wheezing.    VBG : Lactate 3.8, 2.2.    ABG 8/10: Lactate 3.7.    IM note  0125 PM states \"Mild intermittent asthma with exacerbation\" and \"Acute hypoxic respiratory failure\".    Treatment: VBG/ABG w/lactate.  Supplemental oxygen 2-4L via NC.  Pox.  LR IVF bolus 500 mL x1 on 8/10.  Pulmonology consult.    Risk Factors: Hx of Asthma w/Streptococcal Pharyngitis, Obesity, Nicotine Dependence.  Options provided:  -- Lactic Acidosis present and required treatment/monitoring  -- Elevated lactate on VBG/ABG clinically insignificant  -- Other - I will add my own diagnosis  -- Refer to " Clinical Documentation Reviewer    Query created by: Stephanie Walden on 8/12/2024 3:37 PM      Electronically signed by:  FARHAN MELENDEZ DO 8/12/2024 3:52 PM

## 2024-08-12 NOTE — CARE PLAN
The patient's goals for the shift include      The clinical goals for the shift include MINTAIN 02 SATGREATERTHAN 92% ON 02 2 L/NC    Over the shift, the patient did not make progress toward the following goals. Barriers to progression include . Recommendations to address these barriers include .    Problem: Cardiovascular - Adult  Goal: Maintains optimal cardiac output and hemodynamic stability  Outcome: Progressing  Goal: Absence of cardiac dysrhythmias or at baseline  Outcome: Progressing     Problem: Respiratory - Adult  Goal: Achieves optimal ventilation and oxygenation  Outcome: Progressing     Problem: Hematologic - Adult  Goal: Maintains hematologic stability  Outcome: Progressing     Problem: Pain - Adult  Goal: Verbalizes/displays adequate comfort level or baseline comfort level  Outcome: Progressing     Problem: Discharge Planning  Goal: Discharge to home or other facility with appropriate resources  Outcome: Progressing     Problem: Safety - Adult  Goal: Free from fall injury  Outcome: Progressing     Problem: Respiratory  Goal: Clear secretions with interventions this shift  Outcome: Progressing

## 2024-08-12 NOTE — NURSING NOTE
Pt was given two Duonebs at 1949 c/o sob and feeling he could not breathe. Pt stated relief with back to back tx. Pt called again at  2210 for sob and very anxious at this time. Given two Duonebs again with pt stating some relief. Spoke with Dr Arcos concerning pt's breathing status additional orders were placed.

## 2024-08-12 NOTE — PROGRESS NOTES
"Jono Lott is a 28 y.o. male on day 1 of admission presenting with Mild intermittent asthma with exacerbation ([facility]).    Subjective   Patient seen and examined, overnight he felt short of breath.  He is still on 2 L nasal cannula.  He has diffuse wheezing bilaterally.  He states that he showered yesterday and felt significantly short of breath afterwards and immediately needed a breathing treatment.   Even with minimal ambulation patient feels short of breath. He is nervous going home to care for his 3 year old with limited exercise tolerance at this time.     He denies chest pain, abdominal pain, nausea, vomiting or diarrhea.  He does state that he is coughing up some brown sputum, will try to send it for culture.       Objective     Physical Exam  Vitals reviewed.   Constitutional:       Appearance: Normal appearance.   HENT:      Head: Normocephalic and atraumatic.      Nose: Nose normal.   Cardiovascular:      Rate and Rhythm: Normal rate and regular rhythm.      Pulses: Normal pulses.      Heart sounds: Normal heart sounds.   Pulmonary:      Breath sounds: No rales.      Comments: On 2 L nasal cannula, wheezing bilaterally  Abdominal:      General: Abdomen is flat. Bowel sounds are normal.      Palpations: Abdomen is soft.      Tenderness: There is no abdominal tenderness.   Skin:     General: Skin is warm and dry.   Neurological:      Mental Status: He is alert and oriented to person, place, and time. Mental status is at baseline.   Psychiatric:         Mood and Affect: Mood normal.         Last Recorded Vitals  Blood pressure 162/78, pulse 100, temperature 36.6 °C (97.9 °F), temperature source Temporal, resp. rate 20, height 1.778 m (5' 10\"), weight 102 kg (225 lb), SpO2 98%.  Intake/Output last 3 Shifts:  I/O last 3 completed shifts:  In: 440 (4.3 mL/kg) [P.O.:390; I.V.:50 (0.5 mL/kg)]  Out: 2200 (21.6 mL/kg) [Urine:2200 (0.6 mL/kg/hr)]  Weight: 102.1 kg     Relevant Results  Scheduled " medications  azithromycin, 500 mg, oral, Daily  budesonide, 0.5 mg, nebulization, BID  buPROPion XL, 150 mg, oral, BID  enoxaparin, 40 mg, subcutaneous, q24h  fluticasone, 1 spray, Each Nostril, Daily  ipratropium-albuteroL, 3 mL, nebulization, q4h  methylPREDNISolone sodium succinate (PF), 40 mg, intravenous, q8h  nicotine, 1 patch, transdermal, Daily  polyethylene glycol, 17 g, oral, Daily      Continuous medications     PRN medications  PRN medications: acetaminophen, ipratropium-albuteroL, melatonin, ondansetron **OR** ondansetron, oxygen  Results from last 7 days   Lab Units 08/12/24  0519 08/11/24  0555 08/10/24  0659   WBC AUTO x10*3/uL 15.3* 17.1* 14.1*   RBC AUTO x10*6/uL 4.89 4.99 5.00   HEMOGLOBIN g/dL 14.4 15.0 14.9     Results from last 7 days   Lab Units 08/12/24 0519 08/11/24  0555 08/10/24  0659 08/09/24  1039   SODIUM mmol/L 135* 137 136 137   POTASSIUM mmol/L 4.2 4.2 4.2 3.9   CHLORIDE mmol/L 101 102 100 102   CO2 mmol/L 24 24 24 24   BUN mg/dL 23 18 21 14   CREATININE mg/dL 0.95 0.89 1.02 0.93   CALCIUM mg/dL 9.0 9.3 9.2 10.6*   MAGNESIUM mg/dL  --   --   --  1.82   BILIRUBIN TOTAL mg/dL  --   --   --  0.3   ALT U/L  --   --   --  33   AST U/L  --   --   --  17       Assessment/Plan   Principal Problem:    Mild intermittent asthma with exacerbation ([facility])  Asthma exacerbation  Acute hypoxic respiratory failure secondary to #1- on 2 L NC from 4L NC  Leukocytosis suspect reactive secondary to steroids  Tobacco abuse     Plan:  Wean O2 as tolerated, plan to wean to room air today as tolerated  Repeat cxray over night  Encourage incentive spirometry  Full code  Vitals per protocol  Continue duonebs every 4 hours  Solumed 40 IVP TID  Covid/flu negative  Consult pulm Dr Stanton  [facility] ordered  Incentive spirometry  AM labs stable no need for repeat  Supportive care  Will follow patient closely            I spent 35 minutes in the professional and overall care of this patient.      Kamilah ORPER  Josué, DO

## 2024-08-13 ENCOUNTER — APPOINTMENT (OUTPATIENT)
Dept: RADIOLOGY | Facility: HOSPITAL | Age: 29
End: 2024-08-13

## 2024-08-13 LAB
ANION GAP SERPL CALC-SCNC: 16 MMOL/L (ref 10–20)
BUN SERPL-MCNC: 26 MG/DL (ref 6–23)
CALCIUM SERPL-MCNC: 9.4 MG/DL (ref 8.6–10.3)
CHLORIDE SERPL-SCNC: 99 MMOL/L (ref 98–107)
CO2 SERPL-SCNC: 24 MMOL/L (ref 21–32)
CREAT SERPL-MCNC: 0.97 MG/DL (ref 0.5–1.3)
EGFRCR SERPLBLD CKD-EPI 2021: >90 ML/MIN/1.73M*2
ERYTHROCYTE [DISTWIDTH] IN BLOOD BY AUTOMATED COUNT: 12.2 % (ref 11.5–14.5)
GLUCOSE SERPL-MCNC: 159 MG/DL (ref 74–99)
HCT VFR BLD AUTO: 46.2 % (ref 41–52)
HGB BLD-MCNC: 15.5 G/DL (ref 13.5–17.5)
MCH RBC QN AUTO: 29.4 PG (ref 26–34)
MCHC RBC AUTO-ENTMCNC: 33.5 G/DL (ref 32–36)
MCV RBC AUTO: 88 FL (ref 80–100)
NRBC BLD-RTO: 0 /100 WBCS (ref 0–0)
PLATELET # BLD AUTO: 239 X10*3/UL (ref 150–450)
POTASSIUM SERPL-SCNC: 4.1 MMOL/L (ref 3.5–5.3)
RBC # BLD AUTO: 5.28 X10*6/UL (ref 4.5–5.9)
SODIUM SERPL-SCNC: 135 MMOL/L (ref 136–145)
WBC # BLD AUTO: 15.9 X10*3/UL (ref 4.4–11.3)

## 2024-08-13 PROCEDURE — 99233 SBSQ HOSP IP/OBS HIGH 50: CPT | Performed by: INTERNAL MEDICINE

## 2024-08-13 PROCEDURE — 85027 COMPLETE CBC AUTOMATED: CPT | Performed by: INTERNAL MEDICINE

## 2024-08-13 PROCEDURE — 71250 CT THORAX DX C-: CPT

## 2024-08-13 PROCEDURE — 80048 BASIC METABOLIC PNL TOTAL CA: CPT | Performed by: INTERNAL MEDICINE

## 2024-08-13 PROCEDURE — 36415 COLL VENOUS BLD VENIPUNCTURE: CPT | Performed by: INTERNAL MEDICINE

## 2024-08-13 PROCEDURE — S4991 NICOTINE PATCH NONLEGEND: HCPCS

## 2024-08-13 PROCEDURE — 71250 CT THORAX DX C-: CPT | Performed by: RADIOLOGY

## 2024-08-13 PROCEDURE — 2500000001 HC RX 250 WO HCPCS SELF ADMINISTERED DRUGS (ALT 637 FOR MEDICARE OP): Performed by: INTERNAL MEDICINE

## 2024-08-13 PROCEDURE — 2500000002 HC RX 250 W HCPCS SELF ADMINISTERED DRUGS (ALT 637 FOR MEDICARE OP, ALT 636 FOR OP/ED)

## 2024-08-13 PROCEDURE — 1200000002 HC GENERAL ROOM WITH TELEMETRY DAILY

## 2024-08-13 PROCEDURE — 2500000004 HC RX 250 GENERAL PHARMACY W/ HCPCS (ALT 636 FOR OP/ED): Performed by: INTERNAL MEDICINE

## 2024-08-13 PROCEDURE — 94640 AIRWAY INHALATION TREATMENT: CPT

## 2024-08-13 RX ORDER — ZOLPIDEM TARTRATE 5 MG/1
2.5 TABLET ORAL NIGHTLY PRN
Status: DISCONTINUED | OUTPATIENT
Start: 2024-08-13 | End: 2024-08-17 | Stop reason: HOSPADM

## 2024-08-13 RX ADMIN — BUPROPION HYDROCHLORIDE 150 MG: 150 TABLET, FILM COATED, EXTENDED RELEASE ORAL at 20:38

## 2024-08-13 RX ADMIN — NICOTINE 1 PATCH: 21 PATCH, EXTENDED RELEASE TRANSDERMAL at 08:31

## 2024-08-13 RX ADMIN — IPRATROPIUM BROMIDE AND ALBUTEROL SULFATE 3 ML: 2.5; .5 SOLUTION RESPIRATORY (INHALATION) at 04:14

## 2024-08-13 RX ADMIN — IPRATROPIUM BROMIDE AND ALBUTEROL SULFATE 3 ML: 2.5; .5 SOLUTION RESPIRATORY (INHALATION) at 11:48

## 2024-08-13 RX ADMIN — IPRATROPIUM BROMIDE AND ALBUTEROL SULFATE 3 ML: 2.5; .5 SOLUTION RESPIRATORY (INHALATION) at 00:13

## 2024-08-13 RX ADMIN — IPRATROPIUM BROMIDE AND ALBUTEROL SULFATE 3 ML: 2.5; .5 SOLUTION RESPIRATORY (INHALATION) at 16:49

## 2024-08-13 RX ADMIN — BUPROPION HYDROCHLORIDE 150 MG: 150 TABLET, FILM COATED, EXTENDED RELEASE ORAL at 08:31

## 2024-08-13 RX ADMIN — IPRATROPIUM BROMIDE AND ALBUTEROL SULFATE 3 ML: 2.5; .5 SOLUTION RESPIRATORY (INHALATION) at 19:52

## 2024-08-13 RX ADMIN — ENOXAPARIN SODIUM 40 MG: 40 INJECTION SUBCUTANEOUS at 17:45

## 2024-08-13 RX ADMIN — IPRATROPIUM BROMIDE AND ALBUTEROL SULFATE 3 ML: 2.5; .5 SOLUTION RESPIRATORY (INHALATION) at 08:39

## 2024-08-13 RX ADMIN — METHYLPREDNISOLONE SODIUM SUCCINATE 40 MG: 40 INJECTION, POWDER, FOR SOLUTION INTRAMUSCULAR; INTRAVENOUS at 06:08

## 2024-08-13 RX ADMIN — POLYETHYLENE GLYCOL 3350 17 G: 17 POWDER, FOR SOLUTION ORAL at 08:31

## 2024-08-13 RX ADMIN — AZITHROMYCIN DIHYDRATE 500 MG: 500 TABLET ORAL at 08:31

## 2024-08-13 RX ADMIN — METHYLPREDNISOLONE SODIUM SUCCINATE 40 MG: 40 INJECTION, POWDER, FOR SOLUTION INTRAMUSCULAR; INTRAVENOUS at 15:34

## 2024-08-13 RX ADMIN — METHYLPREDNISOLONE SODIUM SUCCINATE 40 MG: 40 INJECTION, POWDER, FOR SOLUTION INTRAMUSCULAR; INTRAVENOUS at 22:55

## 2024-08-13 ASSESSMENT — COGNITIVE AND FUNCTIONAL STATUS - GENERAL
MOBILITY SCORE: 24
DAILY ACTIVITIY SCORE: 24

## 2024-08-13 ASSESSMENT — PAIN - FUNCTIONAL ASSESSMENT: PAIN_FUNCTIONAL_ASSESSMENT: 0-10

## 2024-08-13 ASSESSMENT — PAIN SCALES - GENERAL
PAINLEVEL_OUTOF10: 0 - NO PAIN
PAINLEVEL_OUTOF10: 0 - NO PAIN

## 2024-08-13 NOTE — CARE PLAN
The patient's goals for the shift include      The clinical goals for the shift include pt will have not further worsening of sob this shift      Problem: Respiratory  Goal: Clear secretions with interventions this shift  Flowsheets (Taken 8/13/2024 1025)  Clear secretions with interventions this shift:   Encourage/provide pulmonary hygiene/secretion clearance   Incentive spirometry   Med administration/monitoring of effect

## 2024-08-13 NOTE — PROGRESS NOTES
08/13/24 0912   Discharge Planning   Living Arrangements Spouse/significant other   Support Systems Spouse/significant other   Type of Residence Private residence   Home or Post Acute Services None   Expected Discharge Disposition Home   Does the patient need discharge transport arranged? No     Met with patient at bedside. Admitted for Exac asthma. Pt lives with spouse and child and was independent PTA with no HHC or DME. PCP is Henry Styles. Pt feels he is able to manage his health and understands his medications. Currently on room air. Pt plans to return home with no new discharge needs. Family will provide transport.

## 2024-08-13 NOTE — CARE PLAN
The patient's goals for the shift include      The clinical goals for the shift include pt will have not further worsening of sob this shift    Over the shift, the patient did some  make progress toward the following goals. Pt has applied O2 on for comfort at night, denied increase sob however stated he felt O2 helped, pt is receiving breathing treatment [id number] and has not requested any additional doses , pt has been able to sleep in long intervals, call for assistance using ncl and remained safe this shift     [employer]

## 2024-08-13 NOTE — PROGRESS NOTES
"Jono Lott is a 28 y.o. male on day 2 of admission presenting with Mild intermittent asthma with exacerbation ([facility]).    Subjective   Patient seen and examined, no acute events overnight.  He did state that he was unable to sleep, will order a dose of Ambien for tonight.  Pulmonology is following, he still has decreased breath sounds throughout.  He was weaned off oxygen but still feels short of breath with any exertion. He gets extremely SOB with a shower or slight exertion around the unit. Pulmonology recommends CT of the chest without contrast which is ordered at this time.  His appetite is good.  He denies any further complaints.  Pro-Garrett was negative.       Objective     Physical Exam  Vitals reviewed.   Constitutional:       Appearance: Normal appearance.   HENT:      Head: Normocephalic and atraumatic.      Nose: Nose normal.   Cardiovascular:      Rate and Rhythm: Normal rate and regular rhythm.      Pulses: Normal pulses.      Heart sounds: Normal heart sounds.   Pulmonary:      Breath sounds: No rales.      Comments: Diffuse wheezing bilaterally, decreased breath sounds throughout  Abdominal:      General: Abdomen is flat. Bowel sounds are normal.      Palpations: Abdomen is soft.      Tenderness: There is no abdominal tenderness.   Skin:     General: Skin is warm and dry.   Neurological:      Mental Status: He is alert and oriented to person, place, and time. Mental status is at baseline.   Psychiatric:         Mood and Affect: Mood normal.         Last Recorded Vitals  Blood pressure (!) 157/99, pulse 88, temperature 36.5 °C (97.7 °F), temperature source Temporal, resp. rate 19, height 1.778 m (5' 10\"), weight 102 kg (225 lb), SpO2 97%.  Intake/Output last 3 Shifts:  I/O last 3 completed shifts:  In: 920 (9 mL/kg) [P.O.:870; I.V.:50 (0.5 mL/kg)]  Out: 2100 (20.6 mL/kg) [Urine:2100 (0.6 mL/kg/hr)]  Weight: 102.1 kg     Relevant Results  Scheduled medications  azithromycin, 500 mg, oral, " Daily  buPROPion XL, 150 mg, oral, BID  enoxaparin, 40 mg, subcutaneous, q24h  fluticasone, 1 spray, Each Nostril, Daily  ipratropium-albuteroL, 3 mL, nebulization, q4h  methylPREDNISolone sodium succinate (PF), 40 mg, intravenous, q8h  nicotine, 1 patch, transdermal, Daily  polyethylene glycol, 17 g, oral, Daily      Continuous medications     PRN medications  PRN medications: acetaminophen, ipratropium-albuteroL, melatonin, ondansetron **OR** ondansetron, oxygen, zolpidem  Results from last 7 days   Lab Units 08/13/24  0536 08/12/24  0519 08/11/24  0555   WBC AUTO x10*3/uL 15.9* 15.3* 17.1*   RBC AUTO x10*6/uL 5.28 4.89 4.99   HEMOGLOBIN g/dL 15.5 14.4 15.0     Results from last 7 days   Lab Units 08/13/24  0535 08/12/24  0519 08/11/24  0555 08/10/24  0659 08/09/24  1039   SODIUM mmol/L 135* 135* 137   < > 137   POTASSIUM mmol/L 4.1 4.2 4.2   < > 3.9   CHLORIDE mmol/L 99 101 102   < > 102   CO2 mmol/L 24 24 24   < > 24   BUN mg/dL 26* 23 18   < > 14   CREATININE mg/dL 0.97 0.95 0.89   < > 0.93   CALCIUM mg/dL 9.4 9.0 9.3   < > 10.6*   MAGNESIUM mg/dL  --   --   --   --  1.82   BILIRUBIN TOTAL mg/dL  --   --   --   --  0.3   ALT U/L  --   --   --   --  33   AST U/L  --   --   --   --  17    < > = values in this interval not displayed.       Assessment/Plan   Principal Problem:    Mild intermittent asthma with exacerbation ([facility])  Asthma exacerbation  Acute hypoxic respiratory failure secondary to #1- improving, on 2L NC intermittently today  Leukocytosis suspect reactive secondary to steroids  Tobacco abuse     Plan:  CT chest ordered today by pulm  Dose ambien ordered PRN insomnia since patient is not sleeping well with the steroids  Encourage incentive spirometry  Full code  Vitals per protocol  Continue duonebs every 4 hours  Solumed 40 IVP TID  Covid/flu negative  Consult pulm Dr Stanton  [place] meds ordered  Incentive spirometry  AM labs stable no need for repeat  Supportive care  Will follow patient closely,  will await CT chest results            I spent 35 minutes in the professional and overall care of this patient.      Kamilah Moses, DO

## 2024-08-13 NOTE — PROGRESS NOTES
Department of Medicine  Division of Pulmonary, Critical Care, and Sleep Medicine    Jono Lott is a 28 y.o. male on day 2 of admission presenting with Mild intermittent asthma with exacerbation ([facility]).    Subjective   Patient was seen and examined at bedside this morning. He reports feeling about the same. He is currently on 2L NC because he got a little short of breath with straining during a bowel movement earlier. He was able to walk to the bathroom on RA without difficulties and assistance. He continues to experience chest tightness with ambulation on RA. He needed a breathing treatment overnight. He denies any fever, chills, or changes in coughs.     Objective     Vital Signs      8/12/2024     8:00 AM 8/12/2024    12:00 PM 8/12/2024     4:00 PM 8/12/2024     8:00 PM 8/13/2024    12:00 AM 8/13/2024     4:00 AM 8/13/2024     8:00 AM   Vitals   Systolic 143 162 139 166 171 158 154   Diastolic 61 78 67 76 95 87 96   Heart Rate 84 100   80 85 71   Temp 36.5 °C (97.7 °F) 36.6 °C (97.9 °F) 37 °C (98.6 °F) 36.7 °C (98.1 °F) 36.3 °C (97.3 °F) 36.1 °C (97 °F) 36.1 °C (97 °F)   Resp 18 20 18 18 16 18 16        Physical Exam  Vitals and nursing note reviewed.   Constitutional:       General: No acute distress.     Appearance: Normal appearance.   HENT:      Head: Normocephalic and atraumatic.      Mouth/Throat:      Mouth: Mucous membranes are moist.   Eyes:      Conjunctiva/sclera: Conjunctivae normal.   Cardiovascular:      Rate and Rhythm: Normal rate and regular rhythm.   Pulmonary:      Effort: Pulmonary effort is normal. No respiratory distress.      Breath sounds: Diminished breath sounds. No stridor. No wheezing or rhonchi.  Musculoskeletal:         General: Normal range of motion.      Cervical back: Normal range of motion and neck supple.   Skin:     General: Skin is warm and dry.      Coloration: Skin is not jaundiced.   Neurological:      General: No focal deficit present.      Mental Status: Alert and  oriented to person, place, and time. Mental status is at baseline.   Psychiatric:         Mood and Affect: Mood normal.         Behavior: Behavior normal.         Judgment: Judgment normal.     Labs:  Lab Results   Component Value Date    WBC 15.9 (H) 08/13/2024    HGB 15.5 08/13/2024    HCT 46.2 08/13/2024    MCV 88 08/13/2024     08/13/2024      Lab Results   Component Value Date    GLUCOSE 159 (H) 08/13/2024    CALCIUM 9.4 08/13/2024     (L) 08/13/2024    K 4.1 08/13/2024    CO2 24 08/13/2024    CL 99 08/13/2024    BUN 26 (H) 08/13/2024    CREATININE 0.97 08/13/2024      Lab Results   Component Value Date    ALT 33 08/09/2024    AST 17 08/09/2024    ALKPHOS 55 08/09/2024    BILITOT 0.3 08/09/2024        Oxygen Therapy  SpO2: 95 %  Medical Gas Therapy: None (Room air)  O2 Delivery Method: Nasal cannula       Intake/Output last 3 Shifts:  I/O last 3 completed shifts:  In: 920 (9 mL/kg) [P.O.:870; I.V.:50 (0.5 mL/kg)]  Out: 2100 (20.6 mL/kg) [Urine:2100 (0.6 mL/kg/hr)]  Weight: 102.1 kg       Medications   Scheduled medications  azithromycin, 500 mg, oral, Daily  buPROPion XL, 150 mg, oral, BID  enoxaparin, 40 mg, subcutaneous, q24h  fluticasone, 1 spray, Each Nostril, Daily  ipratropium-albuteroL, 3 mL, nebulization, q4h  methylPREDNISolone sodium succinate (PF), 40 mg, intravenous, q8h  nicotine, 1 patch, transdermal, Daily  polyethylene glycol, 17 g, oral, Daily      Continuous medications     PRN medications  PRN medications: acetaminophen, ipratropium-albuteroL, melatonin, ondansetron **OR** ondansetron, oxygen     Allergies  Amoxicillin, Aspirin, Nsaids (non-steroidal anti-inflammatory drug), and Penicillins      Chest Radiograph   No results found for this or any previous visit from the past 10 days.       XR chest 1 view 08/11/2024    Narrative  Interpreted By:  Delaney Ling,  STUDY:  XR CHEST 1 VIEW;  8/11/2024 11:16 pm    INDICATION:  Signs/Symptoms: Asthma with new productive  cough    COMPARISON:  Chest x-ray 08/09/2024    ACCESSION NUMBER(S):  [id number]    ORDERING CLINICIAN:  SARKIS BARRERA    TECHNIQUE:  Portable upright frontal view of the chest was obtained .    FINDINGS:  Monitoring wires are overlying the patient.    The cardiomediastinal silhouette is within normal limits.    There are bibasilar airspace opacities. No pleural effusion or  pneumothorax.    Impression  1.  Bibasilar airspace opacities, may be secondary to pneumonia or  atelectasis.        MACRO:  None.    Signed by: Delaney Ling 8/12/2024 1:19 AM  Dictation workstation:   [id number]      XR chest 1 view 08/09/2024    Narrative  Interpreted By:  Buddy Manning,  STUDY:  XR CHEST 1 VIEW; 8/9/2024 11:00 am    INDICATION:  Signs/Symptoms:sob    COMPARISON:  Radiographs 08/08/2024    ACCESSION NUMBER(S):  [id number]    ORDERING CLINICIAN:  PREETI SOLIS    TECHNIQUE:  Single frontal view of the chest performed.    FINDINGS:  LINES AND DEVICES:  None.    LUNGS:  No focal consolidation, pulmonary edema, pleural effusion or  pneumothorax.    CARDIOMEDIASTINAL SILHOUETTE:  The cardiomediastinal silhouette is within normal limits.    Impression  No acute pulmonary process.    MACRO  None    Signed by: Buddy Manning 8/9/2024 11:16 AM  Dictation workstation:   [id number]      XR chest 2 views 08/08/2024    Narrative  STUDY:  Chest Radiographs;  8/8/2024 12:16 PM.  INDICATION:  Shortness of breath.  COMPARISON:  Chest radiographs 8/6/2024.  ACCESSION NUMBER(S):  [id number]  ORDERING CLINICIAN:  JOHNNY BURROUGHS  TECHNIQUE:  Frontal and lateral chest.  FINDINGS:  CARDIOMEDIASTINAL SILHOUETTE:  Cardiomediastinal silhouette is normal in size and configuration.    LUNGS:  Lungs are clear.    ABDOMEN:  No remarkable upper abdominal findings.    BONES:  No acute osseous changes.    Impression  No acute pulmonary pathology.  Signed by Collin Best MD         Assessment and Plan / Recommendations   Assessment/Plan      Asthma Exacerbation  - Suspect 2/2 recent pharyngitis and exertion with high temperatures   - Procal 0.02  - Sputum culture 8/12: no predominant organism, lower respiratory tract secretions  - Negative infectious studies so far  Plan:  - Continue scheduled IV steroids and nebulizers with albuterol prn q2   - Will need maintenance inhaler on discharge in addition to rescue inhaler  - Will obtain CT chest without contract to rule out other underlying etiologies     Rosalba Márquez, DO  Internal Medicine PGY-1 Resident     Date: 08/13/24 Time: 10:45 AM  Please excuse any misspellings or unintended errors related to the Dragon speech recognition software used to dictate this note.

## 2024-08-14 PROCEDURE — 94668 MNPJ CHEST WALL SBSQ: CPT

## 2024-08-14 PROCEDURE — 94667 MNPJ CHEST WALL 1ST: CPT

## 2024-08-14 PROCEDURE — 2500000002 HC RX 250 W HCPCS SELF ADMINISTERED DRUGS (ALT 637 FOR MEDICARE OP, ALT 636 FOR OP/ED)

## 2024-08-14 PROCEDURE — 99233 SBSQ HOSP IP/OBS HIGH 50: CPT | Performed by: INTERNAL MEDICINE

## 2024-08-14 PROCEDURE — 2500000001 HC RX 250 WO HCPCS SELF ADMINISTERED DRUGS (ALT 637 FOR MEDICARE OP): Performed by: INTERNAL MEDICINE

## 2024-08-14 PROCEDURE — 2500000004 HC RX 250 GENERAL PHARMACY W/ HCPCS (ALT 636 FOR OP/ED): Performed by: INTERNAL MEDICINE

## 2024-08-14 PROCEDURE — 94640 AIRWAY INHALATION TREATMENT: CPT

## 2024-08-14 PROCEDURE — S4991 NICOTINE PATCH NONLEGEND: HCPCS

## 2024-08-14 PROCEDURE — 1200000002 HC GENERAL ROOM WITH TELEMETRY DAILY

## 2024-08-14 RX ORDER — AMLODIPINE BESYLATE 5 MG/1
5 TABLET ORAL DAILY
Status: DISCONTINUED | OUTPATIENT
Start: 2024-08-14 | End: 2024-08-17 | Stop reason: HOSPADM

## 2024-08-14 RX ORDER — CALCIUM CARBONATE 200(500)MG
500 TABLET,CHEWABLE ORAL 4 TIMES DAILY PRN
Status: DISCONTINUED | OUTPATIENT
Start: 2024-08-14 | End: 2024-08-17 | Stop reason: HOSPADM

## 2024-08-14 RX ORDER — MICONAZOLE NITRATE 2 %
2 CREAM (GRAM) TOPICAL
Status: DISCONTINUED | OUTPATIENT
Start: 2024-08-14 | End: 2024-08-17 | Stop reason: HOSPADM

## 2024-08-14 RX ADMIN — POLYETHYLENE GLYCOL 3350 17 G: 17 POWDER, FOR SOLUTION ORAL at 09:29

## 2024-08-14 RX ADMIN — AMLODIPINE BESYLATE 5 MG: 5 TABLET ORAL at 20:27

## 2024-08-14 RX ADMIN — IPRATROPIUM BROMIDE AND ALBUTEROL SULFATE 3 ML: 2.5; .5 SOLUTION RESPIRATORY (INHALATION) at 00:06

## 2024-08-14 RX ADMIN — IPRATROPIUM BROMIDE AND ALBUTEROL SULFATE 3 ML: 2.5; .5 SOLUTION RESPIRATORY (INHALATION) at 08:11

## 2024-08-14 RX ADMIN — NICOTINE 1 PATCH: 21 PATCH, EXTENDED RELEASE TRANSDERMAL at 09:29

## 2024-08-14 RX ADMIN — METHYLPREDNISOLONE SODIUM SUCCINATE 40 MG: 40 INJECTION, POWDER, FOR SOLUTION INTRAMUSCULAR; INTRAVENOUS at 06:41

## 2024-08-14 RX ADMIN — BUPROPION HYDROCHLORIDE 150 MG: 150 TABLET, FILM COATED, EXTENDED RELEASE ORAL at 20:28

## 2024-08-14 RX ADMIN — CALCIUM CARBONATE (ANTACID) CHEW TAB 500 MG 500 MG: 500 CHEW TAB at 15:02

## 2024-08-14 RX ADMIN — AZITHROMYCIN DIHYDRATE 500 MG: 500 TABLET ORAL at 09:30

## 2024-08-14 RX ADMIN — METHYLPREDNISOLONE SODIUM SUCCINATE 40 MG: 40 INJECTION, POWDER, FOR SOLUTION INTRAMUSCULAR; INTRAVENOUS at 20:27

## 2024-08-14 RX ADMIN — BUPROPION HYDROCHLORIDE 150 MG: 150 TABLET, FILM COATED, EXTENDED RELEASE ORAL at 09:30

## 2024-08-14 RX ADMIN — IPRATROPIUM BROMIDE AND ALBUTEROL SULFATE 3 ML: 2.5; .5 SOLUTION RESPIRATORY (INHALATION) at 23:31

## 2024-08-14 RX ADMIN — IPRATROPIUM BROMIDE AND ALBUTEROL SULFATE 3 ML: 2.5; .5 SOLUTION RESPIRATORY (INHALATION) at 03:59

## 2024-08-14 RX ADMIN — IPRATROPIUM BROMIDE AND ALBUTEROL SULFATE 3 ML: 2.5; .5 SOLUTION RESPIRATORY (INHALATION) at 19:50

## 2024-08-14 RX ADMIN — IPRATROPIUM BROMIDE AND ALBUTEROL SULFATE 3 ML: 2.5; .5 SOLUTION RESPIRATORY (INHALATION) at 16:12

## 2024-08-14 RX ADMIN — IPRATROPIUM BROMIDE AND ALBUTEROL SULFATE 3 ML: 2.5; .5 SOLUTION RESPIRATORY (INHALATION) at 12:00

## 2024-08-14 ASSESSMENT — COGNITIVE AND FUNCTIONAL STATUS - GENERAL
DAILY ACTIVITIY SCORE: 24
MOBILITY SCORE: 24

## 2024-08-14 ASSESSMENT — PAIN - FUNCTIONAL ASSESSMENT: PAIN_FUNCTIONAL_ASSESSMENT: 0-10

## 2024-08-14 ASSESSMENT — PAIN SCALES - GENERAL
PAINLEVEL_OUTOF10: 0 - NO PAIN
PAINLEVEL_OUTOF10: 0 - NO PAIN

## 2024-08-14 NOTE — PROGRESS NOTES
Department of Medicine  Division of Pulmonary, Critical Care, and Sleep Medicine    Jono Lott is a 28 y.o. male on day 3 of admission presenting with Mild intermittent asthma with exacerbation ([facility]).    Subjective   Patient was seen and examined at bedside this morning. He reports he has been ambulating in his room on room air more without difficulties. However, he continues to get short of breath and needs to wear oxygen for 5 to 10 minutes to recover. He has been using EzPap with his breathing treatment. He did not require a breathing treatment overnight but didn't sleep well. He denies any fever, chills, or changes in coughs.     Objective     Vital Signs      8/13/2024     4:00 AM 8/13/2024     8:00 AM 8/13/2024    12:00 PM 8/13/2024     4:00 PM 8/13/2024     8:00 PM 8/14/2024    12:00 AM 8/14/2024     3:59 AM   Vitals   Systolic 158 154 151 157 167 159 157   Diastolic 87 96 92 99 96 99 100   Heart Rate 85 71 77 88 81 84 72   Temp 36.1 °C (97 °F) 36.1 °C (97 °F) 36.5 °C (97.7 °F) 36.5 °C (97.7 °F) 36.7 °C (98.1 °F) 36.7 °C (98.1 °F) 36.1 °C (97 °F)   Resp 18 16 16 19 18 18 18        Physical Exam  Vitals and nursing note reviewed.   Constitutional:       General: No acute distress.     Appearance: Normal appearance.   HENT:      Head: Normocephalic and atraumatic.      Mouth/Throat:      Mouth: Mucous membranes are moist.   Eyes:      Conjunctiva/sclera: Conjunctivae normal.   Cardiovascular:      Rate and Rhythm: Normal rate and regular rhythm.   Pulmonary:      Effort: Pulmonary effort is normal. No respiratory distress.      Breath sounds: Diminished breath sounds. No stridor, wheezing or rhonchi.  Musculoskeletal:         General: Normal range of motion.      Cervical back: Normal range of motion and neck supple.   Skin:     General: Skin is warm and dry.      Coloration: Skin is not jaundiced.   Neurological:      General: No focal deficit present.      Mental Status: Alert and oriented to person,  place, and time. Mental status is at baseline.   Psychiatric:         Mood and Affect: Mood normal.         Behavior: Behavior normal.         Judgment: Judgment normal.     Labs:  Lab Results   Component Value Date    WBC 15.9 (H) 08/13/2024    HGB 15.5 08/13/2024    HCT 46.2 08/13/2024    MCV 88 08/13/2024     08/13/2024      Lab Results   Component Value Date    GLUCOSE 159 (H) 08/13/2024    CALCIUM 9.4 08/13/2024     (L) 08/13/2024    K 4.1 08/13/2024    CO2 24 08/13/2024    CL 99 08/13/2024    BUN 26 (H) 08/13/2024    CREATININE 0.97 08/13/2024      Lab Results   Component Value Date    ALT 33 08/09/2024    AST 17 08/09/2024    ALKPHOS 55 08/09/2024    BILITOT 0.3 08/09/2024        Oxygen Therapy  SpO2: 95 %  Medical Gas Therapy: None (Room air)  O2 Delivery Method: Nasal cannula       Intake/Output last 3 Shifts:  I/O last 3 completed shifts:  In: 1080 (10.6 mL/kg) [P.O.:1080]  Out: 1000 (9.8 mL/kg) [Urine:1000 (0.3 mL/kg/hr)]  Weight: 102.1 kg       Medications   Scheduled medications  azithromycin, 500 mg, oral, Daily  buPROPion XL, 150 mg, oral, BID  enoxaparin, 40 mg, subcutaneous, q24h  fluticasone, 1 spray, Each Nostril, Daily  ipratropium-albuteroL, 3 mL, nebulization, q4h  methylPREDNISolone sodium succinate (PF), 40 mg, intravenous, q8h  nicotine, 1 patch, transdermal, Daily  polyethylene glycol, 17 g, oral, Daily      Continuous medications     PRN medications  PRN medications: acetaminophen, ipratropium-albuteroL, melatonin, ondansetron **OR** ondansetron, oxygen, zolpidem     Allergies  Amoxicillin, Aspirin, Nsaids (non-steroidal anti-inflammatory drug), and Penicillins      Chest Radiograph   No results found for this or any previous visit from the past 10 days.       XR chest 1 view 08/11/2024    Narrative  Interpreted By:  Delaney Ling,  STUDY:  XR CHEST 1 VIEW;  8/11/2024 11:16 pm    INDICATION:  Signs/Symptoms: Asthma with new productive cough    COMPARISON:  Chest x-ray  08/09/2024    ACCESSION NUMBER(S):  [id number]    ORDERING CLINICIAN:  SARKIS BARRERA    TECHNIQUE:  Portable upright frontal view of the chest was obtained .    FINDINGS:  Monitoring wires are overlying the patient.    The cardiomediastinal silhouette is within normal limits.    There are bibasilar airspace opacities. No pleural effusion or  pneumothorax.    Impression  1.  Bibasilar airspace opacities, may be secondary to pneumonia or  atelectasis.        MACRO:  None.    Signed by: Delaney Ling 8/12/2024 1:19 AM  Dictation workstation:   [id number]      XR chest 1 view 08/09/2024    Narrative  Interpreted By:  Buddy Manning,  STUDY:  XR CHEST 1 VIEW; 8/9/2024 11:00 am    INDICATION:  Signs/Symptoms:sob    COMPARISON:  Radiographs 08/08/2024    ACCESSION NUMBER(S):  [id number]    ORDERING CLINICIAN:  PREETI SOLIS    TECHNIQUE:  Single frontal view of the chest performed.    FINDINGS:  LINES AND DEVICES:  None.    LUNGS:  No focal consolidation, pulmonary edema, pleural effusion or  pneumothorax.    CARDIOMEDIASTINAL SILHOUETTE:  The cardiomediastinal silhouette is within normal limits.    Impression  No acute pulmonary process.    MACRO  None    Signed by: Buddy Manning 8/9/2024 11:16 AM  Dictation workstation:   [id number]      XR chest 2 views 08/08/2024    Narrative  STUDY:  Chest Radiographs;  8/8/2024 12:16 PM.  INDICATION:  Shortness of breath.  COMPARISON:  Chest radiographs 8/6/2024.  ACCESSION NUMBER(S):  [id number]  ORDERING CLINICIAN:  JOHNNY BURROUGHS  TECHNIQUE:  Frontal and lateral chest.  FINDINGS:  CARDIOMEDIASTINAL SILHOUETTE:  Cardiomediastinal silhouette is normal in size and configuration.    LUNGS:  Lungs are clear.    ABDOMEN:  No remarkable upper abdominal findings.    BONES:  No acute osseous changes.    Impression  No acute pulmonary pathology.  Signed by Collin Best MD         Assessment and Plan / Recommendations   Assessment/Plan     #Asthma Exacerbation  -  Suspect 2/2 recent pharyngitis and exertion with high temperatures   - Procal 0.02  - Sputum culture 8/12: no predominant organism, lower respiratory tract secretions  - Negative infectious studies so far  - CT chest 8/14 shows no active disease in the chest, mild bibasilar atelectasis, otherwise unremarkable appearance of the lungs.   Plan:  - Continue nebulizers with albuterol prn q2   - Continue EzPap q4h with RT  - Will need maintenance inhaler on discharge in addition to rescue inhaler  - Titrated Solumedrol to q12h, will continue to deescalate with eventual transition to oral steroids    Rosalba Márquez, DO  Internal Medicine PGY-1 Resident     Date: 08/14/24 Time: 8:17 AM  Please excuse any misspellings or unintended errors related to the Dragon speech recognition software used to dictate this note.

## 2024-08-14 NOTE — CARE PLAN
The patient's goals for the shift include      The clinical goals for the shift include remain HDS    Over the shift, the patient did some make progress toward the following goals. Pt continues to be sob, however has not required prn respiratory tx, this shift,, pt has been able to make needs known, using ncl and has remained safe this shift

## 2024-08-14 NOTE — PROGRESS NOTES
"Jono Lott is a 28 y.o. male on day 3 of admission presenting with Mild intermittent asthma with exacerbation ([facility]).    Subjective   Patient seen and examined, no acute events overnight.  Patient unfortunately did not sleep well, I did order Ambien however patient forgot to ask for it.  He intermittently still needs oxygen.  He states he is not feeling better.  We did do a CT of the chest which shows no acute process. Pulm is following, titrating his IV steroids from TID to BID. Overall his labs are stable.  He is intermittently hypertensive, will start norvasc. He also is craving nicotine so will order nicorette gum.       Objective     Vitals reviewed.   Constitutional:       Appearance: Normal appearance.   HENT:      Head: Normocephalic and atraumatic.      Nose: Nose normal.   Cardiovascular:      Rate and Rhythm: Normal rate and regular rhythm.      Pulses: Normal pulses.      Heart sounds: Normal heart sounds.   Pulmonary:      Breath sounds: No rales.      Comments: Diffuse wheezing bilaterally, decreased breath sounds throughout  Abdominal:      General: Abdomen is flat. Bowel sounds are normal.      Palpations: Abdomen is soft.      Tenderness: There is no abdominal tenderness.   Skin:     General: Skin is warm and dry.   Neurological:      Mental Status: He is alert and oriented to person, place, and time. Mental status is at baseline.   Psychiatric:         Mood and Affect: Mood normal.     Last Recorded Vitals  Blood pressure (!) 158/101, pulse 92, temperature 36.4 °C (97.5 °F), temperature source Temporal, resp. rate 16, height 1.778 m (5' 10\"), weight 102 kg (225 lb), SpO2 96%.  Intake/Output last 3 Shifts:  I/O last 3 completed shifts:  In: 1080 (10.6 mL/kg) [P.O.:1080]  Out: 1000 (9.8 mL/kg) [Urine:1000 (0.3 mL/kg/hr)]  Weight: 102.1 kg     Relevant Results  Scheduled medications  buPROPion XL, 150 mg, oral, BID  enoxaparin, 40 mg, subcutaneous, q24h  fluticasone, 1 spray, Each Nostril, " Daily  ipratropium-albuteroL, 3 mL, nebulization, q4h  methylPREDNISolone sodium succinate (PF), 40 mg, intravenous, q12h  nicotine, 1 patch, transdermal, Daily  polyethylene glycol, 17 g, oral, Daily      Continuous medications     PRN medications  PRN medications: acetaminophen, ipratropium-albuteroL, melatonin, ondansetron **OR** ondansetron, oxygen, zolpidem  Results from last 7 days   Lab Units 08/13/24  0536 08/12/24  0519 08/11/24  0555   WBC AUTO x10*3/uL 15.9* 15.3* 17.1*   RBC AUTO x10*6/uL 5.28 4.89 4.99   HEMOGLOBIN g/dL 15.5 14.4 15.0     Results from last 7 days   Lab Units 08/13/24  0535 08/12/24  0519 08/11/24  0555 08/10/24  0659 08/09/24  1039   SODIUM mmol/L 135* 135* 137   < > 137   POTASSIUM mmol/L 4.1 4.2 4.2   < > 3.9   CHLORIDE mmol/L 99 101 102   < > 102   CO2 mmol/L 24 24 24   < > 24   BUN mg/dL 26* 23 18   < > 14   CREATININE mg/dL 0.97 0.95 0.89   < > 0.93   CALCIUM mg/dL 9.4 9.0 9.3   < > 10.6*   MAGNESIUM mg/dL  --   --   --   --  1.82   BILIRUBIN TOTAL mg/dL  --   --   --   --  0.3   ALT U/L  --   --   --   --  33   AST U/L  --   --   --   --  17    < > = values in this interval not displayed.       Assessment/Plan   Principal Problem:    Mild intermittent asthma with exacerbation ([facility])  Asthma exacerbation  Acute hypoxic respiratory failure secondary to #1- improving, on 2L NC intermittently today  Leukocytosis suspect reactive secondary to steroids  Tobacco abuse   HTN    Plan:  Start norvasc 5mg daily for uncontrolled HTN  Nicorette gum ordered, patient experiencing nicotine cravings, patch is also on  CT chest ordered today by pulm- no active process  Dose ambien ordered PRN insomnia since patient is not sleeping well with the steroids  Encourage incentive spirometry  Full code  Vitals per protocol  Continue duonebs every 4 hours  Solumed 40 IVP TID  Covid/flu negative  Consult pulm- Dr Ptael following, decrease solumed from 40 TID to 40 BID  Home meds ordered  Incentive  spirometry  AM labs stable no need for repeat  Supportive care  Will follow patient closely,          I spent 35 minutes in the professional and overall care of this patient.      Kamilah Moses, DO

## 2024-08-14 NOTE — CARE PLAN
The patient's goals for the shift include      The clinical goals for the shift include remain HDS      Problem: Pain - Adult  Goal: Verbalizes/displays adequate comfort level or baseline comfort level  Flowsheets (Taken 8/14/2024 0083)  Verbalizes/displays adequate comfort level or baseline comfort level:   Encourage patient to monitor pain and request assistance   Assess pain using appropriate pain scale   Administer analgesics based on type and severity of pain and evaluate response   Implement non-pharmacological measures as appropriate and evaluate response

## 2024-08-15 LAB
BACTERIA SPEC RESP CULT: ABNORMAL
BACTERIA SPEC RESP CULT: ABNORMAL
GRAM STN SPEC: ABNORMAL
GRAM STN SPEC: ABNORMAL

## 2024-08-15 PROCEDURE — 2500000001 HC RX 250 WO HCPCS SELF ADMINISTERED DRUGS (ALT 637 FOR MEDICARE OP)

## 2024-08-15 PROCEDURE — 2500000002 HC RX 250 W HCPCS SELF ADMINISTERED DRUGS (ALT 637 FOR MEDICARE OP, ALT 636 FOR OP/ED)

## 2024-08-15 PROCEDURE — 94640 AIRWAY INHALATION TREATMENT: CPT

## 2024-08-15 PROCEDURE — 2500000001 HC RX 250 WO HCPCS SELF ADMINISTERED DRUGS (ALT 637 FOR MEDICARE OP): Performed by: INTERNAL MEDICINE

## 2024-08-15 PROCEDURE — 99233 SBSQ HOSP IP/OBS HIGH 50: CPT | Performed by: INTERNAL MEDICINE

## 2024-08-15 PROCEDURE — S4991 NICOTINE PATCH NONLEGEND: HCPCS

## 2024-08-15 PROCEDURE — 2500000004 HC RX 250 GENERAL PHARMACY W/ HCPCS (ALT 636 FOR OP/ED): Performed by: INTERNAL MEDICINE

## 2024-08-15 PROCEDURE — 94668 MNPJ CHEST WALL SBSQ: CPT

## 2024-08-15 PROCEDURE — 1200000002 HC GENERAL ROOM WITH TELEMETRY DAILY

## 2024-08-15 RX ORDER — NYSTATIN 100000 [USP'U]/ML
5 SUSPENSION ORAL 4 TIMES DAILY
Status: DISCONTINUED | OUTPATIENT
Start: 2024-08-15 | End: 2024-08-15

## 2024-08-15 RX ORDER — PANTOPRAZOLE SODIUM 40 MG/1
40 TABLET, DELAYED RELEASE ORAL
Status: DISCONTINUED | OUTPATIENT
Start: 2024-08-15 | End: 2024-08-17 | Stop reason: HOSPADM

## 2024-08-15 RX ORDER — CLOTRIMAZOLE 10 MG/1
10 LOZENGE ORAL
Status: DISCONTINUED | OUTPATIENT
Start: 2024-08-15 | End: 2024-08-17 | Stop reason: HOSPADM

## 2024-08-15 RX ADMIN — IPRATROPIUM BROMIDE AND ALBUTEROL SULFATE 3 ML: 2.5; .5 SOLUTION RESPIRATORY (INHALATION) at 08:06

## 2024-08-15 RX ADMIN — PANTOPRAZOLE SODIUM 40 MG: 40 TABLET, DELAYED RELEASE ORAL at 17:01

## 2024-08-15 RX ADMIN — NICOTINE POLACRILEX 2 MG: 2 GUM, CHEWING BUCCAL at 21:53

## 2024-08-15 RX ADMIN — BUPROPION HYDROCHLORIDE 150 MG: 150 TABLET, FILM COATED, EXTENDED RELEASE ORAL at 08:06

## 2024-08-15 RX ADMIN — BUPROPION HYDROCHLORIDE 150 MG: 150 TABLET, FILM COATED, EXTENDED RELEASE ORAL at 21:43

## 2024-08-15 RX ADMIN — AMLODIPINE BESYLATE 5 MG: 5 TABLET ORAL at 08:06

## 2024-08-15 RX ADMIN — CLOTRIMAZOLE 10 MG: 10 LOZENGE ORAL; TOPICAL at 17:01

## 2024-08-15 RX ADMIN — IPRATROPIUM BROMIDE AND ALBUTEROL SULFATE 3 ML: 2.5; .5 SOLUTION RESPIRATORY (INHALATION) at 04:16

## 2024-08-15 RX ADMIN — IPRATROPIUM BROMIDE AND ALBUTEROL SULFATE 3 ML: 2.5; .5 SOLUTION RESPIRATORY (INHALATION) at 19:52

## 2024-08-15 RX ADMIN — CLOTRIMAZOLE 10 MG: 10 LOZENGE ORAL; TOPICAL at 21:43

## 2024-08-15 RX ADMIN — IPRATROPIUM BROMIDE AND ALBUTEROL SULFATE 3 ML: 2.5; .5 SOLUTION RESPIRATORY (INHALATION) at 16:04

## 2024-08-15 RX ADMIN — METHYLPREDNISOLONE SODIUM SUCCINATE 40 MG: 40 INJECTION, POWDER, FOR SOLUTION INTRAMUSCULAR; INTRAVENOUS at 08:06

## 2024-08-15 RX ADMIN — IPRATROPIUM BROMIDE AND ALBUTEROL SULFATE 3 ML: 2.5; .5 SOLUTION RESPIRATORY (INHALATION) at 11:49

## 2024-08-15 RX ADMIN — NICOTINE 1 PATCH: 21 PATCH, EXTENDED RELEASE TRANSDERMAL at 08:06

## 2024-08-15 RX ADMIN — METHYLPREDNISOLONE SODIUM SUCCINATE 40 MG: 40 INJECTION, POWDER, FOR SOLUTION INTRAMUSCULAR; INTRAVENOUS at 21:43

## 2024-08-15 ASSESSMENT — COGNITIVE AND FUNCTIONAL STATUS - GENERAL
MOBILITY SCORE: 24
DAILY ACTIVITIY SCORE: 24

## 2024-08-15 ASSESSMENT — PAIN - FUNCTIONAL ASSESSMENT: PAIN_FUNCTIONAL_ASSESSMENT: 0-10

## 2024-08-15 ASSESSMENT — PAIN SCALES - GENERAL
PAINLEVEL_OUTOF10: 3
PAINLEVEL_OUTOF10: 0 - NO PAIN

## 2024-08-15 NOTE — PROGRESS NOTES
"Jono Lott is a 28 y.o. male on day 4 of admission presenting with Mild intermittent asthma with exacerbation ([facility]).    Subjective   He does not feel that his breathing has improved. His chest is still feels very tight and he requires oxygen 2 L NC  with ambulation. He is getting frustrated that he is not feeling better yet. IV steroids were decreased from TID to BID yesterday. He is not sleeping well but does not wish to take sleep aid ordered by primary team. He woke up last night because he could not breath at approximately 11pm. Did require prn duoneb.   The amount of sputum he is coughing up is less, but is still the same gel like consistency and brown color after course of azithromycin.  Norvasc added to control high Bps.  He does normally take GERD medication at home.         Objective     Physical Exam  Constitutional:       Appearance: He is obese.   HENT:      Mouth/Throat:      Mouth: Mucous membranes are moist.      Comments: White exudates seen in posterior throat. Voice is hoarse  Cardiovascular:      Rate and Rhythm: Normal rate and regular rhythm.   Pulmonary:      Effort: Pulmonary effort is normal.      Comments: Diminished breath sounds. Slightly more air movement appreciable than prior exams  Abdominal:      General: Abdomen is flat.      Palpations: Abdomen is soft.   Skin:     General: Skin is warm and dry.   Neurological:      General: No focal deficit present.      Mental Status: He is alert.         Last Recorded Vitals  Blood pressure (!) 165/95, pulse 68, temperature 36.5 °C (97.7 °F), temperature source Temporal, resp. rate 15, height 1.778 m (5' 10\"), weight 102 kg (225 lb), SpO2 98%.  Intake/Output last 3 Shifts:  I/O last 3 completed shifts:  In: 1680 (16.5 mL/kg) [P.O.:1680]  Out: - (0 mL/kg)   Weight: 102.1 kg     Relevant Results  Scheduled medications  amLODIPine, 5 mg, oral, Daily  buPROPion XL, 150 mg, oral, BID  enoxaparin, 40 mg, subcutaneous, q24h  fluticasone, 1 spray, " Each Nostril, Daily  ipratropium-albuteroL, 3 mL, nebulization, q4h  methylPREDNISolone sodium succinate (PF), 40 mg, intravenous, q12h  nicotine, 1 patch, transdermal, Daily  polyethylene glycol, 17 g, oral, Daily      Continuous medications     PRN medications  PRN medications: acetaminophen, calcium carbonate, ipratropium-albuteroL, melatonin, nicotine polacrilex, ondansetron **OR** ondansetron, oxygen, zolpidem    CT Chest 8/13/24  IMPRESSION:  No active disease in the chest.  Mild bibasilar atelectasis, otherwise unremarkable appearance of the  lungs.         Assessment/Plan   Assessment & Plan  Mild intermittent asthma with exacerbation ([facility])  - Suspect 2/2 recent pharyngitis and exertion with high temperatures   - Procal 0.02  - Sputum culture 8/12: 3+ MSSA    - CT chest 8/14 shows no active disease in the chest, mild bibasilar atelectasis, otherwise unremarkable appearance of the lungs  -Pre and Post treatment peak flows attempted by respiratory today. Repeated during exam. His best attempt was 240 lpm    Plan:  - Clotrimazole haider ordered for oral thrush  - Discussed with patient that GERD can be contributing to symptoms of chest tightness. Protonix ordered    - Continue IV solumedrol 40 Mg BID  - Continue scheduled and prn Queta   - Encouraged patient to use incentive spirometer   - Will need maintenance inhaler on discharge in addition to rescue inhaler         Anna Torres DO

## 2024-08-15 NOTE — CARE PLAN
The patient's goals for the shift include      The clinical goals for the shift include Pt will have resp treatments per orders thru end of shift 8/15/24    Pt met goals

## 2024-08-15 NOTE — NURSING NOTE
Pre and Post Peak Flows  Predicted = 613 lpm  Pre Tx = 330 lpm  Post Tx = 260  Poor Efforts. Complains of chest pain with attempts

## 2024-08-15 NOTE — ASSESSMENT & PLAN NOTE
- Suspect 2/2 recent pharyngitis and exertion with high temperatures   - Procal 0.02  - Sputum culture 8/12: 3+ MSSA    - CT chest 8/14 shows no active disease in the chest, mild bibasilar atelectasis, otherwise unremarkable appearance of the lungs  -Pre and Post treatment peak flows attempted by respiratory today. Repeated during exam. His best attempt was 240 lpm    Plan:  - Clotrimazole haider ordered for oral thrush  - Discussed with patient that GERD can be contributing to symptoms of chest tightness. Protonix ordered    - Continue IV solumedrol 40 Mg BID  - Continue scheduled and prn DuoNebs   - Encouraged patient to use incentive spirometer   - Will need maintenance inhaler on discharge in addition to rescue inhaler

## 2024-08-15 NOTE — NURSING NOTE
Shift note -  Pt stable this shift. He has been up walking in the room and able to call appropriately. Pt seen by pulmonary today and meds started. Pt reports no change in how he is feeling. He has been anxious today and looking for change. Pt had a shower per orders and has been able to make his needs known.

## 2024-08-15 NOTE — CARE PLAN
The patient's goals for the shift include      The clinical goals for the shift include remain HDS    Over the shift, the patient did make progress toward the following goals. Pt has been more active this shift attempted to ambulate hallway to get his food for common room refrigerator however did experience increase sob need to take break , recovered well, and able to return to  room which he has been up independent without issues pt has been able to sleep in long intervals and has remained safe this shift

## 2024-08-15 NOTE — PROGRESS NOTES
"Jono Lott is a 28 y.o. male on day 4 of admission presenting with Mild intermittent asthma with exacerbation ([facility]).    Subjective   Patient seen and examined, overnight patient states he had an issue with his breathing.  He states that he felt short of breath and could not get any air out.  He did wear the oxygen 2 L nasal cannula.  His appetite is good.  He appears frustrated at times.  He was able to be wheeled out to see his daughter and girlfriend yesterday which made him happy.  He is coughing at times but does not want any cough medication.       Objective     Physical Exam  Vitals reviewed.   Constitutional:       Appearance: Normal appearance.   HENT:      Head: Normocephalic and atraumatic.      Nose: Nose normal.   Cardiovascular:      Rate and Rhythm: Normal rate and regular rhythm.      Pulses: Normal pulses.      Heart sounds: Normal heart sounds.   Pulmonary:      Breath sounds: No rales.      Comments: Decreased breath sounds on inspiration expiration, very minimal wheezing auscultated bilaterally  Abdominal:      General: Abdomen is flat. Bowel sounds are normal.      Palpations: Abdomen is soft.      Tenderness: There is no abdominal tenderness.   Skin:     General: Skin is warm and dry.   Neurological:      Mental Status: He is alert and oriented to person, place, and time. Mental status is at baseline.   Psychiatric:         Mood and Affect: Mood normal.         Last Recorded Vitals  Blood pressure (!) 165/95, pulse 68, temperature 36.5 °C (97.7 °F), temperature source Temporal, resp. rate 15, height 1.778 m (5' 10\"), weight 102 kg (225 lb), SpO2 98%.  Intake/Output last 3 Shifts:  I/O last 3 completed shifts:  In: 1680 (16.5 mL/kg) [P.O.:1680]  Out: - (0 mL/kg)   Weight: 102.1 kg     Relevant Results  Scheduled medications  amLODIPine, 5 mg, oral, Daily  buPROPion XL, 150 mg, oral, BID  enoxaparin, 40 mg, subcutaneous, q24h  fluticasone, 1 spray, Each Nostril, " Daily  ipratropium-albuteroL, 3 mL, nebulization, q4h  methylPREDNISolone sodium succinate (PF), 40 mg, intravenous, q12h  nicotine, 1 patch, transdermal, Daily  polyethylene glycol, 17 g, oral, Daily      Continuous medications     PRN medications  PRN medications: acetaminophen, calcium carbonate, ipratropium-albuteroL, melatonin, nicotine polacrilex, ondansetron **OR** ondansetron, oxygen, zolpidem  Results from last 7 days   Lab Units 08/13/24  0536 08/12/24  0519 08/11/24  0555   WBC AUTO x10*3/uL 15.9* 15.3* 17.1*   RBC AUTO x10*6/uL 5.28 4.89 4.99   HEMOGLOBIN g/dL 15.5 14.4 15.0     Results from last 7 days   Lab Units 08/13/24  0535 08/12/24  0519 08/11/24  0555 08/10/24  0659 08/09/24  1039   SODIUM mmol/L 135* 135* 137   < > 137   POTASSIUM mmol/L 4.1 4.2 4.2   < > 3.9   CHLORIDE mmol/L 99 101 102   < > 102   CO2 mmol/L 24 24 24   < > 24   BUN mg/dL 26* 23 18   < > 14   CREATININE mg/dL 0.97 0.95 0.89   < > 0.93   CALCIUM mg/dL 9.4 9.0 9.3   < > 10.6*   MAGNESIUM mg/dL  --   --   --   --  1.82   BILIRUBIN TOTAL mg/dL  --   --   --   --  0.3   ALT U/L  --   --   --   --  33   AST U/L  --   --   --   --  17    < > = values in this interval not displayed.       Assessment/Plan   Principal Problem:    Mild intermittent asthma with exacerbation ([facility])  Asthma exacerbation  Acute hypoxic respiratory failure secondary to #1- improving, on 2L NC intermittently today  Leukocytosis suspect reactive secondary to steroids  Tobacco abuse   HTN     Plan:  Check peak flows today  Continue solumed 40 BID per pulm  Continue norvasc 5mg daily for uncontrolled HTN  Nicorette gum ordered, patient experiencing nicotine cravings, patch is also on  CT chest ordered today by pulm- no active process  Dose ambien ordered PRN insomnia since patient is not sleeping well with the steroids  Encourage incentive spirometry  Full code  Vitals per protocol  Continue duonebs every 4 hours  Solumed 40 IVP TID weaned to BID  8/14  Covid/flu negative  Consult pulm- Dr Patel following, decrease solumed from 40 TID to 40 BID  Home meds ordered  Incentive spirometry  AM labs stable no need for repeat  Supportive care  Will follow patient closely, discussed plan with patient, RN and pulm         I spent 35 minutes in the professional and overall care of this patient.      Kamilah Moses, DO

## 2024-08-16 ENCOUNTER — APPOINTMENT (OUTPATIENT)
Dept: RADIOLOGY | Facility: HOSPITAL | Age: 29
End: 2024-08-16

## 2024-08-16 LAB
ANION GAP SERPL CALC-SCNC: 17 MMOL/L (ref 10–20)
ARTERIAL PATENCY WRIST A: POSITIVE
BUN SERPL-MCNC: 32 MG/DL (ref 6–23)
CALCIUM SERPL-MCNC: 9.3 MG/DL (ref 8.6–10.3)
CHLORIDE SERPL-SCNC: 95 MMOL/L (ref 98–107)
CO2 SERPL-SCNC: 24 MMOL/L (ref 21–32)
COHGB MFR BLDA: 1.5 %
CREAT SERPL-MCNC: 0.96 MG/DL (ref 0.5–1.3)
DO-HGB MFR BLDA: 1.6 % (ref 0–5)
EGFRCR SERPLBLD CKD-EPI 2021: >90 ML/MIN/1.73M*2
ERYTHROCYTE [DISTWIDTH] IN BLOOD BY AUTOMATED COUNT: 12 % (ref 11.5–14.5)
GLUCOSE SERPL-MCNC: 214 MG/DL (ref 74–99)
HCT VFR BLD AUTO: 47.6 % (ref 41–52)
HGB BLD-MCNC: 16.5 G/DL (ref 13.5–17.5)
HGB BLDA-MCNC: 16.6 G/DL (ref 13.5–17.5)
MCH RBC QN AUTO: 30 PG (ref 26–34)
MCHC RBC AUTO-ENTMCNC: 34.7 G/DL (ref 32–36)
MCV RBC AUTO: 87 FL (ref 80–100)
METHGB MFR BLDA: 0.8 % (ref 0–1.5)
NRBC BLD-RTO: 0 /100 WBCS (ref 0–0)
OXYHGB MFR BLDA: 96.1 % (ref 94–98)
PLATELET # BLD AUTO: 262 X10*3/UL (ref 150–450)
POTASSIUM SERPL-SCNC: 4.4 MMOL/L (ref 3.5–5.3)
RBC # BLD AUTO: 5.5 X10*6/UL (ref 4.5–5.9)
SODIUM SERPL-SCNC: 132 MMOL/L (ref 136–145)
WBC # BLD AUTO: 19.1 X10*3/UL (ref 4.4–11.3)

## 2024-08-16 PROCEDURE — 2500000001 HC RX 250 WO HCPCS SELF ADMINISTERED DRUGS (ALT 637 FOR MEDICARE OP)

## 2024-08-16 PROCEDURE — 2500000004 HC RX 250 GENERAL PHARMACY W/ HCPCS (ALT 636 FOR OP/ED): Performed by: INTERNAL MEDICINE

## 2024-08-16 PROCEDURE — 99232 SBSQ HOSP IP/OBS MODERATE 35: CPT | Performed by: INTERNAL MEDICINE

## 2024-08-16 PROCEDURE — S4991 NICOTINE PATCH NONLEGEND: HCPCS

## 2024-08-16 PROCEDURE — 70450 CT HEAD/BRAIN W/O DYE: CPT

## 2024-08-16 PROCEDURE — 70450 CT HEAD/BRAIN W/O DYE: CPT | Performed by: RADIOLOGY

## 2024-08-16 PROCEDURE — 85027 COMPLETE CBC AUTOMATED: CPT | Performed by: INTERNAL MEDICINE

## 2024-08-16 PROCEDURE — 2500000001 HC RX 250 WO HCPCS SELF ADMINISTERED DRUGS (ALT 637 FOR MEDICARE OP): Performed by: INTERNAL MEDICINE

## 2024-08-16 PROCEDURE — 36600 WITHDRAWAL OF ARTERIAL BLOOD: CPT

## 2024-08-16 PROCEDURE — 99233 SBSQ HOSP IP/OBS HIGH 50: CPT | Performed by: INTERNAL MEDICINE

## 2024-08-16 PROCEDURE — 94640 AIRWAY INHALATION TREATMENT: CPT

## 2024-08-16 PROCEDURE — 2500000002 HC RX 250 W HCPCS SELF ADMINISTERED DRUGS (ALT 637 FOR MEDICARE OP, ALT 636 FOR OP/ED)

## 2024-08-16 PROCEDURE — 1200000002 HC GENERAL ROOM WITH TELEMETRY DAILY

## 2024-08-16 PROCEDURE — 82375 ASSAY CARBOXYHB QUANT: CPT | Performed by: INTERNAL MEDICINE

## 2024-08-16 PROCEDURE — 36415 COLL VENOUS BLD VENIPUNCTURE: CPT | Performed by: INTERNAL MEDICINE

## 2024-08-16 PROCEDURE — 80048 BASIC METABOLIC PNL TOTAL CA: CPT | Performed by: INTERNAL MEDICINE

## 2024-08-16 RX ORDER — DOXYCYCLINE 100 MG/1
100 CAPSULE ORAL EVERY 12 HOURS SCHEDULED
Status: DISCONTINUED | OUTPATIENT
Start: 2024-08-16 | End: 2024-08-17 | Stop reason: HOSPADM

## 2024-08-16 RX ADMIN — NICOTINE 1 PATCH: 21 PATCH, EXTENDED RELEASE TRANSDERMAL at 09:45

## 2024-08-16 RX ADMIN — BUPROPION HYDROCHLORIDE 150 MG: 150 TABLET, FILM COATED, EXTENDED RELEASE ORAL at 09:45

## 2024-08-16 RX ADMIN — PANTOPRAZOLE SODIUM 40 MG: 40 TABLET, DELAYED RELEASE ORAL at 05:32

## 2024-08-16 RX ADMIN — CLOTRIMAZOLE 10 MG: 10 LOZENGE ORAL; TOPICAL at 14:00

## 2024-08-16 RX ADMIN — FLUTICASONE PROPIONATE 1 SPRAY: 50 SPRAY, METERED NASAL at 09:45

## 2024-08-16 RX ADMIN — DOXYCYCLINE 100 MG: 100 INJECTION, POWDER, LYOPHILIZED, FOR SOLUTION INTRAVENOUS at 14:00

## 2024-08-16 RX ADMIN — IPRATROPIUM BROMIDE AND ALBUTEROL SULFATE 3 ML: 2.5; .5 SOLUTION RESPIRATORY (INHALATION) at 15:02

## 2024-08-16 RX ADMIN — CLOTRIMAZOLE 10 MG: 10 LOZENGE ORAL; TOPICAL at 22:47

## 2024-08-16 RX ADMIN — CLOTRIMAZOLE 10 MG: 10 LOZENGE ORAL; TOPICAL at 05:32

## 2024-08-16 RX ADMIN — IPRATROPIUM BROMIDE AND ALBUTEROL SULFATE 3 ML: 2.5; .5 SOLUTION RESPIRATORY (INHALATION) at 11:28

## 2024-08-16 RX ADMIN — IPRATROPIUM BROMIDE AND ALBUTEROL SULFATE 3 ML: 2.5; .5 SOLUTION RESPIRATORY (INHALATION) at 20:43

## 2024-08-16 RX ADMIN — IPRATROPIUM BROMIDE AND ALBUTEROL SULFATE 3 ML: 2.5; .5 SOLUTION RESPIRATORY (INHALATION) at 01:28

## 2024-08-16 RX ADMIN — IPRATROPIUM BROMIDE AND ALBUTEROL SULFATE 3 ML: 2.5; .5 SOLUTION RESPIRATORY (INHALATION) at 08:19

## 2024-08-16 RX ADMIN — CLOTRIMAZOLE 10 MG: 10 LOZENGE ORAL; TOPICAL at 09:45

## 2024-08-16 RX ADMIN — AMLODIPINE BESYLATE 5 MG: 5 TABLET ORAL at 09:45

## 2024-08-16 RX ADMIN — BUPROPION HYDROCHLORIDE 150 MG: 150 TABLET, FILM COATED, EXTENDED RELEASE ORAL at 22:47

## 2024-08-16 RX ADMIN — IPRATROPIUM BROMIDE AND ALBUTEROL SULFATE 3 ML: 2.5; .5 SOLUTION RESPIRATORY (INHALATION) at 04:57

## 2024-08-16 RX ADMIN — METHYLPREDNISOLONE SODIUM SUCCINATE 40 MG: 40 INJECTION, POWDER, FOR SOLUTION INTRAMUSCULAR; INTRAVENOUS at 09:51

## 2024-08-16 RX ADMIN — DOXYCYCLINE HYCLATE 100 MG: 100 CAPSULE ORAL at 22:47

## 2024-08-16 RX ADMIN — CLOTRIMAZOLE 10 MG: 10 LOZENGE ORAL; TOPICAL at 17:30

## 2024-08-16 RX ADMIN — NICOTINE POLACRILEX 2 MG: 2 GUM, CHEWING BUCCAL at 01:19

## 2024-08-16 RX ADMIN — NICOTINE POLACRILEX 2 MG: 2 GUM, CHEWING BUCCAL at 22:57

## 2024-08-16 ASSESSMENT — PAIN SCALES - GENERAL
PAINLEVEL_OUTOF10: 0 - NO PAIN
PAINLEVEL_OUTOF10: 0 - NO PAIN

## 2024-08-16 NOTE — PROGRESS NOTES
Spiritual Care Visit    Clinical Encounter Type  Visited With: Patient  Routine Visit: Introduction  Continue Visiting: No                                            Taxonomy  Intended Effects: Promote sense of peace, Lessen anxiety, Helping someone feel comforted, Preserve dignity and respect  Methods: Offer support  Interventions: Share words of hope and inspiration    Patient has no Rastafari affiliation.  Patient talked about his wife and kids coming to see him.    listened to his story and was a supportive presence.

## 2024-08-16 NOTE — CARE PLAN
The patient's goals for the shift include      The clinical goals for the shift include pt will report improved breathing    Over the shift, the patient did not make progress toward the following goals. Pt states he feels no better or no worse. Walking minimally. Tolerating diet. States voiding no difficulty and having bm's.

## 2024-08-16 NOTE — ASSESSMENT & PLAN NOTE
- Suspect 2/2 recent pharyngitis and exertion with high temperatures   - Procal 0.02  - Sputum culture 8/12: 3+ MSSA   - Azithromycin course completed    - CT chest 8/14 shows no active disease in the chest, mild bibasilar atelectasis, otherwise unremarkable appearance of the lungs  -Pre and Post treatment peak flows attempted by respiratory 8/15. Repeated during exam 8/15 - His best attempt was 240 lpm  Repeated today 8/16 during exam with best expiratory peak flow 400 lpm and able to inhale 1.5 L with incentive spirometer  -Saturating 97% on RA     Plan:  - ABG COOX ordered and reviewed today to rule out additional causes of shortness of breath such as methemoglobinemia. Normal results given patients smoking history.   - Continue clotrimazole haider for oral thrush  - Continue Protonix to ensure GERD not contributing to chest tightness  - Decrease IV solumedrol to 40 Mg daily  - Continue scheduled and prn DuoNebs   - Encouraged patient to use incentive spirometer   - Will need maintenance inhaler on discharge in addition to rescue inhaler. Can consider trelegy or Symbicort      Discussed with patient that his chest muscles are likely still tired and recovering from exacerbation. Should improve now that he has received steroids. Emphasized importance of compliance with maintain therapy and avoidance of triggers to prevent further exacerbations.

## 2024-08-16 NOTE — CARE PLAN
Problem: Cardiovascular - Adult  Goal: Maintains optimal cardiac output and hemodynamic stability  Outcome: Progressing  Goal: Absence of cardiac dysrhythmias or at baseline  Outcome: Progressing     Problem: Respiratory - Adult  Goal: Achieves optimal ventilation and oxygenation  Outcome: Progressing     Problem: Hematologic - Adult  Goal: Maintains hematologic stability  Outcome: Progressing     Problem: Pain - Adult  Goal: Verbalizes/displays adequate comfort level or baseline comfort level  Outcome: Progressing     Problem: Safety - Adult  Goal: Free from fall injury  Outcome: Progressing     Problem: Discharge Planning  Goal: Discharge to home or other facility with appropriate resources  Outcome: Progressing     Problem: Chronic Conditions and Co-morbidities  Goal: Patient's chronic conditions and co-morbidity symptoms are monitored and maintained or improved  Outcome: Progressing     Problem: Respiratory  Goal: Clear secretions with interventions this shift  Outcome: Progressing  Goal: Minimize anxiety/maximize coping throughout shift  Outcome: Progressing  Goal: Minimal/no exertional discomfort or dyspnea this shift  Outcome: Progressing  Goal: No signs of respiratory distress (eg. Use of accessory muscles. Peds grunting)  Outcome: Progressing  Goal: Patent airway maintained this shift  Outcome: Progressing  Goal: Verbalize decreased shortness of breath this shift  Outcome: Progressing  Goal: Wean oxygen to maintain O2 saturation per order/standard this shift  Outcome: Progressing  Goal: Increase self care and/or family involvement in next 24 hours  Outcome: Progressing   The patient's goals for the shift include      The clinical goals for the shift include Pt gumaro have minimal pain this shift and receive adequate rest.    Over the shift, the patient did have minimal pain and received rest.

## 2024-08-16 NOTE — NURSING NOTE
0030: Pt  B/P- 158/110 heart rate- 76, oxygen saturation- 97 room air, no more trouble breathing than normal for him, feeling dizzy light headed and complaining of facial left sided facial twitching. Also pt head is feeling very heavy at this time. This RN notified Dr. Arcos.     0115: Pt will go down for head CT.     0230: Pt resting comfortably in bed at this time.

## 2024-08-16 NOTE — PROGRESS NOTES
"Jono Lott is a 28 y.o. male on day 5 of admission presenting with Mild intermittent asthma with exacerbation ([facility]).    Subjective   Reports that his symptoms of chest tightness and shortness of breath with exertion have not improved since yesterday. He has been using the incentive spirometer. Protonix was helpful for GERD last night but he does not think it reduced chest tightness. His voice is much less hoarse today. He was able to shower without needed additional duoneb but very tired after the shower. He able to walk around his room with oxygen but does get tired after a short time. Cough improving. He has not coughed yet this morning.   He has been having high BP while on steroids and experienced head pain last night. CT head negative          Objective     Physical Exam  Constitutional:       Appearance: He is obese.   HENT:      Mouth/Throat:      Mouth: Mucous membranes are moist.      Comments: White exudate on left posterior throat  Cardiovascular:      Rate and Rhythm: Normal rate and regular rhythm.   Pulmonary:      Effort: Pulmonary effort is normal.      Comments: Diminished breath sounds. Saturating at 99% on RA  Abdominal:      General: Abdomen is flat.      Palpations: Abdomen is soft.   Skin:     General: Skin is warm and dry.   Neurological:      General: No focal deficit present.      Mental Status: He is alert.         Last Recorded Vitals  Blood pressure (!) 157/95, pulse 83, temperature 36.2 °C (97.2 °F), temperature source Temporal, resp. rate 16, height 1.778 m (5' 10\"), weight 102 kg (225 lb), SpO2 97%.  Intake/Output last 3 Shifts:  I/O last 3 completed shifts:  In: 840 (8.2 mL/kg) [P.O.:840]  Out: - (0 mL/kg)   Weight: 102.1 kg     Relevant Results     Scheduled medications  amLODIPine, 5 mg, oral, Daily  buPROPion XL, 150 mg, oral, BID  clotrimazole, 10 mg, oral, 5x daily  enoxaparin, 40 mg, subcutaneous, q24h  fluticasone, 1 spray, Each Nostril, Daily  ipratropium-albuteroL, 3 " mL, nebulization, q4h  [START ON 8/17/2024] methylPREDNISolone sodium succinate (PF), 40 mg, intravenous, q24h  nicotine, 1 patch, transdermal, Daily  pantoprazole, 40 mg, oral, Daily before breakfast  polyethylene glycol, 17 g, oral, Daily      Continuous medications     PRN medications  PRN medications: acetaminophen, calcium carbonate, ipratropium-albuteroL, melatonin, nicotine polacrilex, [DISCONTINUED] ondansetron **OR** ondansetron, oxygen, zolpidem              Assessment/Plan   Assessment & Plan  Mild intermittent asthma with exacerbation ([facility])  - Suspect 2/2 recent pharyngitis and exertion with high temperatures   - Procal 0.02  - Sputum culture 8/12: 3+ MSSA   - Azithromycin course completed    - CT chest 8/14 shows no active disease in the chest, mild bibasilar atelectasis, otherwise unremarkable appearance of the lungs  -Pre and Post treatment peak flows attempted by respiratory 8/15. Repeated during exam 8/15 - His best attempt was 240 lpm  Repeated today 8/16 during exam with best expiratory peak flow 400 lpm and able to inhale 1.5 L with incentive spirometer  -Saturating 97% on RA     Plan:  - ABG COOX ordered and reviewed today to rule out additional causes of shortness of breath such as methemoglobinemia. Normal results given patients smoking history.   - Continue clotrimazole haider for oral thrush  - Continue Protonix to ensure GERD not contributing to chest tightness  - Decrease IV solumedrol to 40 Mg daily  - Continue scheduled and prn DuoNebs   - Encouraged patient to use incentive spirometer   - Will need maintenance inhaler on discharge in addition to rescue inhaler. Can consider trelegy or Symbicort      Discussed with patient that his chest muscles are likely still tired and recovering from exacerbation. Should improve now that he has received steroids. Emphasized importance of compliance with maintain therapy and avoidance of triggers to prevent further exacerbations.         Anna MCCAIN  Brian,   Family Medicine PGY1

## 2024-08-16 NOTE — PROGRESS NOTES
Jono Lott is a 28 y.o. male on day 5 of admission presenting with Mild intermittent asthma with exacerbation ([facility]).      Subjective   Patient continues to feel very short of breath. States he feels no better than yesterday. Patient says his breathing currently is about 50-55% of his normal breathing ability. Denies new symptoms. He is able to ambulate ~50 ft but then is extremely winded.      Objective     Last Recorded Vitals  BP (!) 157/95 (BP Location: Right arm, Patient Position: Lying)   Pulse 83   Temp 36.2 °C (97.2 °F) (Temporal)   Resp 16   Wt 102 kg (225 lb)   SpO2 97%   Intake/Output last 3 Shifts:  No intake or output data in the 24 hours ending 08/16/24 1242    Admission Weight  Weight: 102 kg (225 lb) (08/09/24 1010)    Daily Weight  08/09/24 : 102 kg (225 lb)    Image Results  CT head wo IV contrast  Narrative: Interpreted By:  Sincere Jean Baptiste,   STUDY:  CT HEAD WO IV CONTRAST;  8/16/2024 2:13 am      INDICATION:  Signs/Symptoms:new onset pressure R side of hea.      COMPARISON:  CT head 07/14/2023      ACCESSION NUMBER(S):  [id number]      ORDERING CLINICIAN:  SAKRIS BARRERA      TECHNIQUE:  Noncontrast axial CT images of head were obtained with coronal and  sagittal reconstructed images.      FINDINGS:  BRAIN PARENCHYMA: Gray-white differentiation is preserved. No  mass-effect, midline shift or effacement of cerebral sulci. No  significant white matter disease.      HEMORRHAGE: No acute intracranial hemorrhage.      VENTRICLES and EXTRA-AXIAL SPACES: The ventricles and sulci are  within normal limits for brain volume. No abnormal extra-axial fluid  collection.      ORBITS: The visualized orbits and globes are within normal limits.      EXTRACRANIAL SOFT TISSUES: Within normal limits.      PARANASAL SINUSES/MASTOIDS: The visualized paranasal sinuses and  mastoid air cells are well aerated.      CALVARIUM: No depressed skull fracture.          Impression: 1. No acute intracranial  abnormality identified.              MACRO:  None      Signed by: Sincere Markel 8/16/2024 2:17 AM  Dictation workstation:   [id number]      Physical Exam  Constitutional:       General: He is not in acute distress.     Appearance: Normal appearance.   HENT:      Head: Normocephalic and atraumatic.      Mouth/Throat:      Mouth: Mucous membranes are moist.   Eyes:      Extraocular Movements: Extraocular movements intact.      Conjunctiva/sclera: Conjunctivae normal.   Cardiovascular:      Rate and Rhythm: Normal rate and regular rhythm.      Heart sounds: Normal heart sounds.   Pulmonary:      Effort: Pulmonary effort is normal.      Breath sounds: Normal breath sounds. No wheezing, rhonchi or rales.   Abdominal:      General: Abdomen is flat. Bowel sounds are normal.      Palpations: Abdomen is soft.   Musculoskeletal:         General: No swelling or tenderness. Normal range of motion.      Cervical back: Normal range of motion and neck supple.   Skin:     General: Skin is warm and dry.      Findings: No rash.   Neurological:      General: No focal deficit present.      Mental Status: He is alert and oriented to person, place, and time.      Cranial Nerves: No cranial nerve deficit.      Sensory: No sensory deficit.   Psychiatric:         Mood and Affect: Mood normal.         Behavior: Behavior normal.         Relevant Results  Scheduled medications  amLODIPine, 5 mg, oral, Daily  buPROPion XL, 150 mg, oral, BID  clotrimazole, 10 mg, oral, 5x daily  enoxaparin, 40 mg, subcutaneous, q24h  fluticasone, 1 spray, Each Nostril, Daily  ipratropium-albuteroL, 3 mL, nebulization, q4h  [START ON 8/17/2024] methylPREDNISolone sodium succinate (PF), 40 mg, intravenous, q24h  nicotine, 1 patch, transdermal, Daily  pantoprazole, 40 mg, oral, Daily before breakfast  polyethylene glycol, 17 g, oral, Daily      Continuous medications     PRN medications  PRN medications: acetaminophen, calcium carbonate, ipratropium-albuteroL,  melatonin, nicotine polacrilex, [DISCONTINUED] ondansetron **OR** ondansetron, oxygen, zolpidem    Results for orders placed or performed during the hospital encounter of 08/09/24 (from the past 24 hour(s))   CBC   Result Value Ref Range    WBC 19.1 (H) 4.4 - 11.3 x10*3/uL    nRBC 0.0 0.0 - 0.0 /100 WBCs    RBC 5.50 4.50 - 5.90 x10*6/uL    Hemoglobin 16.5 13.5 - 17.5 g/dL    Hematocrit 47.6 41.0 - 52.0 %    MCV 87 80 - 100 fL    MCH 30.0 26.0 - 34.0 pg    MCHC 34.7 32.0 - 36.0 g/dL    RDW 12.0 11.5 - 14.5 %    Platelets 262 150 - 450 x10*3/uL   Basic Metabolic Panel   Result Value Ref Range    Glucose 214 (H) 74 - 99 mg/dL    Sodium 132 (L) 136 - 145 mmol/L    Potassium 4.4 3.5 - 5.3 mmol/L    Chloride 95 (L) 98 - 107 mmol/L    Bicarbonate 24 21 - 32 mmol/L    Anion Gap 17 10 - 20 mmol/L    Urea Nitrogen 32 (H) 6 - 23 mg/dL    Creatinine 0.96 0.50 - 1.30 mg/dL    eGFR >90 >60 mL/min/1.73m*2    Calcium 9.3 8.6 - 10.3 mg/dL   COOX PANEL, ARTERIAL   Result Value Ref Range    POCT Hemoglobin, Arterial 16.6 13.5 - 17.5 g/dL    POCT Oxy Hemoglobin, Arterial 96.1 94.0 - 98.0 %    POCT Carboxyhemoglobin, Arterial 1.5 %    POCT Methemoglobin, Arterial 0.8 0.0 - 1.5 %    POCT Deoxy Hemoglobin, Arterial 1.6 0.0 - 5.0 %    Apolinar's Test Positive        CT head wo IV contrast    Result Date: 8/16/2024  Interpreted By:  Sincere Jean Baptiste, STUDY: CT HEAD WO IV CONTRAST;  8/16/2024 2:13 am   INDICATION: Signs/Symptoms:new onset pressure R side of hea.   COMPARISON: CT head 07/14/2023   ACCESSION NUMBER(S): [id number]   ORDERING CLINICIAN: SARKIS BRESSI   TECHNIQUE: Noncontrast axial CT images of head were obtained with coronal and sagittal reconstructed images.   FINDINGS: BRAIN PARENCHYMA: Gray-white differentiation is preserved. No mass-effect, midline shift or effacement of cerebral sulci. No significant white matter disease.   HEMORRHAGE: No acute intracranial hemorrhage.   VENTRICLES and EXTRA-AXIAL SPACES: The ventricles and  sulci are within normal limits for brain volume. No abnormal extra-axial fluid collection.   ORBITS: The visualized orbits and globes are within normal limits.   EXTRACRANIAL SOFT TISSUES: Within normal limits.   PARANASAL SINUSES/MASTOIDS: The visualized paranasal sinuses and mastoid air cells are well aerated.   CALVARIUM: No depressed skull fracture.         1. No acute intracranial abnormality identified.       MACRO: None   Signed by: Sincere Jean Baptiste 8/16/2024 2:17 AM Dictation workstation:   [id number]    CT chest wo IV contrast    Result Date: 8/14/2024  Interpreted By:  Chu Prince, STUDY: CT CHEST WO IV CONTRAST;  8/13/2024 6:18 pm   INDICATION: Signs/Symptoms:asthma exacerbation, rule out other pathologies.   COMPARISON: None.   ACCESSION NUMBER(S): [id number]   ORDERING CLINICIAN: FARHAN MELENDEZ   TECHNIQUE: Helical data acquisition of the chest was obtained  without IV contrast.  Images were reformatted in axial, coronal, and sagittal planes.   FINDINGS: LUNGS and AIRWAYS: There is some respiratory motion which could obscure findings. There is mild bibasilar atelectasis. Otherwise the lungs appear clear and unremarkable. No macroscopic emphysematous or fibrotic changes. Central airways are patent. No bronchiectasis.   No pleural effusion or pneumothorax.   MEDIASTINUM and DANNA, LOWER NECK AND AXILLA: The visualized thyroid gland is grossly unremarkable.   No thoracic lymphadenopathy by CT criteria.   Esophagus is not dilated.   HEART and VESSELS: Heart size is upper limit of normal.   No significant pericardial effusion.   Thoracic aorta is nonaneurysmal.   UPPER ABDOMEN: The visualized subdiaphragmatic structures demonstrate no acute findings on noncontrast images.   CHEST WALL and OSSEOUS STRUCTURES: No acute skeletal abnormalities identified.       No active disease in the chest. Mild bibasilar atelectasis, otherwise unremarkable appearance of the lungs.   MACRO: None.   Signed by: Chu Prince  8/14/2024 8:49 AM Dictation workstation:   [id number]    ECG 12 lead    Result Date: 8/12/2024  Normal sinus rhythm with sinus arrhythmia Normal ECG When compared with ECG of 09-AUG-2024 10:48, (unconfirmed) No significant change was found Confirmed by Forest Rivera ([id number]) on 8/12/2024 8:42:24 AM    ECG 12 lead    Result Date: 8/12/2024  Normal sinus rhythm with sinus arrhythmia Normal ECG When compared with ECG of 08-AUG-2024 11:45, No significant change was found Confirmed by Forest Rivera ([id number]) on 8/12/2024 8:41:44 AM    XR chest 1 view    Result Date: 8/12/2024  Interpreted By:  Delaney Ling, STUDY: XR CHEST 1 VIEW;  8/11/2024 11:16 pm   INDICATION: Signs/Symptoms: Asthma with new productive cough   COMPARISON: Chest x-ray 08/09/2024   ACCESSION NUMBER(S): [id number]   ORDERING CLINICIAN: SARKIS BARRERA   TECHNIQUE: Portable upright frontal view of the chest was obtained .   FINDINGS: Monitoring wires are overlying the patient.   The cardiomediastinal silhouette is within normal limits.   There are bibasilar airspace opacities. No pleural effusion or pneumothorax.       1.  Bibasilar airspace opacities, may be secondary to pneumonia or atelectasis.       MACRO: None.   Signed by: Delaney Ling 8/12/2024 1:19 AM Dictation workstation:   [id number]    ECG 12 lead    Result Date: 8/9/2024  Normal sinus rhythm Cannot rule out Anterior infarct (cited on or before 13-APR-2023) Abnormal ECG When compared with ECG of 13-APR-2023 15:09, No significant change was found See ED provider note for full interpretation and clinical correlation Confirmed by Jesika Benjamin ([id number]) on 8/9/2024 11:41:57 AM    XR chest 1 view    Result Date: 8/9/2024  Interpreted By:  Buddy Manning, STUDY: XR CHEST 1 VIEW; 8/9/2024 11:00 am   INDICATION: Signs/Symptoms:sob   COMPARISON: Radiographs 08/08/2024   ACCESSION NUMBER(S): [id number]   ORDERING CLINICIAN: PREETI SOLIS   TECHNIQUE: Single frontal view of the chest  performed.   FINDINGS: LINES AND DEVICES: None.   LUNGS: No focal consolidation, pulmonary edema, pleural effusion or pneumothorax.   CARDIOMEDIASTINAL SILHOUETTE: The cardiomediastinal silhouette is within normal limits.       No acute pulmonary process.   MACRO None   Signed by: Buddy Manning 8/9/2024 11:16 AM Dictation workstation:   [id number]    XR chest 2 views    Result Date: 8/8/2024  STUDY: Chest Radiographs;  8/8/2024 12:16 PM. INDICATION: Shortness of breath. COMPARISON: Chest radiographs 8/6/2024. ACCESSION NUMBER(S): [id number] ORDERING CLINICIAN: JOHNNY BURROUGHS TECHNIQUE:  Frontal and lateral chest. FINDINGS: CARDIOMEDIASTINAL SILHOUETTE: Cardiomediastinal silhouette is normal in size and configuration.  LUNGS: Lungs are clear.  ABDOMEN: No remarkable upper abdominal findings.  BONES: No acute osseous changes.    No acute pulmonary pathology. Signed by Collin Best MD    XR chest 2 views    Result Date: 8/6/2024  STUDY: Chest Radiographs;  08/06/2024, 12:06PM INDICATION: Cough. COMPARISON: None Available ACCESSION NUMBER(S): [id number] ORDERING CLINICIAN: AUBREY NEVAREZ TECHNIQUE:  Frontal and lateral chest. FINDINGS: CARDIOMEDIASTINAL SILHOUETTE: Cardiomediastinal silhouette is normal in size and configuration.  LUNGS: Lungs are clear.  ABDOMEN: No remarkable upper abdominal findings.  BONES: No acute osseous changes.    No acute process. Signed by Luiz Alegria MD       Assessment/Plan     Assessment & Plan  Mild intermittent asthma with exacerbation ([facility])    Plan:  Continue solumed 40 but reduced to daily today per pulm  Continue norvasc 5mg daily for uncontrolled HTN  Nicorette gum ordered, patient experiencing nicotine cravings, patch is also on  CT chest ordered today by pulm- no active process  Ambien ordered PRN insomnia since patient is not sleeping well with the steroids  Encourage incentive spirometry  Continue duonebs every 4 hours  Covid/flu negative  Home meds  ordered  Incentive spirometry  Sputum culture w/ MSSA sensitive to tetracycline; doxycycline ordered BID starting 8/16    Code: full  DVT PPX: lovenox  GI PPX: not indicated  Diet: regular      Jaret Khan MD

## 2024-08-17 VITALS
SYSTOLIC BLOOD PRESSURE: 147 MMHG | HEART RATE: 90 BPM | DIASTOLIC BLOOD PRESSURE: 83 MMHG | RESPIRATION RATE: 16 BRPM | BODY MASS INDEX: 32.21 KG/M2 | TEMPERATURE: 98.1 F | OXYGEN SATURATION: 96 % | WEIGHT: 225 LBS | HEIGHT: 70 IN

## 2024-08-17 LAB
ANION GAP SERPL CALC-SCNC: 15 MMOL/L (ref 10–20)
BUN SERPL-MCNC: 28 MG/DL (ref 6–23)
CALCIUM SERPL-MCNC: 8.6 MG/DL (ref 8.6–10.3)
CHLORIDE SERPL-SCNC: 97 MMOL/L (ref 98–107)
CO2 SERPL-SCNC: 25 MMOL/L (ref 21–32)
CREAT SERPL-MCNC: 0.88 MG/DL (ref 0.5–1.3)
EGFRCR SERPLBLD CKD-EPI 2021: >90 ML/MIN/1.73M*2
ERYTHROCYTE [DISTWIDTH] IN BLOOD BY AUTOMATED COUNT: 12 % (ref 11.5–14.5)
GLUCOSE SERPL-MCNC: 199 MG/DL (ref 74–99)
HCT VFR BLD AUTO: 45.3 % (ref 41–52)
HGB BLD-MCNC: 15.8 G/DL (ref 13.5–17.5)
MAGNESIUM SERPL-MCNC: 1.93 MG/DL (ref 1.6–2.4)
MCH RBC QN AUTO: 30.3 PG (ref 26–34)
MCHC RBC AUTO-ENTMCNC: 34.9 G/DL (ref 32–36)
MCV RBC AUTO: 87 FL (ref 80–100)
NRBC BLD-RTO: 0 /100 WBCS (ref 0–0)
PLATELET # BLD AUTO: 240 X10*3/UL (ref 150–450)
POTASSIUM SERPL-SCNC: 4 MMOL/L (ref 3.5–5.3)
RBC # BLD AUTO: 5.22 X10*6/UL (ref 4.5–5.9)
SODIUM SERPL-SCNC: 133 MMOL/L (ref 136–145)
WBC # BLD AUTO: 18.8 X10*3/UL (ref 4.4–11.3)

## 2024-08-17 PROCEDURE — 2500000001 HC RX 250 WO HCPCS SELF ADMINISTERED DRUGS (ALT 637 FOR MEDICARE OP): Performed by: INTERNAL MEDICINE

## 2024-08-17 PROCEDURE — S4991 NICOTINE PATCH NONLEGEND: HCPCS

## 2024-08-17 PROCEDURE — 94640 AIRWAY INHALATION TREATMENT: CPT

## 2024-08-17 PROCEDURE — 99239 HOSP IP/OBS DSCHRG MGMT >30: CPT | Performed by: INTERNAL MEDICINE

## 2024-08-17 PROCEDURE — 2500000001 HC RX 250 WO HCPCS SELF ADMINISTERED DRUGS (ALT 637 FOR MEDICARE OP)

## 2024-08-17 PROCEDURE — 2500000002 HC RX 250 W HCPCS SELF ADMINISTERED DRUGS (ALT 637 FOR MEDICARE OP, ALT 636 FOR OP/ED)

## 2024-08-17 PROCEDURE — 85027 COMPLETE CBC AUTOMATED: CPT | Performed by: INTERNAL MEDICINE

## 2024-08-17 PROCEDURE — 36415 COLL VENOUS BLD VENIPUNCTURE: CPT | Performed by: INTERNAL MEDICINE

## 2024-08-17 PROCEDURE — 2500000004 HC RX 250 GENERAL PHARMACY W/ HCPCS (ALT 636 FOR OP/ED): Performed by: INTERNAL MEDICINE

## 2024-08-17 PROCEDURE — 83735 ASSAY OF MAGNESIUM: CPT | Performed by: INTERNAL MEDICINE

## 2024-08-17 PROCEDURE — 80048 BASIC METABOLIC PNL TOTAL CA: CPT | Performed by: INTERNAL MEDICINE

## 2024-08-17 PROCEDURE — RXMED WILLOW AMBULATORY MEDICATION CHARGE

## 2024-08-17 RX ORDER — DOXYCYCLINE 100 MG/1
100 CAPSULE ORAL 2 TIMES DAILY
Qty: 10 CAPSULE | Refills: 0 | Status: SHIPPED | OUTPATIENT
Start: 2024-08-17 | End: 2024-08-22

## 2024-08-17 RX ORDER — IPRATROPIUM BROMIDE AND ALBUTEROL SULFATE 2.5; .5 MG/3ML; MG/3ML
3 SOLUTION RESPIRATORY (INHALATION)
Qty: 300 ML | Refills: 11 | Status: SHIPPED | OUTPATIENT
Start: 2024-08-17

## 2024-08-17 RX ORDER — DOXYCYCLINE 100 MG/1
100 CAPSULE ORAL 2 TIMES DAILY
Qty: 10 CAPSULE | Refills: 0 | Status: SHIPPED | OUTPATIENT
Start: 2024-08-17 | End: 2024-08-24

## 2024-08-17 RX ORDER — IPRATROPIUM BROMIDE AND ALBUTEROL SULFATE 2.5; .5 MG/3ML; MG/3ML
3 SOLUTION RESPIRATORY (INHALATION)
Qty: 300 ML | Refills: 0 | Status: SHIPPED | OUTPATIENT
Start: 2024-08-17 | End: 2024-08-17 | Stop reason: HOSPADM

## 2024-08-17 RX ORDER — PREDNISONE 10 MG/1
TABLET ORAL
Qty: 50 TABLET | Refills: 0 | Status: SHIPPED | OUTPATIENT
Start: 2024-08-17 | End: 2024-08-17 | Stop reason: HOSPADM

## 2024-08-17 RX ORDER — BUDESONIDE AND FORMOTEROL FUMARATE DIHYDRATE 80; 4.5 UG/1; UG/1
2 AEROSOL RESPIRATORY (INHALATION)
Qty: 10.2 G | Refills: 11 | Status: SHIPPED | OUTPATIENT
Start: 2024-08-17 | End: 2024-08-17

## 2024-08-17 RX ORDER — PREDNISONE 10 MG/1
TABLET ORAL
Qty: 50 TABLET | Refills: 0 | Status: SHIPPED | OUTPATIENT
Start: 2024-08-17 | End: 2024-09-08

## 2024-08-17 RX ORDER — BUDESONIDE AND FORMOTEROL FUMARATE DIHYDRATE 80; 4.5 UG/1; UG/1
2 AEROSOL RESPIRATORY (INHALATION)
Qty: 10.2 G | Refills: 11 | Status: SHIPPED | OUTPATIENT
Start: 2024-08-17 | End: 2024-09-16 | Stop reason: DRUGHIGH

## 2024-08-17 RX ADMIN — METHYLPREDNISOLONE SODIUM SUCCINATE 40 MG: 40 INJECTION, POWDER, FOR SOLUTION INTRAMUSCULAR; INTRAVENOUS at 10:08

## 2024-08-17 RX ADMIN — IPRATROPIUM BROMIDE AND ALBUTEROL SULFATE 3 ML: 2.5; .5 SOLUTION RESPIRATORY (INHALATION) at 04:11

## 2024-08-17 RX ADMIN — POLYETHYLENE GLYCOL 3350 17 G: 17 POWDER, FOR SOLUTION ORAL at 10:08

## 2024-08-17 RX ADMIN — CLOTRIMAZOLE 10 MG: 10 LOZENGE ORAL; TOPICAL at 06:24

## 2024-08-17 RX ADMIN — IPRATROPIUM BROMIDE AND ALBUTEROL SULFATE 3 ML: 2.5; .5 SOLUTION RESPIRATORY (INHALATION) at 07:31

## 2024-08-17 RX ADMIN — AMLODIPINE BESYLATE 5 MG: 5 TABLET ORAL at 10:08

## 2024-08-17 RX ADMIN — CLOTRIMAZOLE 10 MG: 10 LOZENGE ORAL; TOPICAL at 10:07

## 2024-08-17 RX ADMIN — IPRATROPIUM BROMIDE AND ALBUTEROL SULFATE 3 ML: 2.5; .5 SOLUTION RESPIRATORY (INHALATION) at 00:07

## 2024-08-17 RX ADMIN — PANTOPRAZOLE SODIUM 40 MG: 40 TABLET, DELAYED RELEASE ORAL at 06:24

## 2024-08-17 RX ADMIN — NICOTINE 1 PATCH: 21 PATCH, EXTENDED RELEASE TRANSDERMAL at 10:07

## 2024-08-17 RX ADMIN — IPRATROPIUM BROMIDE AND ALBUTEROL SULFATE 3 ML: 2.5; .5 SOLUTION RESPIRATORY (INHALATION) at 11:05

## 2024-08-17 RX ADMIN — BUPROPION HYDROCHLORIDE 150 MG: 150 TABLET, FILM COATED, EXTENDED RELEASE ORAL at 10:08

## 2024-08-17 RX ADMIN — DOXYCYCLINE HYCLATE 100 MG: 100 CAPSULE ORAL at 10:07

## 2024-08-17 NOTE — DISCHARGE SUMMARY
Discharge Diagnosis  Mild intermittent asthma with exacerbation ([facility])    Issues Requiring Follow-Up    Discharge Meds     Your medication list        START taking these medications        Instructions Last Dose Given Next Dose Due   budesonide-formoteroL 80-4.5 mcg/actuation inhaler  Commonly known as: Symbicort      Inhale 2 puffs 2 times a day. Rinse mouth with water after use to reduce aftertaste and incidence of candidiasis. Do not swallow.       doxycycline 100 mg capsule  Commonly known as: Vibramycin      Take 1 capsule (100 mg) by mouth 2 times a day for 5 days. Take with at least 8 ounces (large glass) of water, do not lie down for 30 minutes after       ipratropium-albuteroL 0.5-2.5 mg/3 mL nebulizer solution  Commonly known as: Duo-Neb      Take 3 mL by nebulization 4 times a day.              CHANGE how you take these medications        Instructions Last Dose Given Next Dose Due   albuterol 90 mcg/actuation inhaler  What changed: Another medication with the same name was removed. Continue taking this medication, and follow the directions you see here.      Inhale 1-2 puffs every 6 hours if needed for wheezing.       buPROPion  mg 24 hr tablet  Commonly known as: Wellbutrin XL  What changed:   how much to take  how to take this  when to take this      1 by mouth daily in the morning.  After 6 days can increase to 2 by mouth daily.       predniSONE 10 mg tablet  Commonly known as: Deltasone  Start taking on: August 17, 2024  What changed:   medication strength  See the new instructions.  Another medication with the same name was removed. Continue taking this medication, and follow the directions you see here.      Take 4 tablets (40 mg) by mouth once daily for 5 days, THEN 3 tablets (30 mg) once daily for 5 days, THEN 2 tablets (20 mg) once daily for 5 days, THEN 1 tablet (10 mg) once daily for 5 days.              CONTINUE taking these medications        Instructions Last Dose Given Next Dose  Due   fluticasone 50 mcg/actuation nasal spray  Commonly known as: Flonase      Administer 1 spray into each nostril once daily. Shake gently. Before first use, prime pump. After use, clean tip and replace cap.              STOP taking these medications      acetaminophen 500 mg tablet  Commonly known as: Tylenol        albuterol-budesonide 90-80 mcg/actuation inhaler  Commonly known as: Airsupra        azithromycin 500 mg tablet  Commonly known as: Zithromax               ASK your doctor about these medications        Instructions Last Dose Given Next Dose Due   predniSONE 10 mg tablet  Commonly known as: Deltasone  Ask about: Should I take this medication?      Take 5 tablets (50 mg) by mouth once daily for 5 days.                 Where to Get Your Medications        These medications were sent to [id number] - [place], [place] - [address]  [address] , [facility] [address]      Phone: [phone]   budesonide-formoteroL 80-4.5 mcg/actuation inhaler  doxycycline 100 mg capsule  ipratropium-albuteroL 0.5-2.5 mg/3 mL nebulizer solution  predniSONE 10 mg tablet       These medications were sent to [id number] - [facility] [address] [facility] AT [facility] & [address] [address]      Hours: 24-hours Phone: [phone]   predniSONE 10 mg tablet         Test Results Pending At Discharge  Pending Labs       No current pending labs.            Hospital Course   History Of Present Illness  Jono Lott is a 28 y.o. male with past medical history of asthma who presented from home with chief complaint of shortness of breath.  Patient was yesterday seen at [place] emergency room for an asthma exacerbation, he was discharged home on prednisone.   He was Dx with strep throat last week started on azithromycin. Patient states his asthma symptoms have worsened and he has been using his albuterol rescue inhaler several times with no relief,  states 20 times of his rescue inhaler yesterday.  Patient came into the emergency room, has received 3 DuoNeb breathing treatments, IV magnesium and IV Solu-Medrol.  Vitals are stable, he is afebrile.  Patient is 98% on room air.  Overall basic labs are unremarkable, he does have a leukocytosis of 15.8.  Chest x-ray showed no acute process.  Patient will be admitted for asthma exacerbation. He does not have a pulmonologist and is requesting to see one. COVID/FLU negative.     Hospital course: Patient was admitted to the hospital with shortness of breath and diagnosed with a asthma exacerbation.  Patient unfortunately has severe asthma that was very slow to respond to treatment.  Patient was on Solu-Medrol every 8 hours for several days.  Pulmonology was consulted.  Sputum culture was obtained and grew MSSA.  Patient was started on doxycycline for this and being discharged on it as well.  Patient going to be discharged on DuoNebs as needed as well as Symbicort to be taken daily.  Patient also discharged on prednisone taper.  Patient with significant financial difficulty given his hospitalization and not having a paycheck and multiple staff members are currently in the process of trying to figure out how to help him with out of pocket cost for his medications.  Patient is being referred to [facility] which has drug assistance program that he can enroll in as well.  Patient being discharged home in stable condition once the logistics of this are figured out.    Discharge time greater than 35 minutes. Greater than 50% of time spent on counseling patient, coordination of care, medication reconciliation, transmitting medications to pharmacy and clarifying plan of care with nursing staff and case management.    Pertinent Physical Exam At Time of Discharge  Physical Exam  Constitutional:       General: He is not in acute distress.     Appearance: Normal appearance.   HENT:      Head: Normocephalic and atraumatic.    Eyes:      Extraocular Movements: Extraocular movements intact.      Conjunctiva/sclera: Conjunctivae normal.   Cardiovascular:      Rate and Rhythm: Normal rate and regular rhythm.      Heart sounds: Normal heart sounds.   Pulmonary:      Effort: Pulmonary effort is normal.      Breath sounds: Normal breath sounds. No wheezing, rhonchi or rales.      Comments: Improved aeration  Abdominal:      General: Abdomen is flat. Bowel sounds are normal.      Palpations: Abdomen is soft.   Musculoskeletal:         General: No swelling or tenderness. Normal range of motion.      Cervical back: Normal range of motion and neck supple.   Skin:     General: Skin is warm and dry.      Findings: No rash.   Neurological:      General: No focal deficit present.      Mental Status: He is alert and oriented to person, place, and time.      Cranial Nerves: No cranial nerve deficit.      Sensory: No sensory deficit.   Psychiatric:         Mood and Affect: Mood normal.         Behavior: Behavior normal.         Outpatient Follow-Up  No future appointments.      Jaret Khan MD

## 2024-08-17 NOTE — PROGRESS NOTES
"Received message from nursing earlier today that \"patient's insurance wont cover any of his medications that he needs to go home with and he doesn't even have money left for food or anything for his family (he has a 3 year old).\" Suggested contacting pharmacy regarding medications, vouchers and Good [facility]. Gave information for local food pantries and suggested patient's S/O visit the [facility] happening today from 9a-12pm at [address] [place] [address] [phone]. MD to contact [facility] to find out if they offer medication assistance.     1152- Per pharmacy, all 4 of patient's medications will be covered with a $0 copay to patient and pharmacy to deliver meds to beds to patient today prior to discharge.       Indira Shaw RN      "

## 2024-08-19 ENCOUNTER — PATIENT OUTREACH (OUTPATIENT)
Dept: PRIMARY CARE | Facility: CLINIC | Age: 29
End: 2024-08-19

## 2024-08-19 ENCOUNTER — PHARMACY VISIT (OUTPATIENT)
Dept: PHARMACY | Facility: CLINIC | Age: 29
End: 2024-08-19
Payer: COMMERCIAL

## 2024-08-19 NOTE — PROGRESS NOTES
Discharge Facility: [facility]   Discharge Diagnosis: Mild intermittent asthma with exacerbation   Admission Date: 8/9/24  Discharge Date:  8/17/24 - [facility]    PCP Appointment Date: TBD - task to office  Specialist Appointment Date:   - Cesar Ruiz MD (Pulmonary Disease)   Hospital Encounter and Summary Linked: Yes  See discharge assessment below for further details: N/A    Two attempts were made to reach patient within two business days after discharge. Voicemail left with contact information for patient to call back with any non-emergent questions or concerns.

## 2024-08-23 DIAGNOSIS — J45.909 ASTHMA, UNSPECIFIED ASTHMA SEVERITY, UNSPECIFIED WHETHER COMPLICATED, UNSPECIFIED WHETHER PERSISTENT (HHS-HCC): Primary | ICD-10-CM

## 2024-09-04 ENCOUNTER — PATIENT OUTREACH (OUTPATIENT)
Dept: PRIMARY CARE | Facility: CLINIC | Age: 29
End: 2024-09-04

## 2024-09-04 NOTE — PROGRESS NOTES
Patient: Jono Lott    [id number]  : 1995 -- AGE 28 y.o.    Provider: Milka PATTON- CNP     Location [facility]   Service Date: 24            [facility]  New Visit Note    HISTORY OF PRESENT ILLNESS     The patient's referring provider is: No ref. provider found    HISTORY OF PRESENT ILLNESS   Jono Lott is a 28 y.o. male with a history of severe persistent asthma, hypertension, ADHD, who is a former smoker (~ 35pack years), who presents to a [facility] for an initial evaluation for  hospital follow-up for asthma exacerbation.     I have independently interviewed and examined the patient in the office and reviewed available records.    Current History    On today's visit, the patient reports reports a hospital stay 24 for asthma exacerbation.  He was sent home with Symbicort 80.  States he was using Advair a while back but stopped due to not being able to afford.  Has noticed some improvement with using the Symbicort, but stating he uses his DuoNeb 6-7 times a day and sometimes using regular albuterol despite taking prednisone taper, finished prednisone taper 3 days ago.  He reports he was smoking 1-1/2 to 2 packs a day but quit as of 2024.  Did do nicotine and marijuana vaping in the past but has quit that.  Now using Zyn to aid in quitting smoking. also went back to work as a  and states the fumes, brake dust, paint, etc.. aggravate his breathing, has tried wearing respirators but then feels claustrophobic and suffocated.  Has nighttime awakening due to coughing and shortness of breath.    Previous pulmonary history: Asthma since childhood    Inhalers/nebulized medications: symbicort 80 mcg, Advair?  Dose, albuterol and nebulizer    Hospitalization History: 2024    Sleep history: Snores, denies apnea, feeling tired during the day or taking naps during the day.         ALLERGIES AND MEDICATIONS     ALLERGIES  Allergies   Allergen Reactions    Amoxicillin Anaphylaxis    Aspirin Anaphylaxis    Nsaids (Non-Steroidal Anti-Inflammatory Drug) Anaphylaxis    Penicillins Anaphylaxis       MEDICATIONS  Current Outpatient Medications   Medication Sig Dispense Refill    albuterol 90 mcg/actuation inhaler Inhale 1-2 puffs every 6 hours if needed for wheezing. 18 g 0    budesonide-formoteroL (Symbicort) 80-4.5 mcg/actuation inhaler Inhale 2 puffs 2 times a day. Rinse mouth with water after use to reduce aftertaste and incidence of candidiasis. Do not swallow. 10.2 g 11    buPROPion XL (Wellbutrin XL) 150 mg 24 hr tablet 1 by mouth daily in the morning.  After 6 days can increase to 2 by mouth daily. (Patient taking differently: Take 1 tablet (150 mg) by mouth 2 times a day. 1 by mouth daily in the morning.  After 6 days can increase to 2 by mouth daily.) 60 tablet 0    fluticasone (Flonase) 50 mcg/actuation nasal spray Administer 1 spray into each nostril once daily. Shake gently. Before first use, prime pump. After use, clean tip and replace cap. (Patient taking differently: Administer 1 spray into each nostril once daily as needed for allergies. Shake gently. Before first use, prime pump. After use, clean tip and replace cap.) 16 g 0    ipratropium-albuteroL (Duo-Neb) 0.5-2.5 mg/3 mL nebulizer solution Take 3 mL by nebulization 4 times a day. 300 mL 11    predniSONE (Deltasone) 10 mg tablet Take 4 tablets (40 mg) by mouth once daily for 5 days, THEN 3 tablets (30 mg) once daily for 5 days, THEN 2 tablets (20 mg) once daily for 5 days, THEN 1 tablet (10 mg) once daily for 5 days. 50 tablet 0     No current facility-administered medications for this visit.         PAST HISTORY     PAST MEDICAL HISTORY  Asthma-severe persistent  Former smoker  Hypertension   ADHD    PAST SURGICAL HISTORY  No past surgical history on file.    IMMUNIZATION HISTORY  Immunization History   Administered Date(s)  Administered    DTP 1998, 1999, 2000, 2002    HPV 9-valent vaccine (GARDASIL 9) 2016    HPV, Quadrivalent 2011, 2011    Hepatitis A vaccine, pediatric/adolescent (HAVRIX, VAQTA) 2011, 2011    Hepatitis B vaccine, 19 yrs and under (RECOMBIVAX, ENGERIX) 1997, 1998, 1998    HiB, unspecified 1998, 1999    MMR vaccine, subcutaneous (MMR II) 1999, 2000, 2002    Meningococcal ACWY-D (Menactra) 4-valent conjugate vaccine 2011, 2016    Meningococcal B vaccine (BEXSERO) 2016    Polio, Unspecified 1998, 1999, 2000, 2002    Tdap vaccine, age 7 year and older (BOOSTRIX, ADACEL) 2011, 2018    Varicella vaccine, subcutaneous (VARIVAX) 1999, 2000       SOCIAL HISTORY  Tobacco Smokin-27 y/o -1-1/2-2 PPD - ~35-pack-year  Smokeless Tobacco/Vaping: vaping- quit  Illicit drugs: occasional marijuana vape  Alcohol consumption: none  Pets: cats  - unsure of allergic  Used to raise chickens     OCCUPATIONAL/ENVIRONMENTAL HISTORY  Occupation:   Probable exposure to asbestos, silica, beryllium or inhaled metals.  No exposure to birds or exotic animals.    FAMILY HISTORY    No family history of pulmonary disease.  Maternal Great aunt- lung cancer   Paternal greatfather-rheumatoid arthritis     REVIEW OF SYSTEMS     REVIEW OF SYSTEMS  Review of Systems    Constitutional: No fever, no chills, no night sweats.    Eyes: No double vision, no floaters, no dry eyes.   ENT: See HPI.   Neck: No neck stiffness.  Cardiovascular: No sharp chest pain, no heart racing, no leg swelling.  Respiratory: as noted in HPI.   Gastrointestinal: No nausea, no vomiting, no diarrhea.   Musculoskeletal: No joint pain, no back pain.   Integumentary: No rashes or sores.  Neurological: No dizziness, no headaches. Sleeping well.  Psychiatric: No mood changes.   Endocrine: No hot flashes, no  cold intolerance, weight is stable.  Hematologic: No easy bruising or bleeding.    PHYSICAL EXAM     VITAL SIGNS:   Vitals:    09/06/24 1631   BP: (!) 142/96   Pulse: 93   Temp: 36.9 °C (98.4 °F)   SpO2: 97%          CURRENT WEIGHT: Body mass index is 32.57 kg/m².    PREVIOUS WEIGHTS:  Wt Readings from Last 3 Encounters:   08/09/24 102 kg (225 lb)   08/08/24 102 kg (225 lb)   08/06/24 102 kg (225 lb)       Physical Exam    Constitutional: General appearance: Alert and oriented.  No acute distress. Well developed, well nourished.  Head and face: Symmetric  ENT: external inspection of ear and nose normal. No intranasal polyps. No oropharyngeal exudates.    Oropharynx: normal   Neck: supple, no lymphadenopathy  Pulmonary: Chest is normal. No increased work of breathing or signs of respiratory distress. Clear to auscultation bilaterally - no crackles, wheezing, or rhonchi.   Cardiovascular: Heart rate and rhythm normal. Normal S1, S2 - no murmurs, gallops, or pericardial rub.   Abdomen: Soft, non tender, +BS  Extremities: No edema. No clubbing or cyanosis of the fingernails.    Neurologic: Moves all four extremities   MSK: Normal movements of extremities. Gait normal   Psychiatric: Intact judgement and insight.    RESULTS/DATA     Pulmonary Function Test Results     None on record    Chest Radiograph     XR chest 1 view 08/11/2024    Narrative  Interpreted By:  Delaney Ling,  STUDY:  XR CHEST 1 VIEW;  8/11/2024 11:16 pm    INDICATION:  Signs/Symptoms: Asthma with new productive cough    COMPARISON:  Chest x-ray 08/09/2024    ACCESSION NUMBER(S):  [id number]    ORDERING CLINICIAN:  SARKIS BARRERA    TECHNIQUE:  Portable upright frontal view of the chest was obtained .    FINDINGS:  Monitoring wires are overlying the patient.    The cardiomediastinal silhouette is within normal limits.    There are bibasilar airspace opacities. No pleural effusion or  pneumothorax.    Impression  1.  Bibasilar airspace opacities, may  "be secondary to pneumonia or  atelectasis.        MACRO:  None.    Signed by: Delaney Ling 8/12/2024 1:19 AM  Dictation workstation:   [id number]      Chest CT Scan     No results found for this or any previous visit from the past 365 days.      Echocardiogram     No results found for this or any previous visit from the past 365 days.       Labwork     Lab Results   Component Value Date    WBC 18.8 (H) 08/17/2024    HGB 15.8 08/17/2024    HCT 45.3 08/17/2024    MCV 87 08/17/2024     08/17/2024      Lab Results   Component Value Date    GLUCOSE 199 (H) 08/17/2024    CALCIUM 8.6 08/17/2024     (L) 08/17/2024    K 4.0 08/17/2024    CO2 25 08/17/2024    CL 97 (L) 08/17/2024    BUN 28 (H) 08/17/2024    CREATININE 0.88 08/17/2024      Lab Results   Component Value Date    ALT 33 08/09/2024    AST 17 08/09/2024    ALKPHOS 55 08/09/2024    BILITOT 0.3 08/09/2024        No results found for: \"PROTIME\", \"INR\", \"PTT\"    No results found for: \"ICIGE\", \"IGE\", \"ICA04\", \"ASPFU\", \"IGG\", \"IGA\", \"IGM\"    Peripheral Eosinophil Count/Percentage:   Eosinophils Absolute (x10*3/uL)   Date Value   08/09/2024 0.02     Eosinophils % (%)   Date Value   08/09/2024 0.1       ASSESSMENT/PLAN   Mr. Lott is a 28 y.o. male, with a history of severe persistent asthma, hypertension, ADHD, who is a former smoker (~ 35pack years), who presents to a [facility] for an initial evaluation for  hospital follow-up for asthma exacerbation.     Problem List and Orders  Problem List Items Addressed This Visit       Asthma ([facility]) - Primary    Relevant Medications    budesonide-formoteroL (Symbicort) 160-4.5 mcg/actuation inhaler    tiotropium (Spiriva Respimat) 2.5 mcg/actuation inhaler    Other Relevant Orders    CBC and Auto Differential    Respiratory Allergy Profile IgE    Complete Pulmonary Function Test Pre/Post Bronchodialator (Spirometry Pre/Post/DLCO/Lung Volumes)    Exhaled Nitric Oxide (FeNO) "       Assessment and Plan / Recommendations:  Problem List Items Addressed This Visit    None     Asthma  -Start budesonide-formoterol  160 mcg 2 puffs twice daily - rinse mouth after each use  -Start Spiriva 2.52 puffs once daily  - continue albuterol HFA or ipratropium-albuterol nebulizers every 4-6 hours as needed for shortness of breath  -Will get CBC with differential and RASP -in 2 weeks  -Will get PFT/FENO -in 2 weeks      Follow up in 2 months or sooner if needed.    If you have any questions please call the office [phone]    Thank you for visiting the Pulmonary clinic today!   Milka Germain CNP  [phone]

## 2024-09-04 NOTE — PROGRESS NOTES
Call regarding recent hospitalization. Patient has not yet had follow up with PCP.  At time of outreach call the patient feels as if their condition has (improved) since last visit. Patient reports he is scheduled to see Dr. Styles on the 16th.

## 2024-09-06 ENCOUNTER — APPOINTMENT (OUTPATIENT)
Dept: PULMONOLOGY | Facility: CLINIC | Age: 29
End: 2024-09-06

## 2024-09-06 VITALS
OXYGEN SATURATION: 97 % | DIASTOLIC BLOOD PRESSURE: 96 MMHG | BODY MASS INDEX: 32.5 KG/M2 | HEART RATE: 93 BPM | HEIGHT: 70 IN | SYSTOLIC BLOOD PRESSURE: 142 MMHG | WEIGHT: 227 LBS | TEMPERATURE: 98.4 F

## 2024-09-06 DIAGNOSIS — J45.50 SEVERE PERSISTENT ASTHMA WITHOUT COMPLICATION (MULTI): Primary | ICD-10-CM

## 2024-09-06 RX ORDER — TIOTROPIUM BROMIDE INHALATION SPRAY 3.12 UG/1
2 SPRAY, METERED RESPIRATORY (INHALATION) DAILY
Qty: 4 G | Refills: 6 | Status: SHIPPED | OUTPATIENT
Start: 2024-09-06

## 2024-09-06 RX ORDER — BUDESONIDE AND FORMOTEROL FUMARATE DIHYDRATE 160; 4.5 UG/1; UG/1
2 AEROSOL RESPIRATORY (INHALATION)
Qty: 6 G | Refills: 6 | Status: SHIPPED | OUTPATIENT
Start: 2024-09-06

## 2024-09-06 RX ORDER — BUDESONIDE, GLYCOPYRROLATE, AND FORMOTEROL FUMARATE 160; 9; 4.8 UG/1; UG/1; UG/1
2 AEROSOL, METERED RESPIRATORY (INHALATION)
Refills: 3 | Status: CANCELLED | OUTPATIENT
Start: 2024-09-06

## 2024-09-06 ASSESSMENT — ENCOUNTER SYMPTOMS
OCCASIONAL FEELINGS OF UNSTEADINESS: 0
LOSS OF SENSATION IN FEET: 0
DEPRESSION: 0

## 2024-09-06 ASSESSMENT — PATIENT HEALTH QUESTIONNAIRE - PHQ9
2. FEELING DOWN, DEPRESSED OR HOPELESS: NOT AT ALL
1. LITTLE INTEREST OR PLEASURE IN DOING THINGS: NOT AT ALL
SUM OF ALL RESPONSES TO PHQ9 QUESTIONS 1 AND 2: 0

## 2024-09-06 ASSESSMENT — PAIN SCALES - GENERAL: PAINLEVEL: 3

## 2024-09-16 ENCOUNTER — APPOINTMENT (OUTPATIENT)
Dept: PRIMARY CARE | Facility: CLINIC | Age: 29
End: 2024-09-16

## 2024-09-16 VITALS
HEART RATE: 96 BPM | WEIGHT: 228 LBS | SYSTOLIC BLOOD PRESSURE: 138 MMHG | OXYGEN SATURATION: 95 % | DIASTOLIC BLOOD PRESSURE: 92 MMHG | HEIGHT: 70 IN | BODY MASS INDEX: 32.64 KG/M2

## 2024-09-16 DIAGNOSIS — Z13.9 SCREENING FOR CONDITION: ICD-10-CM

## 2024-09-16 DIAGNOSIS — J45.30 MILD PERSISTENT ASTHMA WITHOUT COMPLICATION (HHS-HCC): ICD-10-CM

## 2024-09-16 DIAGNOSIS — M25.572 ACUTE LEFT ANKLE PAIN: ICD-10-CM

## 2024-09-16 DIAGNOSIS — Z79.899 DRUG THERAPY: ICD-10-CM

## 2024-09-16 DIAGNOSIS — F32.A ANXIETY AND DEPRESSION: ICD-10-CM

## 2024-09-16 DIAGNOSIS — Z72.0 NICOTINE USE: ICD-10-CM

## 2024-09-16 DIAGNOSIS — E55.9 VITAMIN D DEFICIENCY: ICD-10-CM

## 2024-09-16 DIAGNOSIS — F41.9 ANXIETY AND DEPRESSION: ICD-10-CM

## 2024-09-16 DIAGNOSIS — Z11.59 NEED FOR HEPATITIS C SCREENING TEST: ICD-10-CM

## 2024-09-16 DIAGNOSIS — Z00.00 PREVENTATIVE HEALTH CARE: ICD-10-CM

## 2024-09-16 DIAGNOSIS — F90.9 ATTENTION DEFICIT HYPERACTIVITY DISORDER (ADHD), UNSPECIFIED ADHD TYPE: Primary | ICD-10-CM

## 2024-09-16 PROCEDURE — 3080F DIAST BP >= 90 MM HG: CPT | Performed by: FAMILY MEDICINE

## 2024-09-16 PROCEDURE — 3075F SYST BP GE 130 - 139MM HG: CPT | Performed by: FAMILY MEDICINE

## 2024-09-16 PROCEDURE — 4004F PT TOBACCO SCREEN RCVD TLK: CPT | Performed by: FAMILY MEDICINE

## 2024-09-16 PROCEDURE — 99215 OFFICE O/P EST HI 40 MIN: CPT | Performed by: FAMILY MEDICINE

## 2024-09-16 PROCEDURE — 3008F BODY MASS INDEX DOCD: CPT | Performed by: FAMILY MEDICINE

## 2024-09-16 RX ORDER — BUPROPION HYDROCHLORIDE 300 MG/1
TABLET ORAL
Qty: 90 TABLET | Refills: 1 | Status: SHIPPED | OUTPATIENT
Start: 2024-09-16

## 2024-09-16 NOTE — PATIENT INSTRUCTIONS
Next annual preventative visit will be in 2025.      -----    Immunization counseling: Most recent Tdap was March 2018.  Will be due for flu shot end of summer.  Due for latest coronavirus booster. -->>  Check with your pharmacy to keep up-to-date on immunizations.    Screening for hepatitis C: will be done with next labs.    ------    Obesity, BMI 32.  Up about 4 pounds over the last 3 months.  Down from a max of 280.  Notes he has been more busy lately, new job as a .  Was off for a while but just started back a couple weeks ago.-->>  Keep up the excellent overall work.      Reviewed previous labs: Previous labs reviewed, all grossly within normal limits.      Drug therapy, screening for conditions, vitamin D deficiency. -->>  recommend starting 10,000 units daily of vitamin D3.  [facility] carries 10,000 units / 250 mcg, $10 bottle has 200 pills.  Will check annual labs before next appointment.      left ankle pain, intermittent, has had this for years.  Previously saw orthopedics, maybe years ago.  2 days ago went to ER, treated with a 60 mg prednisone taper over 15 days.  Not take ibuprofen due to allergies. It is helping symptoms at least a little bit already.  I do not see that uric acid never been tested.  Saw orthopedics, already has a boot. -->> will plan to check uric acid with next labs.        Former smoker, only using nicotine pouches at this time.  Quit about 6 weeks ago.  Feels Wellbutrin helped at least a little.  -->>  Keep up the excellent work.    ADHD, Anxiety with depression, bipolar disorder: Previously had a bad reaction with a med, might have been Depakote, or possibly Paxil. Is now on bupropion/Wellbutrin and has quit smoking as well as it is helping with ADHD symptoms. -->>  Can refill as needed.    Asthma.  Seeing pulmonary medicine.  Had exacerbation, was hospitalized for about 8 days.  Discharged on doxycycline and steroids as well as some new inhalers.  Been back to work  the last 2 weeks or so.  Noticing some worse shortness of breath or wheezing. -->>  Recommend you check your pulse ox whenever you are getting short of breath at work.  Can call your pulmonologist office and tell them how low your pulse ox is getting and ask them if they might want to increase one of your meds because you do not feel you are doing well enough.        -Patient will come in about 6 months from now for annual preventative visit and annual lab review.  -Patient will come in here about a week before the appointment to get fasting annual labs patient will come in here about a week before the appointment to get fasting annual labs including hepatitis C screening and uric acid level.

## 2024-09-16 NOTE — LETTER
September 16, 2024     Patient: Jono Lott   YOB: 1995   Date of Visit: 9/16/2024       To Whom It May Concern:    Jono Lott was seen in my clinic on 9/16/2024 at 2:00 pm. Please excuse Jono for his absence from work on this day to make the appointment.    If you have any questions or concerns, please don't hesitate to call.         Sincerely,         Henry Styles DO        CC: Jono Lott

## 2024-09-16 NOTE — PROGRESS NOTES
"Subjective   Patient ID: Jono Lott is a 28 y.o. male who presents for Hospital FU (Pt in today for [facility] FU.).    Review of Systems  Denies N/V/D/C, no S/V, denies rashes/lesions, no CP/SOB. Denies fevers/chills.  Positive for left ankle pain.  Positive for occasional headaches.  All other systems were negative.     Objective   BP (!) 138/92 (BP Location: Right arm, Patient Position: Sitting)   Pulse 96   Ht 1.778 m (5' 10\")   Wt 103 kg (228 lb)   SpO2 95%   BMI 32.71 kg/m²     Physical Exam  Gen: NAD  eyes: EOMI, PERRLA  ENT: hearing grossly intact, no nasal discharge  resp: CTABL, without R/R  heart: RRR without MRG  GI: abd: S/ND/NT, BS+  lymph: no axillary, cervical, supraclavicular lymphadenopathy noted   MS: gait grossly WNL,  derm: no rashes or lesions noted  neuro: CN II-XII grossly intact  psych: A&Ox3    Assessment/Plan   Problem List Items Addressed This Visit             ICD-10-CM    Asthma ([facility]) J45.909     Other Visit Diagnoses         Codes    Attention deficit hyperactivity disorder (ADHD), unspecified ADHD type    -  Primary F90.9    Nicotine use     Z72.0    Relevant Medications    buPROPion XL (Wellbutrin XL) 300 mg 24 hr tablet    Anxiety and depression     F41.9, F32.A    Relevant Medications    buPROPion XL (Wellbutrin XL) 300 mg 24 hr tablet    Preventative health care     Z00.00    Relevant Medications    buPROPion XL (Wellbutrin XL) 300 mg 24 hr tablet    Drug therapy     Z79.899    Relevant Orders    CBC and Auto Differential    Comprehensive Metabolic Panel    Magnesium    Urinalysis with Reflex Microscopic    Vitamin D deficiency     E55.9    Relevant Orders    Vitamin D 25-Hydroxy,Total (for eval of Vitamin D levels)    Need for hepatitis C screening test     Z11.59    Relevant Orders    Hepatitis C Antibody    Screening for condition     Z13.9    Relevant Orders    TSH with reflex to Free T4 if abnormal    Lipid Panel    Hemoglobin A1C    Acute left ankle pain    "  M25.572    Relevant Orders    Uric acid              Next annual preventative visit will be in 2025.      -----    Immunization counseling: Most recent Tdap was March 2018.  Will be due for flu shot end of summer.  Due for latest coronavirus booster. -->>  Check with your pharmacy to keep up-to-date on immunizations.    Screening for hepatitis C: will be done with next labs.    ------    Obesity, BMI 32.  Up about 4 pounds over the last 3 months.  Down from a max of 280.  Notes he has been more busy lately, new job as a .  Was off for a while but just started back a couple weeks ago.-->>  Keep up the excellent overall work.      Reviewed previous labs: Previous labs reviewed, all grossly within normal limits.      Drug therapy, screening for conditions, vitamin D deficiency. -->>  recommend starting 10,000 units daily of vitamin D3.  [facility] carries 10,000 units / 250 mcg, $10 bottle has 200 pills.  Will check annual labs before next appointment.      left ankle pain, intermittent, has had this for years.  Previously saw orthopedics, maybe years ago.  2 days ago went to ER, treated with a 60 mg prednisone taper over 15 days.  Not take ibuprofen due to allergies. It is helping symptoms at least a little bit already.  I do not see that uric acid never been tested.  Saw orthopedics, already has a boot. -->> will plan to check uric acid with next labs.        Former smoker, only using nicotine pouches at this time.  Quit about 6 weeks ago.  Feels Wellbutrin helped at least a little.  -->>  Keep up the excellent work.    ADHD, Anxiety with depression, bipolar disorder: Previously had a bad reaction with a med, might have been Depakote, or possibly Paxil. Is now on bupropion/Wellbutrin and has quit smoking as well as it is helping with ADHD symptoms. -->>  Can refill as needed.    Asthma.  Seeing pulmonary medicine.  Had exacerbation, was hospitalized for about 8 days.  Discharged on doxycycline and  steroids as well as some new inhalers.  Been back to work the last 2 weeks or so.  Noticing some worse shortness of breath or wheezing. -->>  Recommend you check your pulse ox whenever you are getting short of breath at work.  Can call your pulmonologist office and tell them how low your pulse ox is getting and ask them if they might want to increase one of your meds because you do not feel you are doing well enough.        -Patient will come in about 6 months from now for annual preventative visit and annual lab review.  -Patient will come in here about a week before the appointment to get fasting annual labs patient will come in here about a week before the appointment to get fasting annual labs including hepatitis C screening and uric acid level.

## 2024-10-08 ENCOUNTER — PATIENT OUTREACH (OUTPATIENT)
Dept: PRIMARY CARE | Facility: CLINIC | Age: 29
End: 2024-10-08

## 2024-10-30 ENCOUNTER — PATIENT OUTREACH (OUTPATIENT)
Dept: PRIMARY CARE | Facility: CLINIC | Age: 29
End: 2024-10-30

## 2024-11-01 NOTE — PROGRESS NOTES
Patient: Jono Lott    [id number]  : 1995 -- AGE 28 y.o.    Provider: Milka PATTON- CNP     Location [facility] [place]   Service Date: 24            [facility]  Follow Up Visit Note    HISTORY OF PRESENT ILLNESS     The patient's referring provider is: No ref. provider found    HISTORY OF PRESENT ILLNESS   Jono Lott is a 28 y.o. male with a history of severe persistent asthma, hypertension, ADHD, who is a former smoker (~ 35pack years), who presents to a [facility] for an initial evaluation for  hospital follow-up for asthma exacerbation.     I have independently interviewed and examined the patient in the office and reviewed available records.    Current History    Since last visit he reports starting Symbicort 160 with improvement in breathing he is now needing albuterol HFA 2-3 times a day instead of nebs 60 x 7 times a day.  Did not start Spiriva due to feeling Symbicort was helping.  Has occasional shortness of breath with exertion and stairs as well as occasional wheezing.  He continues to be smoke-free since August!    24: On today's visit, the patient reports reports a hospital stay 24 for asthma exacerbation.  He was sent home with Symbicort 80.  States he was using Advair a while back but stopped due to not being able to afford.  Has noticed some improvement with using the Symbicort, but stating he uses his DuoNeb 6-7 times a day and sometimes using regular albuterol despite taking prednisone taper, finished prednisone taper 3 days ago.  He reports he was smoking 1-1/2 to 2 packs a day but quit as of 2024.  Did do nicotine and marijuana vaping in the past but has quit that.  Now using Zyn to aid in quitting smoking. also went back to work as a  and states the fumes, brake dust, paint, etc.. aggravate his breathing, has tried wearing respirators but then feels  claustrophobic and suffocated.  Has nighttime awakening due to coughing and shortness of breath.    Previous pulmonary history: Asthma since childhood    Inhalers/nebulized medications: symbicort 80 mcg, Advair?  Dose, albuterol and nebulizer    Hospitalization History: August 2024    Sleep history: Snores, denies apnea, feeling tired during the day or taking naps during the day.        ALLERGIES AND MEDICATIONS     ALLERGIES  Allergies   Allergen Reactions    Amoxicillin Anaphylaxis    Aspirin Anaphylaxis    Nsaids (Non-Steroidal Anti-Inflammatory Drug) Anaphylaxis    Penicillins Anaphylaxis       MEDICATIONS  Current Outpatient Medications   Medication Sig Dispense Refill    albuterol 90 mcg/actuation inhaler Inhale 1-2 puffs every 6 hours if needed for wheezing. 18 g 0    budesonide-formoteroL (Symbicort) 160-4.5 mcg/actuation inhaler Inhale 2 puffs 2 times a day. Rinse mouth with water after use to reduce aftertaste and incidence of candidiasis. Do not swallow. 6 g 6    buPROPion XL (Wellbutrin XL) 300 mg 24 hr tablet 1 by mouth daily in the morning. 90 tablet 1    fluticasone (Flonase) 50 mcg/actuation nasal spray Administer 1 spray into each nostril once daily. Shake gently. Before first use, prime pump. After use, clean tip and replace cap. (Patient taking differently: Administer 1 spray into each nostril once daily as needed for allergies. Shake gently. Before first use, prime pump. After use, clean tip and replace cap.) 16 g 0    ipratropium-albuteroL (Duo-Neb) 0.5-2.5 mg/3 mL nebulizer solution Take 3 mL by nebulization 4 times a day. 300 mL 11    tiotropium (Spiriva Respimat) 2.5 mcg/actuation inhaler Inhale 2 puffs once daily. 4 g 6     No current facility-administered medications for this visit.         PAST HISTORY     PAST MEDICAL HISTORY  Asthma-severe persistent  Former smoker  Hypertension   ADHD    PAST SURGICAL HISTORY  No past surgical history on file.    IMMUNIZATION HISTORY  Immunization  History   Administered Date(s) Administered    DTP 1998, 1999, 2000, 2002    HPV 9-valent vaccine (GARDASIL 9) 2016    HPV, Quadrivalent 2011, 2011    Hepatitis A vaccine, pediatric/adolescent (HAVRIX, VAQTA) 2011, 2011    Hepatitis B vaccine, 19 yrs and under (RECOMBIVAX, ENGERIX) 1997, 1998, 1998    HiB, unspecified 1998, 1999    MMR vaccine, subcutaneous (MMR II) 1999, 2000, 2002    Meningococcal ACWY-D (Menactra) 4-valent conjugate vaccine 2011, 2016    Meningococcal B vaccine (BEXSERO) 2016    Polio, Unspecified 1998, 1999, 2000, 2002    Tdap vaccine, age 7 year and older (BOOSTRIX, ADACEL) 2011, 2018    Varicella vaccine, subcutaneous (VARIVAX) 1999, 2000       SOCIAL HISTORY  Tobacco Smokin-29 y/o -1-1/2-2 PPD - ~35-pack-year  Smokeless Tobacco/Vaping: vaping- quit  Illicit drugs: occasional marijuana vape  Alcohol consumption: none  Pets: cats  - unsure of allergic  Used to raise chickens     OCCUPATIONAL/ENVIRONMENTAL HISTORY  Occupation:   Probable exposure to asbestos, silica, beryllium or inhaled metals.  No exposure to birds or exotic animals.    FAMILY HISTORY    No family history of pulmonary disease.  Maternal Great aunt- lung cancer   Paternal greatfather-rheumatoid arthritis     REVIEW OF SYSTEMS     REVIEW OF SYSTEMS  Review of Systems    Constitutional: No fever, no chills, no night sweats.    Eyes: No double vision, no floaters, no dry eyes.   ENT: See HPI.   Neck: No neck stiffness.  Cardiovascular: No sharp chest pain, no heart racing, no leg swelling.  Respiratory: as noted in HPI.   Gastrointestinal: No nausea, no vomiting, no diarrhea.   Musculoskeletal: No joint pain, no back pain.   Integumentary: No rashes or sores.  Neurological: No dizziness, no headaches. Sleeping well.  Psychiatric: No mood changes.    Endocrine: No hot flashes, no cold intolerance, weight is stable.  Hematologic: No easy bruising or bleeding.    PHYSICAL EXAM     VITAL SIGNS:   There were no vitals filed for this visit.         CURRENT WEIGHT: There is no height or weight on file to calculate BMI.    PREVIOUS WEIGHTS:  Wt Readings from Last 3 Encounters:   09/16/24 103 kg (228 lb)   09/06/24 103 kg (227 lb)   08/09/24 102 kg (225 lb)       Physical Exam    Constitutional: General appearance: Alert and oriented.  No acute distress. Well developed, well nourished.  Head and face: Symmetric  ENT: external inspection of ear and nose normal. No intranasal polyps. No oropharyngeal exudates.    Oropharynx: normal   Neck: supple, no lymphadenopathy  Pulmonary: Chest is normal. No increased work of breathing or signs of respiratory distress. Clear to auscultation bilaterally - no crackles, wheezing, or rhonchi.   Cardiovascular: Heart rate and rhythm normal. Normal S1, S2 - no murmurs, gallops, or pericardial rub.   Abdomen: Soft, non tender, +BS  Extremities: No edema. No clubbing or cyanosis of the fingernails.    Neurologic: Moves all four extremities   MSK: Normal movements of extremities. Gait normal   Psychiatric: Intact judgement and insight.    RESULTS/DATA     Pulmonary Function Test Results     Ordered - Not scheduled/Done    Chest Radiograph     XR chest 1 view 08/11/2024    Narrative  Interpreted By:  Delaney Ling,  STUDY:  XR CHEST 1 VIEW;  8/11/2024 11:16 pm    INDICATION:  Signs/Symptoms: Asthma with new productive cough    COMPARISON:  Chest x-ray 08/09/2024    ACCESSION NUMBER(S):  [id number]    ORDERING CLINICIAN:  SARKIS BARRERA    TECHNIQUE:  Portable upright frontal view of the chest was obtained .    FINDINGS:  Monitoring wires are overlying the patient.    The cardiomediastinal silhouette is within normal limits.    There are bibasilar airspace opacities. No pleural effusion or  pneumothorax.    Impression  1.  Bibasilar  airspace opacities, may be secondary to pneumonia or  atelectasis.        MACRO:  None.    Signed by: Delaney Ling 8/12/2024 1:19 AM  Dictation workstation:   [id number]      Chest CT Scan         Study Result    Narrative & Impression   Interpreted By:  Chu Prince,   STUDY:  CT CHEST WO IV CONTRAST;  8/13/2024 6:18 pm      INDICATION:  Signs/Symptoms:asthma exacerbation, rule out other pathologies.      COMPARISON:  None.      ACCESSION NUMBER(S):  [id number]      ORDERING CLINICIAN:  FARHAN MELENDEZ      TECHNIQUE:  Helical data acquisition of the chest was obtained  without IV  contrast.  Images were reformatted in axial, coronal, and sagittal  planes.      FINDINGS:  LUNGS and AIRWAYS:  There is some respiratory motion which could obscure findings. There  is mild bibasilar atelectasis. Otherwise the lungs appear clear and  unremarkable. No macroscopic emphysematous or fibrotic changes.  Central airways are patent. No bronchiectasis.      No pleural effusion or pneumothorax.      MEDIASTINUM and DANNA, LOWER NECK AND AXILLA:  The visualized thyroid gland is grossly unremarkable.      No thoracic lymphadenopathy by CT criteria.      Esophagus is not dilated.      HEART and VESSELS:  Heart size is upper limit of normal.      No significant pericardial effusion.      Thoracic aorta is nonaneurysmal.      UPPER ABDOMEN:  The visualized subdiaphragmatic structures demonstrate no acute  findings on noncontrast images.      CHEST WALL and OSSEOUS STRUCTURES:  No acute skeletal abnormalities identified.      IMPRESSION:  No active disease in the chest.  Mild bibasilar atelectasis, otherwise unremarkable appearance of the  lungs.      MACRO:  None.      Signed by: Chu Prince 8/14/2024 8:49 AM  Dictation workstation:   [id number]         Echocardiogram     No testing done       Labwork     Lab Results   Component Value Date    WBC 18.8 (H) 08/17/2024    HGB 15.8 08/17/2024    HCT 45.3 08/17/2024    MCV 87  "08/17/2024     08/17/2024      Lab Results   Component Value Date    GLUCOSE 199 (H) 08/17/2024    CALCIUM 8.6 08/17/2024     (L) 08/17/2024    K 4.0 08/17/2024    CO2 25 08/17/2024    CL 97 (L) 08/17/2024    BUN 28 (H) 08/17/2024    CREATININE 0.88 08/17/2024      Lab Results   Component Value Date    ALT 33 08/09/2024    AST 17 08/09/2024    ALKPHOS 55 08/09/2024    BILITOT 0.3 08/09/2024        No results found for: \"PROTIME\", \"INR\", \"PTT\"    No results found for: \"ICIGE\", \"IGE\", \"ICA04\", \"ASPFU\", \"IGG\", \"IGA\", \"IGM\"    Peripheral Eosinophil Count/Percentage:   Eosinophils Absolute (x10*3/uL)   Date Value   08/09/2024 0.02     Eosinophils % (%)   Date Value   08/09/2024 0.1       ASSESSMENT/PLAN   Mr. Lott is a 28 y.o. male, with a history of severe persistent asthma, hypertension, ADHD, who is a former smoker (~ 35pack years), who presents to a [facility] for an initial evaluation for  hospital follow-up for asthma exacerbation.     Problem List and Orders  Problem List Items Addressed This Visit    None        Assessment and Plan / Recommendations:  Problem List Items Addressed This Visit    None     Asthma  -Continue budesonide-formoterol  160 mcg 2 puffs twice daily - rinse mouth after each use  -Start Spiriva HandiHaler 18mcg -1 capsule daily   - continue albuterol HFA or ipratropium-albuterol nebulizers every 4-6 hours as needed for shortness of breath  -Will get CBC with differential and RASP -in 2 weeks  -Will get PFT/FENO -in 2 weeks      Follow up in 2 months or sooner if needed.    If you have any questions please call the office [phone]    Thank you for visiting the Pulmonary clinic today!   Milka Germain CNP  [phone]    "

## 2024-11-08 ENCOUNTER — APPOINTMENT (OUTPATIENT)
Dept: PULMONOLOGY | Facility: CLINIC | Age: 29
End: 2024-11-08

## 2024-11-08 VITALS
OXYGEN SATURATION: 97 % | RESPIRATION RATE: 18 BRPM | SYSTOLIC BLOOD PRESSURE: 127 MMHG | HEART RATE: 83 BPM | HEIGHT: 70 IN | BODY MASS INDEX: 30.85 KG/M2 | DIASTOLIC BLOOD PRESSURE: 76 MMHG | WEIGHT: 215.5 LBS | TEMPERATURE: 98.2 F

## 2024-11-08 DIAGNOSIS — J02.9 SORE THROAT: ICD-10-CM

## 2024-11-08 DIAGNOSIS — J45.50 SEVERE PERSISTENT ASTHMA WITHOUT COMPLICATION (MULTI): Primary | ICD-10-CM

## 2024-11-08 PROCEDURE — 99214 OFFICE O/P EST MOD 30 MIN: CPT

## 2024-11-08 PROCEDURE — 3074F SYST BP LT 130 MM HG: CPT

## 2024-11-08 PROCEDURE — 4004F PT TOBACCO SCREEN RCVD TLK: CPT

## 2024-11-08 PROCEDURE — 3008F BODY MASS INDEX DOCD: CPT

## 2024-11-08 PROCEDURE — 3078F DIAST BP <80 MM HG: CPT

## 2024-11-08 RX ORDER — ALBUTEROL SULFATE 90 UG/1
1-2 INHALANT RESPIRATORY (INHALATION) EVERY 6 HOURS PRN
Qty: 18 G | Refills: 0 | Status: SHIPPED | OUTPATIENT
Start: 2024-11-08 | End: 2024-12-08

## 2024-11-08 RX ORDER — TIOTROPIUM BROMIDE 18 UG/1
1 CAPSULE ORAL; RESPIRATORY (INHALATION)
Qty: 30 CAPSULE | Refills: 3 | Status: SHIPPED | OUTPATIENT
Start: 2024-11-08

## 2024-11-08 ASSESSMENT — ASTHMA QUESTIONNAIRES
QUESTION_5 LAST FOUR WEEKS HOW WOULD YOU RATE YOUR ASTHMA CONTROL: POORLY CONTROLLED
QUESTION_1 LAST FOUR WEEKS HOW MUCH OF THE TIME DID YOUR ASTHMA KEEP YOU FROM GETTING AS MUCH DONE AT WORK, SCHOOL OR AT HOME: A LITTLE OF THE TIME
ACT_TOTALSCORE: 10
QUESTION_3 LAST FOUR WEEKS HOW OFTEN DID YOUR ASTHMA SYMPTOMS (WHEEZING, COUGHING, SHORTNESS OF BREATH, CHEST TIGHTNESS OR PAIN) WAKE YOU UP AT NIGHT OR EARLIER THAN USUAL IN THE MORNING: 2-3 NIGHTS A WEEK
QUESTION_2 LAST FOUR WEEKS HOW OFTEN HAVE YOU HAD SHORTNESS OF BREATH: MORE THAN ONCE A DAY
QUESTION_4 LAST FOUR WEEKS HOW OFTEN HAVE YOU USED YOUR RESCUE INHALER OR NEBULIZER MEDICATION (SUCH AS ALBUTEROL): 3 OR MORE TIMES PER DAY

## 2024-11-08 ASSESSMENT — ENCOUNTER SYMPTOMS
DEPRESSION: 0
LOSS OF SENSATION IN FEET: 1

## 2024-11-08 ASSESSMENT — PATIENT HEALTH QUESTIONNAIRE - PHQ9
SUM OF ALL RESPONSES TO PHQ9 QUESTIONS 1 AND 2: 0
2. FEELING DOWN, DEPRESSED OR HOPELESS: NOT AT ALL
1. LITTLE INTEREST OR PLEASURE IN DOING THINGS: NOT AT ALL

## 2024-11-20 ENCOUNTER — TELEPHONE (OUTPATIENT)
Dept: PRIMARY CARE | Facility: CLINIC | Age: 29
End: 2024-11-20

## 2024-11-20 DIAGNOSIS — M25.572 ACUTE LEFT ANKLE PAIN: Primary | ICD-10-CM

## 2024-11-21 DIAGNOSIS — M25.572 ACUTE LEFT ANKLE PAIN: ICD-10-CM

## 2024-11-21 RX ORDER — METHYLPREDNISOLONE 4 MG/1
TABLET ORAL
Qty: 21 TABLET | Refills: 0 | Status: SHIPPED | OUTPATIENT
Start: 2024-11-21 | End: 2024-11-28

## 2024-11-21 RX ORDER — METHYLPREDNISOLONE 4 MG/1
TABLET ORAL
Qty: 21 TABLET | Refills: 0 | Status: SHIPPED | OUTPATIENT
Start: 2024-11-21 | End: 2024-11-21 | Stop reason: SDUPTHER

## 2025-01-10 ENCOUNTER — APPOINTMENT (OUTPATIENT)
Dept: PULMONOLOGY | Facility: CLINIC | Age: 30
End: 2025-01-10

## 2025-03-10 ENCOUNTER — APPOINTMENT (OUTPATIENT)
Dept: PRIMARY CARE | Facility: CLINIC | Age: 30
End: 2025-03-10

## 2025-03-17 ENCOUNTER — APPOINTMENT (OUTPATIENT)
Dept: PRIMARY CARE | Facility: CLINIC | Age: 30
End: 2025-03-17

## 2025-03-31 ENCOUNTER — HOSPITAL ENCOUNTER (EMERGENCY)
Facility: HOSPITAL | Age: 30
Discharge: HOME | End: 2025-03-31
Payer: COMMERCIAL

## 2025-03-31 VITALS
HEIGHT: 70 IN | SYSTOLIC BLOOD PRESSURE: 166 MMHG | BODY MASS INDEX: 30.92 KG/M2 | HEART RATE: 83 BPM | TEMPERATURE: 97.5 F | RESPIRATION RATE: 16 BRPM | OXYGEN SATURATION: 97 % | DIASTOLIC BLOOD PRESSURE: 87 MMHG | WEIGHT: 216 LBS

## 2025-03-31 DIAGNOSIS — M10.172 LEAD-INDUCED ACUTE GOUT OF LEFT FOOT, INITIAL ENCOUNTER: Primary | ICD-10-CM

## 2025-03-31 DIAGNOSIS — B35.3 TINEA PEDIS OF BOTH FEET: ICD-10-CM

## 2025-03-31 DIAGNOSIS — L98.9 FOOT LESION: ICD-10-CM

## 2025-03-31 DIAGNOSIS — T56.0X1A LEAD-INDUCED ACUTE GOUT OF LEFT FOOT, INITIAL ENCOUNTER: Primary | ICD-10-CM

## 2025-03-31 PROCEDURE — 99282 EMERGENCY DEPT VISIT SF MDM: CPT

## 2025-03-31 PROCEDURE — 99284 EMERGENCY DEPT VISIT MOD MDM: CPT

## 2025-03-31 RX ORDER — BACITRACIN ZINC 500 UNIT/G
1 OINTMENT (GRAM) TOPICAL 2 TIMES DAILY
Qty: 14 G | Refills: 0 | Status: SHIPPED | OUTPATIENT
Start: 2025-03-31 | End: 2025-04-07

## 2025-03-31 RX ORDER — OXYCODONE HYDROCHLORIDE 5 MG/1
5 TABLET ORAL EVERY 8 HOURS PRN
Qty: 4 TABLET | Refills: 0 | Status: SHIPPED | OUTPATIENT
Start: 2025-03-31 | End: 2025-04-03

## 2025-03-31 RX ORDER — ACETAMINOPHEN 500 MG
1000 TABLET ORAL EVERY 8 HOURS PRN
Qty: 30 TABLET | Refills: 0 | Status: SHIPPED | OUTPATIENT
Start: 2025-03-31

## 2025-03-31 RX ORDER — MICONAZOLE NITRATE 2 G/100G
POWDER TOPICAL 2 TIMES DAILY
Qty: 85 G | Refills: 0 | Status: SHIPPED | OUTPATIENT
Start: 2025-03-31

## 2025-03-31 RX ORDER — METHYLPREDNISOLONE 4 MG/1
TABLET ORAL
Qty: 21 TABLET | Refills: 0 | Status: SHIPPED | OUTPATIENT
Start: 2025-03-31 | End: 2025-04-06

## 2025-03-31 ASSESSMENT — PAIN - FUNCTIONAL ASSESSMENT: PAIN_FUNCTIONAL_ASSESSMENT: 0-10

## 2025-03-31 ASSESSMENT — LIFESTYLE VARIABLES
TOTAL SCORE: 0
HAVE PEOPLE ANNOYED YOU BY CRITICIZING YOUR DRINKING: NO
EVER FELT BAD OR GUILTY ABOUT YOUR DRINKING: NO
EVER HAD A DRINK FIRST THING IN THE MORNING TO STEADY YOUR NERVES TO GET RID OF A HANGOVER: NO
HAVE YOU EVER FELT YOU SHOULD CUT DOWN ON YOUR DRINKING: NO

## 2025-03-31 ASSESSMENT — PAIN DESCRIPTION - FREQUENCY: FREQUENCY: CONSTANT/CONTINUOUS

## 2025-03-31 ASSESSMENT — PAIN DESCRIPTION - PAIN TYPE: TYPE: CHRONIC PAIN

## 2025-03-31 ASSESSMENT — PAIN SCALES - GENERAL: PAINLEVEL_OUTOF10: 7

## 2025-03-31 ASSESSMENT — PAIN DESCRIPTION - LOCATION: LOCATION: FOOT

## 2025-03-31 ASSESSMENT — PAIN DESCRIPTION - ORIENTATION: ORIENTATION: LEFT

## 2025-03-31 NOTE — ED PROVIDER NOTES
HPI   Chief Complaint   Patient presents with    Foot Pain     Pt  complains of 7/10 left foot pain. Pt has hx of gout. Pain and swelling started last night        Jono Lott is a 29 year old male with PMH of Gout presenting to the ED with left foot pain. Patient states that he has been diagnosed with gout and tendonitis for close to 10 years in his left foot. Believes to be experiencing an acute gout flare since yesterday. He is having difficulty walking on it due to the pain. He states that most of the pain is on the plantar surface of his left foot and 1st MP joint. States that it feels like his usual gout flair. He has tried tylenol with minimal to no relief of pain. Patient works as a  and is no his feet most of the day. Denies f/c, ankle swelling. Has a sore on the medial arch of his left foot but unsure of what caused it. Denies trauma to the foot.              Patient History   Past Medical History:   Diagnosis Date    Asthma     Difficulty breathing 06/26/2024    Comment on above: DIFFICULTY BREATHING    Disease due to severe acute respiratory syndrome coronavirus 2 (SARS-CoV-2) 06/26/2024    Fungal infection 06/26/2024    Headache 06/26/2024    Infection due to fungus 06/26/2024    Lateral epicondylitis 06/26/2024    Personal history of other diseases of the respiratory system 09/03/2021    History of asthma    Vomiting and diarrhea 06/26/2024     History reviewed. No pertinent surgical history.  No family history on file.  Social History     Tobacco Use    Smoking status: Former     Current packs/day: 0.33     Average packs/day: 0.3 packs/day for 18.2 years (6.0 ttl pk-yrs)     Types: Cigarettes     Start date: 2007    Smokeless tobacco: Never   Substance Use Topics    Alcohol use: Yes    Drug use: Yes     Types: Marijuana       Physical Exam   ED Triage Vitals [03/31/25 1242]   Temperature Heart Rate Respirations BP   36.4 °C (97.5 °F) 83 16 166/87      Pulse Ox Temp Source Heart Rate Source  Patient Position   97 % Temporal Monitor --      BP Location FiO2 (%)     -- --       Physical Exam  Vitals and nursing note reviewed.   Constitutional:       General: He is not in acute distress.     Appearance: Normal appearance. He is not ill-appearing.   HENT:      Head: Normocephalic and atraumatic.      Right Ear: External ear normal.      Left Ear: External ear normal.      Nose: Nose normal.      Mouth/Throat:      Mouth: Mucous membranes are moist.   Eyes:      Extraocular Movements: Extraocular movements intact.      Conjunctiva/sclera: Conjunctivae normal.      Pupils: Pupils are equal, round, and reactive to light.   Cardiovascular:      Rate and Rhythm: Normal rate and regular rhythm.      Heart sounds: Normal heart sounds.   Pulmonary:      Effort: No accessory muscle usage or respiratory distress.      Breath sounds: Normal breath sounds. No wheezing, rhonchi or rales.   Abdominal:      General: Abdomen is flat. Bowel sounds are normal. There is no distension.      Palpations: Abdomen is soft.      Tenderness: There is no abdominal tenderness. There is no right CVA tenderness or left CVA tenderness.   Musculoskeletal:         General: No swelling or deformity. Normal range of motion.      Cervical back: Normal range of motion and neck supple.      Right lower leg: No edema.      Left lower leg: No edema.        Feet:    Feet:      Right foot:      Skin integrity: Skin breakdown and dry skin present.      Left foot:      Skin integrity: Skin breakdown and dry skin present.   Skin:     General: Skin is warm and dry.      Capillary Refill: Capillary refill takes less than 2 seconds.   Neurological:      General: No focal deficit present.      Mental Status: He is alert and oriented to person, place, and time.      GCS: GCS eye subscore is 4. GCS verbal subscore is 5. GCS motor subscore is 6.      Cranial Nerves: Cranial nerves 2-12 are intact.      Sensory: No sensory deficit.      Motor: Motor function  "is intact. No weakness.   Psychiatric:         Mood and Affect: Mood and affect normal.         Speech: Speech normal.         Behavior: Behavior normal. Behavior is cooperative.           ED Course & MDM   Diagnoses as of 03/31/25 1349   Lead-induced acute gout of left foot, initial encounter   Tinea pedis of both feet   Foot lesion                 No data recorded     [place] Coma Scale Score: 15 (03/31/25 1244 : Aleksandra Nance, EMT)                           Medical Decision Making  29-year-old male presenting today for left foot pain.  On my exam, left foot without any erythema, swelling.  Does have pain along the first MTP which states is similar to his gout.  Also some mild tenderness along the arch of his foot.  No erythema or swelling to the arch of his foot though does have a small 1 cm abrasion to the medial aspect of his arch, likely due to shoe wear though the patient is not truly sure.  The lesion does not have any drainage, swelling, erythema of itself.  Joint pain in his first MTP does sound like gouty flare.  Pain along his arch of his foot sounds more like plantar fasciitis.  States he has a history of \"tendinitis\" to the arch of his foot though suspect that he likely needs plantar fasciitis.  The skin in between his toes and the base of his bilateral feet appear to be flaking cracked, consistent with bilateral tinea pedis.  Will send home with the miconazole powder.    Will send home Medrol Dosepak for the gout flare.  Discussed scheduled Tylenol for pain.  He is unable to take NSAIDs due to an anaphylactic allergy, Will send in a small amount of oxycodone for breakthrough pain as he is on his feet a large amount of the time, 12 to 14 hours a day at work as a .  Will also send in bacitracin for the abrasion on his foot.        Procedure  Procedures     Juana Montes PA-C  03/31/25 1447    "

## 2025-03-31 NOTE — Clinical Note
Jono Lott was seen and treated in our emergency department on 3/31/2025.  He may return to work on 04/01/2025.       If you have any questions or concerns, please don't hesitate to call.      Juana Montes PA-C

## 2025-06-07 ENCOUNTER — HOSPITAL ENCOUNTER (EMERGENCY)
Facility: HOSPITAL | Age: 30
Discharge: HOME | End: 2025-06-07
Attending: STUDENT IN AN ORGANIZED HEALTH CARE EDUCATION/TRAINING PROGRAM

## 2025-06-07 ENCOUNTER — APPOINTMENT (OUTPATIENT)
Dept: RADIOLOGY | Facility: HOSPITAL | Age: 30
End: 2025-06-07

## 2025-06-07 ENCOUNTER — APPOINTMENT (OUTPATIENT)
Dept: CARDIOLOGY | Facility: HOSPITAL | Age: 30
End: 2025-06-07

## 2025-06-07 VITALS
WEIGHT: 212 LBS | TEMPERATURE: 99.1 F | OXYGEN SATURATION: 95 % | HEART RATE: 91 BPM | RESPIRATION RATE: 18 BRPM | SYSTOLIC BLOOD PRESSURE: 143 MMHG | BODY MASS INDEX: 30.35 KG/M2 | HEIGHT: 70 IN | DIASTOLIC BLOOD PRESSURE: 90 MMHG

## 2025-06-07 DIAGNOSIS — F10.920 ALCOHOLIC INTOXICATION WITHOUT COMPLICATION: Primary | ICD-10-CM

## 2025-06-07 LAB
ALBUMIN SERPL BCP-MCNC: 4.9 G/DL (ref 3.4–5)
ALP SERPL-CCNC: 55 U/L (ref 33–120)
ALT SERPL W P-5'-P-CCNC: 42 U/L (ref 10–52)
AMPHETAMINES UR QL SCN: NORMAL
ANION GAP SERPL CALC-SCNC: 15 MMOL/L (ref 10–20)
APAP SERPL-MCNC: <10 UG/ML (ref ?–30)
AST SERPL W P-5'-P-CCNC: 26 U/L (ref 9–39)
ATRIAL RATE: 93 BPM
BARBITURATES UR QL SCN: NORMAL
BASOPHILS # BLD AUTO: 0.04 X10*3/UL (ref 0–0.1)
BASOPHILS NFR BLD AUTO: 0.4 %
BENZODIAZ UR QL SCN: NORMAL
BILIRUB SERPL-MCNC: 0.3 MG/DL (ref 0–1.2)
BUN SERPL-MCNC: 14 MG/DL (ref 6–23)
BZE UR QL SCN: NORMAL
CALCIUM SERPL-MCNC: 9.9 MG/DL (ref 8.6–10.3)
CANNABINOIDS UR QL SCN: NORMAL
CHLORIDE SERPL-SCNC: 106 MMOL/L (ref 98–107)
CO2 SERPL-SCNC: 23 MMOL/L (ref 21–32)
CREAT SERPL-MCNC: 1.05 MG/DL (ref 0.5–1.3)
EGFRCR SERPLBLD CKD-EPI 2021: >90 ML/MIN/1.73M*2
EOSINOPHIL # BLD AUTO: 0.15 X10*3/UL (ref 0–0.7)
EOSINOPHIL NFR BLD AUTO: 1.6 %
ERYTHROCYTE [DISTWIDTH] IN BLOOD BY AUTOMATED COUNT: 12.3 % (ref 11.5–14.5)
ETHANOL SERPL-MCNC: 223 MG/DL
FENTANYL+NORFENTANYL UR QL SCN: NORMAL
GLUCOSE SERPL-MCNC: 121 MG/DL (ref 74–99)
HCT VFR BLD AUTO: 45.6 % (ref 41–52)
HGB BLD-MCNC: 16.4 G/DL (ref 13.5–17.5)
IMM GRANULOCYTES # BLD AUTO: 0.05 X10*3/UL (ref 0–0.7)
IMM GRANULOCYTES NFR BLD AUTO: 0.5 % (ref 0–0.9)
LYMPHOCYTES # BLD AUTO: 3.03 X10*3/UL (ref 1.2–4.8)
LYMPHOCYTES NFR BLD AUTO: 32.8 %
MCH RBC QN AUTO: 30.5 PG (ref 26–34)
MCHC RBC AUTO-ENTMCNC: 36 G/DL (ref 32–36)
MCV RBC AUTO: 85 FL (ref 80–100)
METHADONE UR QL SCN: NORMAL
MONOCYTES # BLD AUTO: 0.57 X10*3/UL (ref 0.1–1)
MONOCYTES NFR BLD AUTO: 6.2 %
NEUTROPHILS # BLD AUTO: 5.39 X10*3/UL (ref 1.2–7.7)
NEUTROPHILS NFR BLD AUTO: 58.5 %
NRBC BLD-RTO: 0 /100 WBCS (ref 0–0)
OPIATES UR QL SCN: NORMAL
OXYCODONE+OXYMORPHONE UR QL SCN: NORMAL
P AXIS: 58 DEGREES
P OFFSET: 176 MS
P ONSET: 121 MS
PCP UR QL SCN: NORMAL
PLATELET # BLD AUTO: 206 X10*3/UL (ref 150–450)
POTASSIUM SERPL-SCNC: 3.4 MMOL/L (ref 3.5–5.3)
PR INTERVAL: 184 MS
PROT SERPL-MCNC: 7.4 G/DL (ref 6.4–8.2)
Q ONSET: 213 MS
QRS COUNT: 15 BEATS
QRS DURATION: 102 MS
QT INTERVAL: 362 MS
QTC CALCULATION(BAZETT): 450 MS
QTC FREDERICIA: 419 MS
R AXIS: 63 DEGREES
RBC # BLD AUTO: 5.37 X10*6/UL (ref 4.5–5.9)
SALICYLATES SERPL-MCNC: <3 MG/DL (ref ?–20)
SODIUM SERPL-SCNC: 141 MMOL/L (ref 136–145)
T AXIS: 62 DEGREES
T OFFSET: 394 MS
VENTRICULAR RATE: 93 BPM
WBC # BLD AUTO: 9.2 X10*3/UL (ref 4.4–11.3)

## 2025-06-07 PROCEDURE — 85025 COMPLETE CBC W/AUTO DIFF WBC: CPT | Performed by: STUDENT IN AN ORGANIZED HEALTH CARE EDUCATION/TRAINING PROGRAM

## 2025-06-07 PROCEDURE — 80320 DRUG SCREEN QUANTALCOHOLS: CPT | Performed by: STUDENT IN AN ORGANIZED HEALTH CARE EDUCATION/TRAINING PROGRAM

## 2025-06-07 PROCEDURE — 84075 ASSAY ALKALINE PHOSPHATASE: CPT | Performed by: STUDENT IN AN ORGANIZED HEALTH CARE EDUCATION/TRAINING PROGRAM

## 2025-06-07 PROCEDURE — 36415 COLL VENOUS BLD VENIPUNCTURE: CPT | Performed by: STUDENT IN AN ORGANIZED HEALTH CARE EDUCATION/TRAINING PROGRAM

## 2025-06-07 PROCEDURE — 80307 DRUG TEST PRSMV CHEM ANLYZR: CPT | Performed by: STUDENT IN AN ORGANIZED HEALTH CARE EDUCATION/TRAINING PROGRAM

## 2025-06-07 PROCEDURE — 99285 EMERGENCY DEPT VISIT HI MDM: CPT | Mod: 25 | Performed by: STUDENT IN AN ORGANIZED HEALTH CARE EDUCATION/TRAINING PROGRAM

## 2025-06-07 PROCEDURE — 70450 CT HEAD/BRAIN W/O DYE: CPT | Performed by: SURGERY

## 2025-06-07 PROCEDURE — 80143 DRUG ASSAY ACETAMINOPHEN: CPT | Performed by: STUDENT IN AN ORGANIZED HEALTH CARE EDUCATION/TRAINING PROGRAM

## 2025-06-07 PROCEDURE — 93005 ELECTROCARDIOGRAM TRACING: CPT

## 2025-06-07 PROCEDURE — 70450 CT HEAD/BRAIN W/O DYE: CPT

## 2025-06-07 PROCEDURE — 80053 COMPREHEN METABOLIC PANEL: CPT | Performed by: STUDENT IN AN ORGANIZED HEALTH CARE EDUCATION/TRAINING PROGRAM

## 2025-06-07 ASSESSMENT — LIFESTYLE VARIABLES
EVER HAD A DRINK FIRST THING IN THE MORNING TO STEADY YOUR NERVES TO GET RID OF A HANGOVER: NO
TOTAL SCORE: 0
HAVE YOU EVER FELT YOU SHOULD CUT DOWN ON YOUR DRINKING: NO
HAVE PEOPLE ANNOYED YOU BY CRITICIZING YOUR DRINKING: NO
EVER FELT BAD OR GUILTY ABOUT YOUR DRINKING: NO

## 2025-06-07 ASSESSMENT — PAIN SCALES - GENERAL
PAINLEVEL_OUTOF10: 0 - NO PAIN
PAINLEVEL_OUTOF10: 0 - NO PAIN

## 2025-06-07 ASSESSMENT — PAIN - FUNCTIONAL ASSESSMENT: PAIN_FUNCTIONAL_ASSESSMENT: 0-10
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
## 2025-06-23 ENCOUNTER — APPOINTMENT (OUTPATIENT)
Dept: RADIOLOGY | Facility: HOSPITAL | Age: 30
End: 2025-06-23

## 2025-06-23 ENCOUNTER — HOSPITAL ENCOUNTER (EMERGENCY)
Facility: HOSPITAL | Age: 30
Discharge: HOME | End: 2025-06-23
Attending: STUDENT IN AN ORGANIZED HEALTH CARE EDUCATION/TRAINING PROGRAM

## 2025-06-23 VITALS
DIASTOLIC BLOOD PRESSURE: 100 MMHG | RESPIRATION RATE: 16 BRPM | HEART RATE: 94 BPM | TEMPERATURE: 97.9 F | HEIGHT: 70 IN | OXYGEN SATURATION: 98 % | BODY MASS INDEX: 31.5 KG/M2 | SYSTOLIC BLOOD PRESSURE: 159 MMHG | WEIGHT: 220 LBS

## 2025-06-23 DIAGNOSIS — M10.9 ACUTE GOUT INVOLVING TOE OF RIGHT FOOT, UNSPECIFIED CAUSE: Primary | ICD-10-CM

## 2025-06-23 PROCEDURE — 73630 X-RAY EXAM OF FOOT: CPT | Mod: RIGHT SIDE | Performed by: STUDENT IN AN ORGANIZED HEALTH CARE EDUCATION/TRAINING PROGRAM

## 2025-06-23 PROCEDURE — 99283 EMERGENCY DEPT VISIT LOW MDM: CPT | Performed by: STUDENT IN AN ORGANIZED HEALTH CARE EDUCATION/TRAINING PROGRAM

## 2025-06-23 PROCEDURE — 2500000001 HC RX 250 WO HCPCS SELF ADMINISTERED DRUGS (ALT 637 FOR MEDICARE OP): Performed by: STUDENT IN AN ORGANIZED HEALTH CARE EDUCATION/TRAINING PROGRAM

## 2025-06-23 PROCEDURE — 73630 X-RAY EXAM OF FOOT: CPT | Mod: RT

## 2025-06-23 RX ORDER — OXYCODONE AND ACETAMINOPHEN 5; 325 MG/1; MG/1
1 TABLET ORAL ONCE
Refills: 0 | Status: COMPLETED | OUTPATIENT
Start: 2025-06-23 | End: 2025-06-23

## 2025-06-23 RX ORDER — OXYCODONE AND ACETAMINOPHEN 5; 325 MG/1; MG/1
1 TABLET ORAL EVERY 8 HOURS PRN
Qty: 8 TABLET | Refills: 0 | Status: SHIPPED | OUTPATIENT
Start: 2025-06-23 | End: 2025-06-26

## 2025-06-23 RX ORDER — PREDNISONE 20 MG/1
40 TABLET ORAL DAILY
Qty: 12 TABLET | Refills: 0 | Status: SHIPPED | OUTPATIENT
Start: 2025-06-23 | End: 2025-06-29

## 2025-06-23 RX ADMIN — OXYCODONE HYDROCHLORIDE AND ACETAMINOPHEN 1 TABLET: 5; 325 TABLET ORAL at 05:34

## 2025-06-23 ASSESSMENT — PAIN - FUNCTIONAL ASSESSMENT: PAIN_FUNCTIONAL_ASSESSMENT: 0-10

## 2025-06-23 ASSESSMENT — PAIN SCALES - GENERAL: PAINLEVEL_OUTOF10: 5 - MODERATE PAIN

## 2025-06-23 ASSESSMENT — PAIN DESCRIPTION - ORIENTATION: ORIENTATION: RIGHT

## 2025-06-23 ASSESSMENT — PAIN DESCRIPTION - FREQUENCY: FREQUENCY: CONSTANT/CONTINUOUS

## 2025-06-23 ASSESSMENT — PAIN DESCRIPTION - DESCRIPTORS: DESCRIPTORS: SHARP;RADIATING

## 2025-06-23 ASSESSMENT — PAIN DESCRIPTION - LOCATION: LOCATION: FOOT

## 2025-06-23 ASSESSMENT — PAIN DESCRIPTION - PAIN TYPE: TYPE: ACUTE PAIN

## 2025-06-23 NOTE — ED PROVIDER NOTES
HPI   Chief Complaint   Patient presents with    Foot Pain     I have gout and the pain started in my right big toe, but it's traveling up to my knee and I couldn't even sleep the pain is so bad       Patient is a 29-year-old male with past medical history of gout presents the ED for right foot pain.  Patient states that 1 month ago he fell on his right foot and since then he has been having pain but is just been getting worse instead of getting better.  He states that he feels like it is different than gout pain he has had in the past.  It is primarily near the base of his great toe but radiates up above the ankle.  He did state he tried taking Tylenol as well as leftover prednisone from previous gout flares but does not feel like it improved.              Patient History   Medical History[1]  Surgical History[2]  Family History[3]  Social History[4]    Physical Exam   ED Triage Vitals [06/23/25 0429]   Temperature Heart Rate Respirations BP   36.6 °C (97.9 °F) 94 16 (!) 159/100      Pulse Ox Temp Source Heart Rate Source Patient Position   98 % Temporal Monitor Sitting      BP Location FiO2 (%)     Right arm --       Physical Exam  Vitals and nursing note reviewed.   Constitutional:       General: He is not in acute distress.     Appearance: He is not toxic-appearing.   Cardiovascular:      Rate and Rhythm: Normal rate and regular rhythm.      Pulses: Normal pulses.      Heart sounds: No murmur heard.     No gallop.   Pulmonary:      Effort: Pulmonary effort is normal. No respiratory distress.      Breath sounds: Normal breath sounds. No wheezing or rales.   Musculoskeletal:      Comments: Tenderness of the MTP joint of the right great toe as well as along the first metatarsal.  Range of motion of the right knee and ankle intact.  No erythema or heat to the touch.  No ecchymosis or deformity.   Skin:     General: Skin is warm and dry.   Neurological:      Mental Status: He is alert.           ED Course & MDM    Diagnoses as of 06/23/25 0552   Acute gout involving toe of right foot, unspecified cause                 No data recorded     Nan Coma Scale Score: 15 (06/23/25 0429 : Rosalba Joshua RN)                           Medical Decision Making  Patient is nontoxic.  In no distress.  He is very tender around his first MTP joint.  No erythema or heat.  No signs of infection.  He is neurovascularly intact.  He feels this is flared up after falling out of so we will obtain an x-ray to evaluate for fracture.  Patient was given a dose of Percocet for pain.    X-ray shows no acute osseous process.  I discussed this finding with the patient and that I am more suspicious of it being gout with his location and his history of gout.  We discussed the plan of treatment with a course of prednisone as well as a short course of oral pain medication.  We discussed need for close follow-up with primary care physician.  Patient is understanding agreement this plan was discharged in stable condition.        Procedure  Procedures       [1]   Past Medical History:  Diagnosis Date    Asthma     Difficulty breathing 06/26/2024    Comment on above: DIFFICULTY BREATHING    Disease due to severe acute respiratory syndrome coronavirus 2 (SARS-CoV-2) 06/26/2024    Fungal infection 06/26/2024    Headache 06/26/2024    Infection due to fungus 06/26/2024    Lateral epicondylitis 06/26/2024    Personal history of other diseases of the respiratory system 09/03/2021    History of asthma    Vomiting and diarrhea 06/26/2024   [2] No past surgical history on file.  [3] No family history on file.  [4]   Social History  Tobacco Use    Smoking status: Former     Current packs/day: 0.33     Average packs/day: 0.3 packs/day for 18.5 years (6.1 ttl pk-yrs)     Types: Cigarettes     Start date: 2007    Smokeless tobacco: Never   Substance Use Topics    Alcohol use: Yes    Drug use: Yes     Types: Marijuana        Tj Crooks DO  06/23/25 0553

## 2025-06-27 LAB
ATRIAL RATE: 93 BPM
P AXIS: 58 DEGREES
P OFFSET: 176 MS
P ONSET: 121 MS
PR INTERVAL: 184 MS
Q ONSET: 213 MS
QRS COUNT: 15 BEATS
QRS DURATION: 102 MS
QT INTERVAL: 362 MS
QTC CALCULATION(BAZETT): 450 MS
QTC FREDERICIA: 419 MS
R AXIS: 63 DEGREES
T AXIS: 62 DEGREES
T OFFSET: 394 MS
VENTRICULAR RATE: 93 BPM

## 2025-06-30 ENCOUNTER — APPOINTMENT (OUTPATIENT)
Facility: CLINIC | Age: 30
End: 2025-06-30

## 2025-07-10 ENCOUNTER — APPOINTMENT (OUTPATIENT)
Facility: CLINIC | Age: 30
End: 2025-07-10

## 2025-07-30 ENCOUNTER — HOSPITAL ENCOUNTER (EMERGENCY)
Facility: HOSPITAL | Age: 30
Discharge: HOME | End: 2025-07-31
Attending: STUDENT IN AN ORGANIZED HEALTH CARE EDUCATION/TRAINING PROGRAM
Payer: COMMERCIAL

## 2025-07-30 ENCOUNTER — APPOINTMENT (OUTPATIENT)
Dept: CARDIOLOGY | Facility: HOSPITAL | Age: 30
End: 2025-07-30
Payer: COMMERCIAL

## 2025-07-30 VITALS
TEMPERATURE: 99 F | DIASTOLIC BLOOD PRESSURE: 95 MMHG | OXYGEN SATURATION: 96 % | HEART RATE: 95 BPM | WEIGHT: 223 LBS | BODY MASS INDEX: 31.92 KG/M2 | SYSTOLIC BLOOD PRESSURE: 151 MMHG | HEIGHT: 70 IN | RESPIRATION RATE: 18 BRPM

## 2025-07-30 DIAGNOSIS — F32.A DEPRESSION, UNSPECIFIED DEPRESSION TYPE: Primary | ICD-10-CM

## 2025-07-30 LAB
ALBUMIN SERPL BCP-MCNC: 4.7 G/DL (ref 3.4–5)
ALP SERPL-CCNC: 58 U/L (ref 33–120)
ALT SERPL W P-5'-P-CCNC: 39 U/L (ref 10–52)
ANION GAP SERPL CALC-SCNC: 15 MMOL/L (ref 10–20)
APAP SERPL-MCNC: <10 UG/ML (ref ?–30)
AST SERPL W P-5'-P-CCNC: 25 U/L (ref 9–39)
BASOPHILS # BLD AUTO: 0.02 X10*3/UL (ref 0–0.1)
BASOPHILS NFR BLD AUTO: 0.2 %
BILIRUB SERPL-MCNC: 0.5 MG/DL (ref 0–1.2)
BUN SERPL-MCNC: 9 MG/DL (ref 6–23)
CALCIUM SERPL-MCNC: 9.6 MG/DL (ref 8.6–10.3)
CHLORIDE SERPL-SCNC: 104 MMOL/L (ref 98–107)
CO2 SERPL-SCNC: 23 MMOL/L (ref 21–32)
CREAT SERPL-MCNC: 1.08 MG/DL (ref 0.5–1.3)
EGFRCR SERPLBLD CKD-EPI 2021: >90 ML/MIN/1.73M*2
EOSINOPHIL # BLD AUTO: 0.19 X10*3/UL (ref 0–0.7)
EOSINOPHIL NFR BLD AUTO: 1.7 %
ERYTHROCYTE [DISTWIDTH] IN BLOOD BY AUTOMATED COUNT: 11.9 % (ref 11.5–14.5)
ETHANOL SERPL-MCNC: <10 MG/DL
GLUCOSE SERPL-MCNC: 95 MG/DL (ref 74–99)
HCT VFR BLD AUTO: 44.7 % (ref 41–52)
HGB BLD-MCNC: 16 G/DL (ref 13.5–17.5)
IMM GRANULOCYTES # BLD AUTO: 0.03 X10*3/UL (ref 0–0.7)
IMM GRANULOCYTES NFR BLD AUTO: 0.3 % (ref 0–0.9)
LYMPHOCYTES # BLD AUTO: 3.33 X10*3/UL (ref 1.2–4.8)
LYMPHOCYTES NFR BLD AUTO: 29.6 %
MCH RBC QN AUTO: 30.5 PG (ref 26–34)
MCHC RBC AUTO-ENTMCNC: 35.8 G/DL (ref 32–36)
MCV RBC AUTO: 85 FL (ref 80–100)
MONOCYTES # BLD AUTO: 0.65 X10*3/UL (ref 0.1–1)
MONOCYTES NFR BLD AUTO: 5.8 %
NEUTROPHILS # BLD AUTO: 7.02 X10*3/UL (ref 1.2–7.7)
NEUTROPHILS NFR BLD AUTO: 62.4 %
NRBC BLD-RTO: 0 /100 WBCS (ref 0–0)
PLATELET # BLD AUTO: 242 X10*3/UL (ref 150–450)
POTASSIUM SERPL-SCNC: 3.4 MMOL/L (ref 3.5–5.3)
PROT SERPL-MCNC: 7.3 G/DL (ref 6.4–8.2)
RBC # BLD AUTO: 5.25 X10*6/UL (ref 4.5–5.9)
SALICYLATES SERPL-MCNC: <3 MG/DL (ref ?–20)
SODIUM SERPL-SCNC: 139 MMOL/L (ref 136–145)
WBC # BLD AUTO: 11.2 X10*3/UL (ref 4.4–11.3)

## 2025-07-30 PROCEDURE — 85025 COMPLETE CBC W/AUTO DIFF WBC: CPT | Performed by: STUDENT IN AN ORGANIZED HEALTH CARE EDUCATION/TRAINING PROGRAM

## 2025-07-30 PROCEDURE — 2500000002 HC RX 250 W HCPCS SELF ADMINISTERED DRUGS (ALT 637 FOR MEDICARE OP, ALT 636 FOR OP/ED): Performed by: STUDENT IN AN ORGANIZED HEALTH CARE EDUCATION/TRAINING PROGRAM

## 2025-07-30 PROCEDURE — S4991 NICOTINE PATCH NONLEGEND: HCPCS | Performed by: STUDENT IN AN ORGANIZED HEALTH CARE EDUCATION/TRAINING PROGRAM

## 2025-07-30 PROCEDURE — 80053 COMPREHEN METABOLIC PANEL: CPT | Performed by: STUDENT IN AN ORGANIZED HEALTH CARE EDUCATION/TRAINING PROGRAM

## 2025-07-30 PROCEDURE — 93005 ELECTROCARDIOGRAM TRACING: CPT

## 2025-07-30 PROCEDURE — 36415 COLL VENOUS BLD VENIPUNCTURE: CPT | Performed by: STUDENT IN AN ORGANIZED HEALTH CARE EDUCATION/TRAINING PROGRAM

## 2025-07-30 PROCEDURE — 80143 DRUG ASSAY ACETAMINOPHEN: CPT | Performed by: STUDENT IN AN ORGANIZED HEALTH CARE EDUCATION/TRAINING PROGRAM

## 2025-07-30 PROCEDURE — 99284 EMERGENCY DEPT VISIT MOD MDM: CPT | Performed by: STUDENT IN AN ORGANIZED HEALTH CARE EDUCATION/TRAINING PROGRAM

## 2025-07-30 RX ORDER — IBUPROFEN 200 MG
1 TABLET ORAL ONCE
Status: DISCONTINUED | OUTPATIENT
Start: 2025-07-30 | End: 2025-07-31 | Stop reason: HOSPADM

## 2025-07-30 RX ADMIN — NICOTINE 1 PATCH: 21 PATCH, EXTENDED RELEASE TRANSDERMAL at 23:28

## 2025-07-30 SDOH — SOCIAL STABILITY: SOCIAL INSECURITY: HARMFUL ACTIONS TOWARD OTHERS: NONE OBSERVED

## 2025-07-30 SDOH — HEALTH STABILITY: MENTAL HEALTH: SELF INJURIOUS THOUGHTS: DENIES

## 2025-07-30 SDOH — HEALTH STABILITY: MENTAL HEALTH: SELF INJURIOUS BEHAVIORS: NONE OBSERVED

## 2025-07-30 SDOH — SOCIAL STABILITY: SOCIAL INSECURITY: THOUGHTS OF HARMING OTHERS: DENIES

## 2025-07-30 ASSESSMENT — PAIN - FUNCTIONAL ASSESSMENT: PAIN_FUNCTIONAL_ASSESSMENT: 0-10

## 2025-07-30 ASSESSMENT — LIFESTYLE VARIABLES
HAVE PEOPLE ANNOYED YOU BY CRITICIZING YOUR DRINKING: NO
HAVE YOU EVER FELT YOU SHOULD CUT DOWN ON YOUR DRINKING: NO
EVER FELT BAD OR GUILTY ABOUT YOUR DRINKING: NO
EVER HAD A DRINK FIRST THING IN THE MORNING TO STEADY YOUR NERVES TO GET RID OF A HANGOVER: NO
TOTAL SCORE: 0

## 2025-07-30 ASSESSMENT — PAIN SCALES - GENERAL: PAINLEVEL_OUTOF10: 0 - NO PAIN

## 2025-07-31 LAB
ATRIAL RATE: 87 BPM
P AXIS: 24 DEGREES
P OFFSET: 185 MS
P ONSET: 135 MS
PR INTERVAL: 160 MS
Q ONSET: 215 MS
QRS COUNT: 14 BEATS
QRS DURATION: 96 MS
QT INTERVAL: 356 MS
QTC CALCULATION(BAZETT): 428 MS
QTC FREDERICIA: 402 MS
R AXIS: 48 DEGREES
T AXIS: 47 DEGREES
T OFFSET: 393 MS
VENTRICULAR RATE: 87 BPM

## 2025-07-31 SDOH — SOCIAL STABILITY: SOCIAL INSECURITY: HARMFUL ACTIONS TOWARD OTHERS: NONE OBSERVED

## 2025-07-31 SDOH — HEALTH STABILITY: MENTAL HEALTH: SELF INJURIOUS BEHAVIORS: NONE OBSERVED

## 2025-07-31 SDOH — SOCIAL STABILITY: SOCIAL INSECURITY: THOUGHTS OF HARMING OTHERS: DENIES

## 2025-07-31 SDOH — HEALTH STABILITY: MENTAL HEALTH: SELF INJURIOUS THOUGHTS: DENIES

## 2025-07-31 NOTE — ED PROVIDER NOTES
HPI   No chief complaint on file.      Patient is a 29-year-old male past medical history of asthma presents the ED via EMS for psychiatric assessment.  Patient states that he is having a really hard time right now because he has been together with his partner for many years and they have a daughter together.  Recently the partner is trying to take the daughter away.  He denies any suicidal or homicidal ideation.              Patient History   Medical History[1]  Surgical History[2]  Family History[3]  Social History[4]    Physical Exam   ED Triage Vitals   Temp Pulse Resp BP   -- -- -- --      SpO2 Temp src Heart Rate Source Patient Position   -- -- -- --      BP Location FiO2 (%)     -- --       Physical Exam  Vitals and nursing note reviewed.     Cardiovascular:      Rate and Rhythm: Normal rate and regular rhythm.      Heart sounds: No murmur heard.  Pulmonary:      Effort: Pulmonary effort is normal. No respiratory distress.      Breath sounds: Normal breath sounds. No wheezing.     Musculoskeletal:      Cervical back: Neck supple.     Skin:     General: Skin is warm and dry.     Psychiatric:         Mood and Affect: Affect is tearful.         Thought Content: Thought content does not include homicidal or suicidal ideation.           ED Course & MDM   Diagnoses as of 07/31/25 0118   Depression, unspecified depression type                 No data recorded                                 Medical Decision Making  EKG is interpreted by me as normal sinus rhythm with ventricular rate of 87 bpm.  Normal axis.  WI of 116 QTc of 428.  No STEMI.    Patient is nontoxic.  No distress.  He is here for feelings of depression but denies any suicidal ideation or homicidal ideation.  A medical clearance workup was performed and he is medically cleared.  While awaiting EPAT assessment he says that he has to work the morning and wants to leave prior to the EPAT assessment.  He would like outpatient resources for follow-up.  We  discussed return precautions.  Patient was discharged stable condition.        Procedure  Procedures         [1]   Past Medical History:  Diagnosis Date    Asthma     Difficulty breathing 06/26/2024    Comment on above: DIFFICULTY BREATHING    Disease due to severe acute respiratory syndrome coronavirus 2 (SARS-CoV-2) 06/26/2024    Fungal infection 06/26/2024    Headache 06/26/2024    Infection due to fungus 06/26/2024    Lateral epicondylitis 06/26/2024    Personal history of other diseases of the respiratory system 09/03/2021    History of asthma    Vomiting and diarrhea 06/26/2024   [2] No past surgical history on file.  [3] No family history on file.  [4]   Social History  Tobacco Use    Smoking status: Former     Current packs/day: 0.33     Average packs/day: 0.3 packs/day for 18.6 years (6.1 ttl pk-yrs)     Types: Cigarettes     Start date: 2007    Smokeless tobacco: Never   Substance Use Topics    Alcohol use: Yes    Drug use: Yes     Types: Marijuana        Tj Crooks DO  07/31/25 0119

## 2025-09-05 ENCOUNTER — APPOINTMENT (OUTPATIENT)
Dept: RADIOLOGY | Facility: HOSPITAL | Age: 30
End: 2025-09-05
Payer: COMMERCIAL

## 2025-09-05 ENCOUNTER — HOSPITAL ENCOUNTER (EMERGENCY)
Facility: HOSPITAL | Age: 30
Discharge: HOME | End: 2025-09-05
Payer: COMMERCIAL

## 2025-09-05 VITALS
OXYGEN SATURATION: 100 % | WEIGHT: 194 LBS | HEIGHT: 70 IN | SYSTOLIC BLOOD PRESSURE: 154 MMHG | BODY MASS INDEX: 27.77 KG/M2 | TEMPERATURE: 97.9 F | DIASTOLIC BLOOD PRESSURE: 95 MMHG | RESPIRATION RATE: 17 BRPM | HEART RATE: 94 BPM

## 2025-09-05 DIAGNOSIS — S93.401A SPRAIN OF RIGHT ANKLE, INITIAL ENCOUNTER: Primary | ICD-10-CM

## 2025-09-05 PROCEDURE — 99284 EMERGENCY DEPT VISIT MOD MDM: CPT

## 2025-09-05 PROCEDURE — 73630 X-RAY EXAM OF FOOT: CPT | Mod: RT

## 2025-09-05 PROCEDURE — 73610 X-RAY EXAM OF ANKLE: CPT | Mod: RT

## 2025-09-05 PROCEDURE — 2500000001 HC RX 250 WO HCPCS SELF ADMINISTERED DRUGS (ALT 637 FOR MEDICARE OP)

## 2025-09-05 RX ORDER — HYDROCODONE BITARTRATE AND ACETAMINOPHEN 5; 325 MG/1; MG/1
1 TABLET ORAL ONCE
Refills: 0 | Status: COMPLETED | OUTPATIENT
Start: 2025-09-05 | End: 2025-09-05

## 2025-09-05 RX ORDER — ACETAMINOPHEN 500 MG
1000 TABLET ORAL EVERY 8 HOURS PRN
Qty: 30 TABLET | Refills: 0 | Status: SHIPPED | OUTPATIENT
Start: 2025-09-05 | End: 2025-09-12

## 2025-09-05 RX ADMIN — HYDROCODONE BITARTRATE AND ACETAMINOPHEN 1 TABLET: 5; 325 TABLET ORAL at 10:42

## 2025-09-05 ASSESSMENT — PAIN DESCRIPTION - LOCATION: LOCATION: ANKLE

## 2025-09-05 ASSESSMENT — LIFESTYLE VARIABLES
EVER FELT BAD OR GUILTY ABOUT YOUR DRINKING: NO
EVER HAD A DRINK FIRST THING IN THE MORNING TO STEADY YOUR NERVES TO GET RID OF A HANGOVER: NO
TOTAL SCORE: 0
HAVE YOU EVER FELT YOU SHOULD CUT DOWN ON YOUR DRINKING: NO
HAVE PEOPLE ANNOYED YOU BY CRITICIZING YOUR DRINKING: NO

## 2025-09-05 ASSESSMENT — PAIN SCALES - GENERAL
PAINLEVEL_OUTOF10: 6
PAINLEVEL_OUTOF10: 10 - WORST POSSIBLE PAIN

## 2025-09-05 ASSESSMENT — PAIN DESCRIPTION - PAIN TYPE: TYPE: ACUTE PAIN

## 2025-09-05 ASSESSMENT — PAIN - FUNCTIONAL ASSESSMENT
PAIN_FUNCTIONAL_ASSESSMENT: 0-10
PAIN_FUNCTIONAL_ASSESSMENT: 0-10